# Patient Record
Sex: FEMALE | Race: WHITE | NOT HISPANIC OR LATINO | URBAN - METROPOLITAN AREA
[De-identification: names, ages, dates, MRNs, and addresses within clinical notes are randomized per-mention and may not be internally consistent; named-entity substitution may affect disease eponyms.]

---

## 2017-10-18 ENCOUNTER — EMERGENCY (EMERGENCY)
Facility: HOSPITAL | Age: 67
LOS: 1 days | Discharge: HOME | End: 2017-10-18

## 2017-10-18 DIAGNOSIS — Z79.899 OTHER LONG TERM (CURRENT) DRUG THERAPY: ICD-10-CM

## 2017-10-18 DIAGNOSIS — R04.0 EPISTAXIS: ICD-10-CM

## 2017-10-18 DIAGNOSIS — I10 ESSENTIAL (PRIMARY) HYPERTENSION: ICD-10-CM

## 2017-10-18 DIAGNOSIS — E78.00 PURE HYPERCHOLESTEROLEMIA, UNSPECIFIED: ICD-10-CM

## 2017-10-18 DIAGNOSIS — E03.9 HYPOTHYROIDISM, UNSPECIFIED: ICD-10-CM

## 2018-01-18 ENCOUNTER — OUTPATIENT (OUTPATIENT)
Dept: OUTPATIENT SERVICES | Facility: HOSPITAL | Age: 68
LOS: 1 days | Discharge: HOME | End: 2018-01-18

## 2018-01-18 DIAGNOSIS — R92.8 OTHER ABNORMAL AND INCONCLUSIVE FINDINGS ON DIAGNOSTIC IMAGING OF BREAST: ICD-10-CM

## 2018-01-25 ENCOUNTER — OUTPATIENT (OUTPATIENT)
Dept: OUTPATIENT SERVICES | Facility: HOSPITAL | Age: 68
LOS: 1 days | Discharge: HOME | End: 2018-01-25

## 2018-01-31 ENCOUNTER — TRANSCRIPTION ENCOUNTER (OUTPATIENT)
Age: 68
End: 2018-01-31

## 2018-02-08 ENCOUNTER — APPOINTMENT (OUTPATIENT)
Dept: BREAST CENTER | Facility: CLINIC | Age: 68
End: 2018-02-08
Payer: MEDICARE

## 2018-02-08 ENCOUNTER — LABORATORY RESULT (OUTPATIENT)
Age: 68
End: 2018-02-08

## 2018-02-08 VITALS
HEART RATE: 74 BPM | HEIGHT: 67 IN | OXYGEN SATURATION: 97 % | DIASTOLIC BLOOD PRESSURE: 90 MMHG | WEIGHT: 175 LBS | SYSTOLIC BLOOD PRESSURE: 154 MMHG | BODY MASS INDEX: 27.47 KG/M2

## 2018-02-08 DIAGNOSIS — K21.9 GASTRO-ESOPHAGEAL REFLUX DISEASE W/OUT ESOPHAGITIS: ICD-10-CM

## 2018-02-08 DIAGNOSIS — Z80.0 FAMILY HISTORY OF MALIGNANT NEOPLASM OF DIGESTIVE ORGANS: ICD-10-CM

## 2018-02-08 DIAGNOSIS — I10 ESSENTIAL (PRIMARY) HYPERTENSION: ICD-10-CM

## 2018-02-08 DIAGNOSIS — E03.9 HYPOTHYROIDISM, UNSPECIFIED: ICD-10-CM

## 2018-02-08 DIAGNOSIS — Z80.42 FAMILY HISTORY OF MALIGNANT NEOPLASM OF PROSTATE: ICD-10-CM

## 2018-02-08 DIAGNOSIS — I34.1 NONRHEUMATIC MITRAL (VALVE) PROLAPSE: ICD-10-CM

## 2018-02-08 DIAGNOSIS — D24.2 BENIGN NEOPLASM OF LEFT BREAST: ICD-10-CM

## 2018-02-08 DIAGNOSIS — Z80.3 FAMILY HISTORY OF MALIGNANT NEOPLASM OF BREAST: ICD-10-CM

## 2018-02-08 DIAGNOSIS — Z80.8 FAMILY HISTORY OF MALIGNANT NEOPLASM OF OTHER ORGANS OR SYSTEMS: ICD-10-CM

## 2018-02-08 DIAGNOSIS — Z87.891 PERSONAL HISTORY OF NICOTINE DEPENDENCE: ICD-10-CM

## 2018-02-08 DIAGNOSIS — E78.5 HYPERLIPIDEMIA, UNSPECIFIED: ICD-10-CM

## 2018-02-08 PROCEDURE — 99205 OFFICE O/P NEW HI 60 MIN: CPT

## 2018-02-08 RX ORDER — LEVOTHYROXINE SODIUM 0.07 MG/1
75 TABLET ORAL
Refills: 0 | Status: ACTIVE | COMMUNITY

## 2018-02-08 RX ORDER — METOPROLOL SUCCINATE 50 MG/1
50 TABLET, EXTENDED RELEASE ORAL
Refills: 0 | Status: ACTIVE | COMMUNITY

## 2018-02-08 RX ORDER — OMEPRAZOLE 40 MG/1
40 CAPSULE, DELAYED RELEASE ORAL
Refills: 0 | Status: ACTIVE | COMMUNITY

## 2018-02-08 RX ORDER — PRAVASTATIN SODIUM 10 MG/1
10 TABLET ORAL
Refills: 0 | Status: ACTIVE | COMMUNITY

## 2018-02-09 DIAGNOSIS — N60.22 FIBROADENOSIS OF LEFT BREAST: ICD-10-CM

## 2018-02-09 DIAGNOSIS — C50.912 MALIGNANT NEOPLASM OF UNSPECIFIED SITE OF LEFT FEMALE BREAST: ICD-10-CM

## 2018-02-09 DIAGNOSIS — R92.1 MAMMOGRAPHIC CALCIFICATION FOUND ON DIAGNOSTIC IMAGING OF BREAST: ICD-10-CM

## 2018-02-09 DIAGNOSIS — N63.20 UNSPECIFIED LUMP IN THE LEFT BREAST, UNSPECIFIED QUADRANT: ICD-10-CM

## 2018-02-09 DIAGNOSIS — D24.2 BENIGN NEOPLASM OF LEFT BREAST: ICD-10-CM

## 2018-02-13 ENCOUNTER — OUTPATIENT (OUTPATIENT)
Dept: OUTPATIENT SERVICES | Facility: HOSPITAL | Age: 68
LOS: 1 days | Discharge: HOME | End: 2018-02-13

## 2018-02-13 DIAGNOSIS — C50.412 MALIGNANT NEOPLASM OF UPPER-OUTER QUADRANT OF LEFT FEMALE BREAST: ICD-10-CM

## 2018-02-15 ENCOUNTER — OUTPATIENT (OUTPATIENT)
Dept: OUTPATIENT SERVICES | Facility: HOSPITAL | Age: 68
LOS: 1 days | Discharge: HOME | End: 2018-02-15

## 2018-02-15 DIAGNOSIS — C50.412 MALIGNANT NEOPLASM OF UPPER-OUTER QUADRANT OF LEFT FEMALE BREAST: ICD-10-CM

## 2018-02-15 DIAGNOSIS — R92.8 OTHER ABNORMAL AND INCONCLUSIVE FINDINGS ON DIAGNOSTIC IMAGING OF BREAST: ICD-10-CM

## 2018-02-16 ENCOUNTER — APPOINTMENT (OUTPATIENT)
Dept: HEMATOLOGY ONCOLOGY | Facility: CLINIC | Age: 68
End: 2018-02-16

## 2018-02-16 VITALS
RESPIRATION RATE: 14 BRPM | BODY MASS INDEX: 27.47 KG/M2 | DIASTOLIC BLOOD PRESSURE: 92 MMHG | SYSTOLIC BLOOD PRESSURE: 149 MMHG | HEIGHT: 67 IN | WEIGHT: 175 LBS | TEMPERATURE: 98 F | HEART RATE: 74 BPM

## 2018-02-16 DIAGNOSIS — Z86.79 PERSONAL HISTORY OF OTHER DISEASES OF THE CIRCULATORY SYSTEM: ICD-10-CM

## 2018-02-19 PROBLEM — Z86.79 HISTORY OF HYPERTENSION: Status: RESOLVED | Noted: 2018-02-19 | Resolved: 2018-02-19

## 2018-02-21 PROBLEM — R92.8 ABNORMAL MAGNETIC RESONANCE IMAGING OF RIGHT BREAST: Status: ACTIVE | Noted: 2018-02-21

## 2018-02-26 ENCOUNTER — OUTPATIENT (OUTPATIENT)
Dept: OUTPATIENT SERVICES | Facility: HOSPITAL | Age: 68
LOS: 1 days | Discharge: HOME | End: 2018-02-26

## 2018-02-26 ENCOUNTER — RESULT REVIEW (OUTPATIENT)
Age: 68
End: 2018-02-26

## 2018-02-26 DIAGNOSIS — N62 HYPERTROPHY OF BREAST: ICD-10-CM

## 2018-02-26 DIAGNOSIS — N64.89 OTHER SPECIFIED DISORDERS OF BREAST: ICD-10-CM

## 2018-02-26 DIAGNOSIS — N60.31 FIBROSCLEROSIS OF RIGHT BREAST: ICD-10-CM

## 2018-02-26 DIAGNOSIS — N60.21 FIBROADENOSIS OF RIGHT BREAST: ICD-10-CM

## 2018-02-26 DIAGNOSIS — N63.10 UNSPECIFIED LUMP IN THE RIGHT BREAST, UNSPECIFIED QUADRANT: ICD-10-CM

## 2018-02-27 ENCOUNTER — OUTPATIENT (OUTPATIENT)
Dept: OUTPATIENT SERVICES | Facility: HOSPITAL | Age: 68
LOS: 1 days | Discharge: HOME | End: 2018-02-27

## 2018-02-27 VITALS
DIASTOLIC BLOOD PRESSURE: 84 MMHG | TEMPERATURE: 98 F | RESPIRATION RATE: 16 BRPM | OXYGEN SATURATION: 98 % | HEIGHT: 67 IN | SYSTOLIC BLOOD PRESSURE: 147 MMHG | WEIGHT: 168.65 LBS | HEART RATE: 74 BPM

## 2018-02-27 DIAGNOSIS — Z01.818 ENCOUNTER FOR OTHER PREPROCEDURAL EXAMINATION: ICD-10-CM

## 2018-02-27 DIAGNOSIS — I50.1 LEFT VENTRICULAR FAILURE, UNSPECIFIED: ICD-10-CM

## 2018-02-27 DIAGNOSIS — R92.1 MAMMOGRAPHIC CALCIFICATION FOUND ON DIAGNOSTIC IMAGING OF BREAST: Chronic | ICD-10-CM

## 2018-02-27 DIAGNOSIS — C50.412 MALIGNANT NEOPLASM OF UPPER-OUTER QUADRANT OF LEFT FEMALE BREAST: ICD-10-CM

## 2018-02-27 DIAGNOSIS — Z90.89 ACQUIRED ABSENCE OF OTHER ORGANS: Chronic | ICD-10-CM

## 2018-02-27 DIAGNOSIS — Z87.2 PERSONAL HISTORY OF DISEASES OF THE SKIN AND SUBCUTANEOUS TISSUE: Chronic | ICD-10-CM

## 2018-02-27 LAB
ANION GAP SERPL CALC-SCNC: 9 MMOL/L — SIGNIFICANT CHANGE UP (ref 7–14)
APPEARANCE UR: (no result)
APTT BLD: 26.2 SEC — LOW (ref 27–39.2)
BASOPHILS # BLD AUTO: 0.03 K/UL — SIGNIFICANT CHANGE UP (ref 0–0.2)
BASOPHILS NFR BLD AUTO: 0.4 % — SIGNIFICANT CHANGE UP (ref 0–1)
BILIRUB UR-MCNC: (no result)
BUN SERPL-MCNC: 14 MG/DL — SIGNIFICANT CHANGE UP (ref 10–20)
CALCIUM SERPL-MCNC: 10 MG/DL — SIGNIFICANT CHANGE UP (ref 8.5–10.1)
CHLORIDE SERPL-SCNC: 105 MMOL/L — SIGNIFICANT CHANGE UP (ref 98–110)
CO2 SERPL-SCNC: 27 MMOL/L — SIGNIFICANT CHANGE UP (ref 17–32)
COD CRY URNS QL: (no result) /HPF
COLOR SPEC: SIGNIFICANT CHANGE UP
CREAT SERPL-MCNC: 0.6 MG/DL — LOW (ref 0.7–1.5)
DIFF PNL FLD: NEGATIVE — SIGNIFICANT CHANGE UP
EOSINOPHIL # BLD AUTO: 0.1 K/UL — SIGNIFICANT CHANGE UP (ref 0–0.7)
EOSINOPHIL NFR BLD AUTO: 1.3 % — SIGNIFICANT CHANGE UP (ref 0–8)
GLUCOSE SERPL-MCNC: 105 MG/DL — SIGNIFICANT CHANGE UP (ref 70–110)
GLUCOSE UR QL: NEGATIVE — SIGNIFICANT CHANGE UP
HCT VFR BLD CALC: 43.4 % — SIGNIFICANT CHANGE UP (ref 37–47)
HGB BLD-MCNC: 14.3 G/DL — SIGNIFICANT CHANGE UP (ref 14–18)
IMM GRANULOCYTES NFR BLD AUTO: 0.4 % — HIGH (ref 0.1–0.3)
INR BLD: 1.08 RATIO — SIGNIFICANT CHANGE UP (ref 0.65–1.3)
KETONES UR-MCNC: NEGATIVE — SIGNIFICANT CHANGE UP
LEUKOCYTE ESTERASE UR-ACNC: NEGATIVE — SIGNIFICANT CHANGE UP
LYMPHOCYTES # BLD AUTO: 1.52 K/UL — SIGNIFICANT CHANGE UP (ref 1.2–3.4)
LYMPHOCYTES # BLD AUTO: 20.1 % — LOW (ref 20.5–51.1)
MCHC RBC-ENTMCNC: 29.1 PG — SIGNIFICANT CHANGE UP (ref 27–31)
MCHC RBC-ENTMCNC: 32.9 G/DL — SIGNIFICANT CHANGE UP (ref 32–37)
MCV RBC AUTO: 88.4 FL — SIGNIFICANT CHANGE UP (ref 81–91)
MONOCYTES # BLD AUTO: 0.62 K/UL — HIGH (ref 0.1–0.6)
MONOCYTES NFR BLD AUTO: 8.2 % — SIGNIFICANT CHANGE UP (ref 1.7–9.3)
NEUTROPHILS # BLD AUTO: 5.28 K/UL — SIGNIFICANT CHANGE UP (ref 1.4–6.5)
NEUTROPHILS NFR BLD AUTO: 69.6 % — SIGNIFICANT CHANGE UP (ref 42.2–75.2)
NITRITE UR-MCNC: NEGATIVE — SIGNIFICANT CHANGE UP
NRBC # BLD: 0 /100 WBCS — SIGNIFICANT CHANGE UP (ref 0–0)
PH UR: 6 — SIGNIFICANT CHANGE UP (ref 5–8)
PLATELET # BLD AUTO: 241 K/UL — SIGNIFICANT CHANGE UP (ref 130–400)
POTASSIUM SERPL-MCNC: 3.8 MMOL/L — SIGNIFICANT CHANGE UP (ref 3.5–5)
POTASSIUM SERPL-SCNC: 3.8 MMOL/L — SIGNIFICANT CHANGE UP (ref 3.5–5)
PROT UR-MCNC: 100
PROTHROM AB SERPL-ACNC: 11.7 SEC — SIGNIFICANT CHANGE UP (ref 9.95–12.87)
RBC # BLD: 4.91 M/UL — SIGNIFICANT CHANGE UP (ref 4.2–5.4)
RBC # FLD: 13 % — SIGNIFICANT CHANGE UP (ref 11.5–14.5)
SODIUM SERPL-SCNC: 141 MMOL/L — SIGNIFICANT CHANGE UP (ref 135–146)
SP GR SPEC: >=1.03 — SIGNIFICANT CHANGE UP (ref 1.01–1.03)
SURGICAL PATHOLOGY STUDY: SIGNIFICANT CHANGE UP
URATE CRY FLD QL MICRO: (no result) /HPF
UROBILINOGEN FLD QL: 1 (ref 0.2–0.2)
WBC # BLD: 7.58 K/UL — SIGNIFICANT CHANGE UP (ref 4.8–10.8)
WBC # FLD AUTO: 7.58 K/UL — SIGNIFICANT CHANGE UP (ref 4.8–10.8)
WBC UR QL: SIGNIFICANT CHANGE UP /HPF

## 2018-02-27 NOTE — H&P PST ADULT - OTHER CARE PROVIDERS
cardio: colin bland 2 wks ago, (pt had echo this am-ordered by onc.& having strress echo this afternoon w/ dr shaw) endo: dann bland 1 week ago, onc: lashell bland 2 wks ago

## 2018-02-27 NOTE — H&P PST ADULT - REASON FOR ADMISSION
66 yo female presents with diagnosis of l breast ca via biopsy, now for port placement for upcoming chemo  denies cp,palp,sob,dyspnea,dysuria; ex andre: 2 fos/blocks w/o sob

## 2018-02-27 NOTE — H&P PST ADULT - PMH
Anxiety    Barretts esophagus    Breast cancer  presently left  GERD (gastroesophageal reflux disease)    Hiatal hernia    HTN (hypertension)    Hypothyroid  med dose stable ~ 1 yr  MVP (mitral valve prolapse)    Thyroid nodule

## 2018-02-28 ENCOUNTER — RX RENEWAL (OUTPATIENT)
Age: 68
End: 2018-02-28

## 2018-03-05 ENCOUNTER — APPOINTMENT (OUTPATIENT)
Dept: BREAST CENTER | Facility: AMBULATORY SURGERY CENTER | Age: 68
End: 2018-03-05
Payer: MEDICARE

## 2018-03-05 ENCOUNTER — OUTPATIENT (OUTPATIENT)
Dept: OUTPATIENT SERVICES | Facility: HOSPITAL | Age: 68
LOS: 1 days | Discharge: HOME | End: 2018-03-05

## 2018-03-05 VITALS
DIASTOLIC BLOOD PRESSURE: 93 MMHG | TEMPERATURE: 98 F | OXYGEN SATURATION: 100 % | WEIGHT: 168.65 LBS | SYSTOLIC BLOOD PRESSURE: 170 MMHG | HEIGHT: 67 IN

## 2018-03-05 VITALS
OXYGEN SATURATION: 96 % | SYSTOLIC BLOOD PRESSURE: 147 MMHG | HEART RATE: 74 BPM | RESPIRATION RATE: 21 BRPM | DIASTOLIC BLOOD PRESSURE: 72 MMHG

## 2018-03-05 DIAGNOSIS — Z90.89 ACQUIRED ABSENCE OF OTHER ORGANS: Chronic | ICD-10-CM

## 2018-03-05 DIAGNOSIS — Z87.2 PERSONAL HISTORY OF DISEASES OF THE SKIN AND SUBCUTANEOUS TISSUE: Chronic | ICD-10-CM

## 2018-03-05 DIAGNOSIS — C50.912 MALIGNANT NEOPLASM OF UNSPECIFIED SITE OF LEFT FEMALE BREAST: ICD-10-CM

## 2018-03-05 DIAGNOSIS — R92.1 MAMMOGRAPHIC CALCIFICATION FOUND ON DIAGNOSTIC IMAGING OF BREAST: Chronic | ICD-10-CM

## 2018-03-05 PROCEDURE — 36561 INSERT TUNNELED CV CATH: CPT

## 2018-03-05 RX ORDER — OXYCODONE AND ACETAMINOPHEN 5; 325 MG/1; MG/1
1 TABLET ORAL EVERY 4 HOURS
Qty: 0 | Refills: 0 | Status: DISCONTINUED | OUTPATIENT
Start: 2018-03-05 | End: 2018-03-05

## 2018-03-05 RX ORDER — ONDANSETRON 8 MG/1
4 TABLET, FILM COATED ORAL ONCE
Qty: 0 | Refills: 0 | Status: DISCONTINUED | OUTPATIENT
Start: 2018-03-05 | End: 2018-03-20

## 2018-03-05 RX ORDER — ONDANSETRON 8 MG/1
4 TABLET, FILM COATED ORAL ONCE
Qty: 0 | Refills: 0 | Status: ON HOLD | OUTPATIENT
Start: 2018-03-05

## 2018-03-05 RX ORDER — OXYCODONE AND ACETAMINOPHEN 5; 325 MG/1; MG/1
1 TABLET ORAL EVERY 4 HOURS
Qty: 0 | Refills: 0 | Status: ON HOLD | OUTPATIENT
Start: 2018-03-05

## 2018-03-05 RX ORDER — MORPHINE SULFATE 50 MG/1
2 CAPSULE, EXTENDED RELEASE ORAL
Qty: 0 | Refills: 0 | Status: ON HOLD | OUTPATIENT
Start: 2018-03-05

## 2018-03-05 RX ORDER — KETOCONAZOLE 20 MG/G
1 AEROSOL, FOAM TOPICAL
Qty: 60 | Refills: 0 | OUTPATIENT
Start: 2018-03-05

## 2018-03-05 RX ORDER — SODIUM CHLORIDE 9 MG/ML
1000 INJECTION, SOLUTION INTRAVENOUS
Qty: 0 | Refills: 0 | Status: DISCONTINUED | OUTPATIENT
Start: 2018-03-05 | End: 2018-03-20

## 2018-03-05 RX ORDER — HYDROMORPHONE HYDROCHLORIDE 2 MG/ML
0.5 INJECTION INTRAMUSCULAR; INTRAVENOUS; SUBCUTANEOUS
Qty: 0 | Refills: 0 | Status: DISCONTINUED | OUTPATIENT
Start: 2018-03-05 | End: 2018-03-05

## 2018-03-05 RX ORDER — ACETAMINOPHEN 500 MG
650 TABLET ORAL ONCE
Qty: 0 | Refills: 0 | Status: DISCONTINUED | OUTPATIENT
Start: 2018-03-05 | End: 2018-03-20

## 2018-03-05 RX ORDER — SODIUM CHLORIDE 9 MG/ML
1000 INJECTION, SOLUTION INTRAVENOUS
Qty: 0 | Refills: 0 | Status: ON HOLD | OUTPATIENT
Start: 2018-03-05

## 2018-03-05 RX ADMIN — SODIUM CHLORIDE 100 MILLILITER(S): 9 INJECTION, SOLUTION INTRAVENOUS at 10:57

## 2018-03-05 NOTE — PROGRESS NOTE ADULT - SUBJECTIVE AND OBJECTIVE BOX
Pt s/p left mediport placement. Post op CXR showed no evidence of pneumothorax which was confirmed by Dr. Ramirez. Pt may be d/c from PACU when ready.

## 2018-03-05 NOTE — CHART NOTE - NSCHARTNOTEFT_GEN_A_CORE
PACU ANESTHESIA ADMISSION NOTE      Procedure: Insertion of port with imaging guidance    Post op diagnosis:  Carcinoma of upper-outer quadrant of left female breast, unspecified estrogen receptor status      ____  Intubated  TV:______       Rate: ______      FiO2: ______    _x___  Patent Airway    _x___  Full return of protective reflexes    _x___  Full recovery from anesthesia / back to baseline status    Vitals:  T(C): 36.7   HR: 80  BP: 155/67  RR: --18  SpO2: 98    Mental Status:  _x___ Awake   _____ Alert   _____ Drowsy   _____ Sedated    Nausea/Vomiting:  _x___  NO       ______Yes,   See Post - Op Orders         Pain Scale (0-10):  __0___    Treatment: _x___ None    ____ See Post - Op/PCA Orders    Post - Operative Fluids:   __x__ Oral   ____ See Post - Op Orders    Plan: Discharge:   _x___Home       _____Floor     _____Critical Care    _____  Other:_________________    Comments:  No anesthesia issues or complications noted.  Discharge when criteria met.

## 2018-03-05 NOTE — BRIEF OPERATIVE NOTE - PROCEDURE
<<-----Click on this checkbox to enter Procedure Insertion of port with imaging guidance in patient 5 years of age or older  03/05/2018    Active  [id number]

## 2018-03-05 NOTE — ASU DISCHARGE PLAN (ADULT/PEDIATRIC). - SPECIAL INSTRUCTIONS
Regular Diet.    No heavy lifting more than 15 pounds for two weeks.    Please remove plastic dressings in three days.  Leave white tape in place.  May shower tomorrow.

## 2018-03-05 NOTE — BRIEF OPERATIVE NOTE - PRE-OP DX
Carcinoma of upper-outer quadrant of left female breast, unspecified estrogen receptor status  03/05/2018    Active  Radha Dong

## 2018-03-13 ENCOUNTER — APPOINTMENT (OUTPATIENT)
Dept: BREAST CENTER | Facility: CLINIC | Age: 68
End: 2018-03-13
Payer: MEDICARE

## 2018-03-13 VITALS
WEIGHT: 175 LBS | BODY MASS INDEX: 27.47 KG/M2 | HEIGHT: 67 IN | DIASTOLIC BLOOD PRESSURE: 84 MMHG | HEART RATE: 83 BPM | SYSTOLIC BLOOD PRESSURE: 140 MMHG | OXYGEN SATURATION: 96 %

## 2018-03-13 PROCEDURE — 99024 POSTOP FOLLOW-UP VISIT: CPT

## 2018-03-13 RX ORDER — AMLODIPINE BESYLATE 5 MG/1
5 TABLET ORAL
Qty: 90 | Refills: 0 | Status: ACTIVE | COMMUNITY
Start: 2018-02-09

## 2018-03-14 ENCOUNTER — LABORATORY RESULT (OUTPATIENT)
Age: 68
End: 2018-03-14

## 2018-03-14 ENCOUNTER — APPOINTMENT (OUTPATIENT)
Dept: INFUSION THERAPY | Facility: CLINIC | Age: 68
End: 2018-03-14

## 2018-03-14 LAB
HCT VFR BLD CALC: 42.5 %
HGB BLD-MCNC: 14.8 G/DL
MCHC RBC-ENTMCNC: 29.7 PG
MCHC RBC-ENTMCNC: 34.8 G/DL
MCV RBC AUTO: 85.3 FL
PLATELET # BLD AUTO: 239 K/UL
PMV BLD: 10.4 FL
RBC # BLD: 4.98 M/UL
RBC # FLD: 12.8 %
WBC # FLD AUTO: 8.9 K/UL

## 2018-03-14 RX ORDER — TRASTUZUMAB-DKST 420 MG/20ML
635 INJECTION, POWDER, LYOPHILIZED, FOR SOLUTION INTRAVENOUS ONCE
Qty: 0 | Refills: 0 | Status: COMPLETED | OUTPATIENT
Start: 2018-03-14 | End: 2018-03-14

## 2018-03-14 RX ORDER — ACETAMINOPHEN 500 MG
650 TABLET ORAL ONCE
Qty: 0 | Refills: 0 | Status: COMPLETED | OUTPATIENT
Start: 2018-03-14 | End: 2018-03-14

## 2018-03-14 RX ORDER — DOCETAXEL 20 MG/ML
140 INJECTION, SOLUTION, CONCENTRATE INTRAVENOUS ONCE
Qty: 0 | Refills: 0 | Status: COMPLETED | OUTPATIENT
Start: 2018-03-14 | End: 2018-03-14

## 2018-03-14 RX ORDER — FAMOTIDINE 10 MG/ML
20 INJECTION INTRAVENOUS ONCE
Qty: 0 | Refills: 0 | Status: COMPLETED | OUTPATIENT
Start: 2018-03-14 | End: 2018-03-14

## 2018-03-14 RX ORDER — CARBOPLATIN 50 MG
680 VIAL (EA) INTRAVENOUS ONCE
Qty: 0 | Refills: 0 | Status: COMPLETED | OUTPATIENT
Start: 2018-03-14 | End: 2018-03-14

## 2018-03-14 RX ORDER — DIPHENHYDRAMINE HCL 50 MG
25 CAPSULE ORAL ONCE
Qty: 0 | Refills: 0 | Status: COMPLETED | OUTPATIENT
Start: 2018-03-14 | End: 2018-03-14

## 2018-03-14 RX ORDER — ALTEPLASE 100 MG
2 KIT INTRAVENOUS ONCE
Qty: 0 | Refills: 0 | Status: COMPLETED | OUTPATIENT
Start: 2018-03-14 | End: 2018-03-14

## 2018-03-14 RX ORDER — PERTUZUMAB 30 MG/ML
840 INJECTION, SOLUTION, CONCENTRATE INTRAVENOUS ONCE
Qty: 0 | Refills: 0 | Status: COMPLETED | OUTPATIENT
Start: 2018-03-14 | End: 2018-03-14

## 2018-03-14 RX ORDER — DEXAMETHASONE 0.5 MG/5ML
20 ELIXIR ORAL ONCE
Qty: 0 | Refills: 0 | Status: COMPLETED | OUTPATIENT
Start: 2018-03-14 | End: 2018-03-14

## 2018-03-14 RX ADMIN — Medication 650 MILLIGRAM(S): at 11:16

## 2018-03-14 RX ADMIN — Medication 151.5 MILLIGRAM(S): at 11:17

## 2018-03-14 RX ADMIN — FAMOTIDINE 156 MILLIGRAM(S): 10 INJECTION INTRAVENOUS at 11:17

## 2018-03-14 RX ADMIN — TRASTUZUMAB-DKST 186.82 MILLIGRAM(S): 420 INJECTION, POWDER, LYOPHILIZED, FOR SOLUTION INTRAVENOUS at 11:26

## 2018-03-14 RX ADMIN — Medication 650 MILLIGRAM(S): at 16:07

## 2018-03-14 RX ADMIN — Medication 189 MILLIGRAM(S): at 11:16

## 2018-03-14 RX ADMIN — PERTUZUMAB 278 MILLIGRAM(S): 30 INJECTION, SOLUTION, CONCENTRATE INTRAVENOUS at 11:25

## 2018-03-14 RX ADMIN — Medication 318 MILLIGRAM(S): at 15:28

## 2018-03-14 RX ADMIN — Medication 650 MILLIGRAM(S): at 11:24

## 2018-03-14 RX ADMIN — ALTEPLASE 2 MILLIGRAM(S): KIT at 11:25

## 2018-03-14 RX ADMIN — DOCETAXEL 128.5 MILLIGRAM(S): 20 INJECTION, SOLUTION, CONCENTRATE INTRAVENOUS at 15:26

## 2018-03-14 RX ADMIN — Medication 650 MILLIGRAM(S): at 16:06

## 2018-03-15 ENCOUNTER — APPOINTMENT (OUTPATIENT)
Dept: INFUSION THERAPY | Facility: CLINIC | Age: 68
End: 2018-03-15

## 2018-03-15 RX ORDER — PEGFILGRASTIM-CBQV 6 MG/.6ML
6 INJECTION, SOLUTION SUBCUTANEOUS ONCE
Qty: 0 | Refills: 0 | Status: COMPLETED | OUTPATIENT
Start: 2018-03-15 | End: 2018-03-15

## 2018-03-15 RX ADMIN — PEGFILGRASTIM-CBQV 6 MILLIGRAM(S): 6 INJECTION, SOLUTION SUBCUTANEOUS at 16:44

## 2018-03-21 LAB
ALBUMIN SERPL ELPH-MCNC: 4 G/DL
ALP BLD-CCNC: 56 U/L
ALT SERPL-CCNC: 38 U/L
ANION GAP SERPL CALC-SCNC: 8 MMOL/L
AST SERPL-CCNC: 32 U/L
BILIRUB SERPL-MCNC: 0.7 MG/DL
BUN SERPL-MCNC: 13 MG/DL
CALCIUM SERPL-MCNC: 10.1 MG/DL
CANCER AG15-3 SERPL-ACNC: 19.4 U/ML
CEA SERPL-MCNC: 1.2 NG/ML
CHLORIDE SERPL-SCNC: 102 MMOL/L
CO2 SERPL-SCNC: 25 MMOL/L
CREAT SERPL-MCNC: 0.6 MG/DL
GLUCOSE SERPL-MCNC: 118 MG/DL
POTASSIUM SERPL-SCNC: 3.7 MMOL/L
PROT SERPL-MCNC: 6.8 G/DL
SODIUM SERPL-SCNC: 135 MMOL/L

## 2018-03-23 ENCOUNTER — OUTPATIENT (OUTPATIENT)
Dept: OUTPATIENT SERVICES | Facility: HOSPITAL | Age: 68
LOS: 1 days | Discharge: HOME | End: 2018-03-23
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
## 2018-03-23 DIAGNOSIS — T82.898A OTHER SPECIFIED COMPLICATION OF VASCULAR PROSTHETIC DEVICES, IMPLANTS AND GRAFTS, INITIAL ENCOUNTER: ICD-10-CM

## 2018-03-23 DIAGNOSIS — Z87.2 PERSONAL HISTORY OF DISEASES OF THE SKIN AND SUBCUTANEOUS TISSUE: Chronic | ICD-10-CM

## 2018-03-23 DIAGNOSIS — R92.1 MAMMOGRAPHIC CALCIFICATION FOUND ON DIAGNOSTIC IMAGING OF BREAST: Chronic | ICD-10-CM

## 2018-03-23 DIAGNOSIS — Z85.3 PERSONAL HISTORY OF MALIGNANT NEOPLASM OF BREAST: ICD-10-CM

## 2018-03-23 DIAGNOSIS — Z90.89 ACQUIRED ABSENCE OF OTHER ORGANS: Chronic | ICD-10-CM

## 2018-03-27 ENCOUNTER — LABORATORY RESULT (OUTPATIENT)
Age: 68
End: 2018-03-27

## 2018-03-27 ENCOUNTER — APPOINTMENT (OUTPATIENT)
Dept: HEMATOLOGY ONCOLOGY | Facility: CLINIC | Age: 68
End: 2018-03-27

## 2018-03-28 LAB
ALBUMIN SERPL ELPH-MCNC: 4.2 G/DL
ALP BLD-CCNC: 102 U/L
ALT SERPL-CCNC: 46 U/L
ANION GAP SERPL CALC-SCNC: 21 MMOL/L
AST SERPL-CCNC: 36 U/L
BILIRUB SERPL-MCNC: 0.3 MG/DL
BUN SERPL-MCNC: 18 MG/DL
CALCIUM SERPL-MCNC: 9.5 MG/DL
CHLORIDE SERPL-SCNC: 95 MMOL/L
CO2 SERPL-SCNC: 22 MMOL/L
CREAT SERPL-MCNC: 0.8 MG/DL
GLUCOSE SERPL-MCNC: 60 MG/DL
HCT VFR BLD CALC: 41.3 %
HGB BLD-MCNC: 13.8 G/DL
INR PPP: 1.22 RATIO
MCHC RBC-ENTMCNC: 29.5 PG
MCHC RBC-ENTMCNC: 33.4 G/DL
MCV RBC AUTO: 88.2 FL
PLATELET # BLD AUTO: 145 K/UL
PMV BLD: 9.9 FL
POTASSIUM SERPL-SCNC: 3.6 MMOL/L
PROT SERPL-MCNC: 6.9 G/DL
PT BLD: 13.1 SEC
RBC # BLD: 4.68 M/UL
RBC # FLD: 13.1 %
SODIUM SERPL-SCNC: 138 MMOL/L
WBC # FLD AUTO: 16.51 K/UL

## 2018-03-29 ENCOUNTER — OUTPATIENT (OUTPATIENT)
Dept: OUTPATIENT SERVICES | Facility: HOSPITAL | Age: 68
LOS: 1 days | Discharge: HOME | End: 2018-03-29

## 2018-03-29 DIAGNOSIS — E03.9 HYPOTHYROIDISM, UNSPECIFIED: ICD-10-CM

## 2018-03-29 DIAGNOSIS — C50.919 MALIGNANT NEOPLASM OF UNSPECIFIED SITE OF UNSPECIFIED FEMALE BREAST: ICD-10-CM

## 2018-03-29 DIAGNOSIS — R92.1 MAMMOGRAPHIC CALCIFICATION FOUND ON DIAGNOSTIC IMAGING OF BREAST: Chronic | ICD-10-CM

## 2018-03-29 DIAGNOSIS — E78.5 HYPERLIPIDEMIA, UNSPECIFIED: ICD-10-CM

## 2018-03-29 DIAGNOSIS — I10 ESSENTIAL (PRIMARY) HYPERTENSION: ICD-10-CM

## 2018-03-29 DIAGNOSIS — Z90.89 ACQUIRED ABSENCE OF OTHER ORGANS: Chronic | ICD-10-CM

## 2018-03-29 DIAGNOSIS — Z87.2 PERSONAL HISTORY OF DISEASES OF THE SKIN AND SUBCUTANEOUS TISSUE: Chronic | ICD-10-CM

## 2018-03-29 RX ORDER — CEPHALEXIN 500 MG
1 CAPSULE ORAL
Qty: 10 | Refills: 0 | OUTPATIENT
Start: 2018-03-29 | End: 2018-04-02

## 2018-03-29 NOTE — PROGRESS NOTE ADULT - SUBJECTIVE AND OBJECTIVE BOX
Interventional Radiology Outpatient Documentation    PREOPERATIVE DAY OF PROCEDURE EVALUATION:     I have personally seen and examined this patient. I agree with the history and physical which I have reviewed and noted any changes below:     Plan is for Port-a-Cath removal with replacement with new Port-A-Cath     Procedure/ risks/ benefits/ goals/ alternatives were explained. All questions answered. Informed content obtained from patient. Consent placed in chart.     POSTOPERATIVE PROCEDURAL EVALUATION:     Procedure:    Pre-Op Diagnosis:    Post-Op Diagnosis:    Attending:   Assistant:     Anesthesia (type):  [ ] General Anesthesia  [ ] Sedation  [ ] Spinal Anesthesia  [ ] Local/Regional    Contrast:     Estimated Blood Loss:    Condition:   [ ] Critical  [ ] Serious  [ ] Fair   [ ] Good    Findings/Follow up Plan of Care:    Specimens Removed:    Implants:    Complications:    Disposition: Interventional Radiology Outpatient Documentation    PREOPERATIVE DAY OF PROCEDURE EVALUATION:     I have personally seen and examined this patient. I agree with the history and physical which I have reviewed and noted any changes below:     Plan is for Port-a-Cath removal with replacement with new Port-A-Cath     Procedure/ risks/ benefits/ goals/ alternatives were explained. All questions answered. Informed content obtained from patient. Consent placed in chart.     POSTOPERATIVE PROCEDURAL EVALUATION:     Procedure: Port removal and replacement with new port via right IJ    Pre-Op Diagnosis: malfunctioning port    Post-Op Diagnosis: as above    Attending: andi  Assistant: damion    Anesthesia (type):  [ ] General Anesthesia  [ x] Sedation  [ ] Spinal Anesthesia  [ ] Local/Regional    Contrast: none    Estimated Blood Loss: < 5 cc    Condition:   [ ] Critical  [ ] Serious  [ ] Fair   [x ] Good    Findings/Follow up Plan of Care:  1) port ok to use  2) will f/u in 5 days  3) home abx for 5 days- keflex     Specimens Removed: none    Implants: none    Complications: none    Disposition: home

## 2018-04-02 ENCOUNTER — APPOINTMENT (OUTPATIENT)
Dept: HEMATOLOGY ONCOLOGY | Facility: CLINIC | Age: 68
End: 2018-04-02

## 2018-04-02 ENCOUNTER — LABORATORY RESULT (OUTPATIENT)
Age: 68
End: 2018-04-02

## 2018-04-02 VITALS
RESPIRATION RATE: 16 BRPM | BODY MASS INDEX: 26.06 KG/M2 | TEMPERATURE: 97.5 F | WEIGHT: 166 LBS | SYSTOLIC BLOOD PRESSURE: 151 MMHG | DIASTOLIC BLOOD PRESSURE: 85 MMHG | HEART RATE: 72 BPM | HEIGHT: 67 IN

## 2018-04-02 LAB
HCT VFR BLD CALC: 40.6 %
HGB BLD-MCNC: 13.7 G/DL
MCHC RBC-ENTMCNC: 29.4 PG
MCHC RBC-ENTMCNC: 33.7 G/DL
MCV RBC AUTO: 87.1 FL
PLATELET # BLD AUTO: 183 K/UL
PMV BLD: 10.3 FL
RBC # BLD: 4.66 M/UL
RBC # FLD: 13.5 %
WBC # FLD AUTO: 10.92 K/UL

## 2018-04-04 ENCOUNTER — APPOINTMENT (OUTPATIENT)
Dept: INFUSION THERAPY | Facility: CLINIC | Age: 68
End: 2018-04-04

## 2018-04-04 RX ORDER — DEXAMETHASONE 0.5 MG/5ML
20 ELIXIR ORAL ONCE
Qty: 0 | Refills: 0 | Status: COMPLETED | OUTPATIENT
Start: 2018-04-04 | End: 2018-04-04

## 2018-04-04 RX ORDER — TRASTUZUMAB-DKST 420 MG/20ML
450 INJECTION, POWDER, LYOPHILIZED, FOR SOLUTION INTRAVENOUS ONCE
Qty: 0 | Refills: 0 | Status: COMPLETED | OUTPATIENT
Start: 2018-04-04 | End: 2018-04-04

## 2018-04-04 RX ORDER — ACETAMINOPHEN 500 MG
650 TABLET ORAL ONCE
Qty: 0 | Refills: 0 | Status: COMPLETED | OUTPATIENT
Start: 2018-04-04 | End: 2018-04-04

## 2018-04-04 RX ORDER — PEGFILGRASTIM-CBQV 6 MG/.6ML
6 INJECTION, SOLUTION SUBCUTANEOUS ONCE
Qty: 0 | Refills: 0 | Status: COMPLETED | OUTPATIENT
Start: 2018-04-04 | End: 2018-04-04

## 2018-04-04 RX ORDER — FAMOTIDINE 10 MG/ML
20 INJECTION INTRAVENOUS ONCE
Qty: 0 | Refills: 0 | Status: COMPLETED | OUTPATIENT
Start: 2018-04-04 | End: 2018-04-04

## 2018-04-04 RX ORDER — DIPHENHYDRAMINE HCL 50 MG
25 CAPSULE ORAL ONCE
Qty: 0 | Refills: 0 | Status: COMPLETED | OUTPATIENT
Start: 2018-04-04 | End: 2018-04-04

## 2018-04-04 RX ORDER — DOCETAXEL 20 MG/ML
140 INJECTION, SOLUTION, CONCENTRATE INTRAVENOUS ONCE
Qty: 0 | Refills: 0 | Status: COMPLETED | OUTPATIENT
Start: 2018-04-04 | End: 2018-04-04

## 2018-04-04 RX ORDER — CARBOPLATIN 50 MG
525 VIAL (EA) INTRAVENOUS ONCE
Qty: 0 | Refills: 0 | Status: COMPLETED | OUTPATIENT
Start: 2018-04-04 | End: 2018-04-04

## 2018-04-04 RX ORDER — PERTUZUMAB 30 MG/ML
420 INJECTION, SOLUTION, CONCENTRATE INTRAVENOUS ONCE
Qty: 0 | Refills: 0 | Status: COMPLETED | OUTPATIENT
Start: 2018-04-04 | End: 2018-04-04

## 2018-04-04 RX ORDER — MAGNESIUM SULFATE 500 MG/ML
2 VIAL (ML) INJECTION ONCE
Qty: 0 | Refills: 0 | Status: COMPLETED | OUTPATIENT
Start: 2018-04-04 | End: 2018-04-04

## 2018-04-04 RX ADMIN — Medication 650 MILLIGRAM(S): at 09:52

## 2018-04-04 RX ADMIN — Medication 151.5 MILLIGRAM(S): at 09:51

## 2018-04-04 RX ADMIN — Medication 50 GRAM(S): at 13:34

## 2018-04-04 RX ADMIN — Medication 650 MILLIGRAM(S): at 10:37

## 2018-04-04 RX ADMIN — PEGFILGRASTIM-CBQV 6 MILLIGRAM(S): 6 INJECTION, SOLUTION SUBCUTANEOUS at 15:36

## 2018-04-04 RX ADMIN — DOCETAXEL 257 MILLIGRAM(S): 20 INJECTION, SOLUTION, CONCENTRATE INTRAVENOUS at 10:35

## 2018-04-04 RX ADMIN — Medication 189 MILLIGRAM(S): at 10:36

## 2018-04-04 RX ADMIN — PERTUZUMAB 528 MILLIGRAM(S): 30 INJECTION, SOLUTION, CONCENTRATE INTRAVENOUS at 10:36

## 2018-04-04 RX ADMIN — Medication 302.5 MILLIGRAM(S): at 10:35

## 2018-04-04 RX ADMIN — TRASTUZUMAB-DKST 542.84 MILLIGRAM(S): 420 INJECTION, POWDER, LYOPHILIZED, FOR SOLUTION INTRAVENOUS at 10:36

## 2018-04-04 RX ADMIN — FAMOTIDINE 156 MILLIGRAM(S): 10 INJECTION INTRAVENOUS at 10:37

## 2018-04-05 ENCOUNTER — APPOINTMENT (OUTPATIENT)
Dept: INFUSION THERAPY | Facility: CLINIC | Age: 68
End: 2018-04-05

## 2018-04-09 LAB
ALBUMIN SERPL ELPH-MCNC: 3.9 G/DL
ALP BLD-CCNC: 64 U/L
ALT SERPL-CCNC: 42 U/L
ANION GAP SERPL CALC-SCNC: 13 MMOL/L
AST SERPL-CCNC: 30 U/L
BILIRUB DIRECT SERPL-MCNC: <0.2 MG/DL
BILIRUB INDIRECT SERPL-MCNC: >0.1 MG/DL
BILIRUB SERPL-MCNC: 0.3 MG/DL
BUN SERPL-MCNC: 13 MG/DL
CALCIUM SERPL-MCNC: 9.2 MG/DL
CHLORIDE SERPL-SCNC: 99 MMOL/L
CO2 SERPL-SCNC: 23 MMOL/L
CREAT SERPL-MCNC: 0.7 MG/DL
GLUCOSE SERPL-MCNC: 112 MG/DL
MAGNESIUM SERPL-MCNC: 1.4 MG/DL
POTASSIUM SERPL-SCNC: 3.6 MMOL/L
PROT SERPL-MCNC: 6.1 G/DL
SODIUM SERPL-SCNC: 135 MMOL/L

## 2018-04-18 NOTE — ASU PATIENT PROFILE, ADULT - AS SC BRADEN MOISTURE
Follow up with eye doctor for evaluation. - shingles is Varicella Zoster and that the first time a person is exposed to the virus they get chicken pox.   The virus then stays dormant in your body until a time when your immune system is lowered either by
(4) rarely moist

## 2018-04-23 ENCOUNTER — APPOINTMENT (OUTPATIENT)
Dept: HEMATOLOGY ONCOLOGY | Facility: CLINIC | Age: 68
End: 2018-04-23

## 2018-04-23 ENCOUNTER — LABORATORY RESULT (OUTPATIENT)
Age: 68
End: 2018-04-23

## 2018-04-23 VITALS
RESPIRATION RATE: 16 BRPM | HEART RATE: 87 BPM | HEIGHT: 67 IN | DIASTOLIC BLOOD PRESSURE: 70 MMHG | SYSTOLIC BLOOD PRESSURE: 138 MMHG | TEMPERATURE: 96.8 F | BODY MASS INDEX: 25.43 KG/M2 | WEIGHT: 162 LBS

## 2018-04-23 LAB
HCT VFR BLD CALC: 39.4 %
HGB BLD-MCNC: 13 G/DL
MCHC RBC-ENTMCNC: 29.7 PG
MCHC RBC-ENTMCNC: 33 G/DL
MCV RBC AUTO: 90 FL
PLATELET # BLD AUTO: 191 K/UL
PMV BLD: 9.9 FL
RBC # BLD: 4.38 M/UL
RBC # FLD: 14.9 %
WBC # FLD AUTO: 8.44 K/UL

## 2018-04-25 ENCOUNTER — APPOINTMENT (OUTPATIENT)
Dept: INFUSION THERAPY | Facility: CLINIC | Age: 68
End: 2018-04-25

## 2018-04-25 RX ORDER — PEGFILGRASTIM-CBQV 6 MG/.6ML
6 INJECTION, SOLUTION SUBCUTANEOUS ONCE
Qty: 0 | Refills: 0 | Status: COMPLETED | OUTPATIENT
Start: 2018-04-25 | End: 2018-04-25

## 2018-04-25 RX ORDER — TRASTUZUMAB-DKST 420 MG/20ML
440 INJECTION, POWDER, LYOPHILIZED, FOR SOLUTION INTRAVENOUS ONCE
Qty: 0 | Refills: 0 | Status: COMPLETED | OUTPATIENT
Start: 2018-04-25 | End: 2018-04-25

## 2018-04-25 RX ORDER — MAGNESIUM SULFATE 500 MG/ML
2 VIAL (ML) INJECTION ONCE
Qty: 0 | Refills: 0 | Status: COMPLETED | OUTPATIENT
Start: 2018-04-25 | End: 2018-04-25

## 2018-04-25 RX ORDER — ACETAMINOPHEN 500 MG
650 TABLET ORAL ONCE
Qty: 0 | Refills: 0 | Status: COMPLETED | OUTPATIENT
Start: 2018-04-25 | End: 2018-04-25

## 2018-04-25 RX ORDER — DIPHENHYDRAMINE HCL 50 MG
25 CAPSULE ORAL ONCE
Qty: 0 | Refills: 0 | Status: COMPLETED | OUTPATIENT
Start: 2018-04-25 | End: 2018-04-25

## 2018-04-25 RX ORDER — CARBOPLATIN 50 MG
470 VIAL (EA) INTRAVENOUS ONCE
Qty: 0 | Refills: 0 | Status: COMPLETED | OUTPATIENT
Start: 2018-04-25 | End: 2018-04-25

## 2018-04-25 RX ORDER — DOCETAXEL 20 MG/ML
135 INJECTION, SOLUTION, CONCENTRATE INTRAVENOUS ONCE
Qty: 0 | Refills: 0 | Status: COMPLETED | OUTPATIENT
Start: 2018-04-25 | End: 2018-04-25

## 2018-04-25 RX ORDER — DEXAMETHASONE 0.5 MG/5ML
20 ELIXIR ORAL ONCE
Qty: 0 | Refills: 0 | Status: COMPLETED | OUTPATIENT
Start: 2018-04-25 | End: 2018-04-25

## 2018-04-25 RX ORDER — PERTUZUMAB 30 MG/ML
420 INJECTION, SOLUTION, CONCENTRATE INTRAVENOUS ONCE
Qty: 0 | Refills: 0 | Status: COMPLETED | OUTPATIENT
Start: 2018-04-25 | End: 2018-04-25

## 2018-04-25 RX ORDER — FAMOTIDINE 10 MG/ML
20 INJECTION INTRAVENOUS ONCE
Qty: 0 | Refills: 0 | Status: COMPLETED | OUTPATIENT
Start: 2018-04-25 | End: 2018-04-25

## 2018-04-25 RX ADMIN — TRASTUZUMAB-DKST 180.63 MILLIGRAM(S): 420 INJECTION, POWDER, LYOPHILIZED, FOR SOLUTION INTRAVENOUS at 12:06

## 2018-04-25 RX ADMIN — Medication 189 MILLIGRAM(S): at 10:56

## 2018-04-25 RX ADMIN — Medication 650 MILLIGRAM(S): at 10:56

## 2018-04-25 RX ADMIN — PEGFILGRASTIM-CBQV 6 MILLIGRAM(S): 6 INJECTION, SOLUTION SUBCUTANEOUS at 17:11

## 2018-04-25 RX ADMIN — Medication 297 MILLIGRAM(S): at 12:07

## 2018-04-25 RX ADMIN — Medication 650 MILLIGRAM(S): at 10:59

## 2018-04-25 RX ADMIN — FAMOTIDINE 156 MILLIGRAM(S): 10 INJECTION INTRAVENOUS at 10:58

## 2018-04-25 RX ADMIN — DOCETAXEL 256.75 MILLIGRAM(S): 20 INJECTION, SOLUTION, CONCENTRATE INTRAVENOUS at 12:07

## 2018-04-25 RX ADMIN — PERTUZUMAB 264 MILLIGRAM(S): 30 INJECTION, SOLUTION, CONCENTRATE INTRAVENOUS at 12:09

## 2018-04-25 RX ADMIN — Medication 50 GRAM(S): at 14:23

## 2018-04-25 RX ADMIN — Medication 151.5 MILLIGRAM(S): at 10:58

## 2018-05-05 LAB
ALBUMIN SERPL ELPH-MCNC: 4.4 G/DL
ALP BLD-CCNC: 80 U/L
ALT SERPL-CCNC: 36 U/L
ANION GAP SERPL CALC-SCNC: 14 MMOL/L
AST SERPL-CCNC: 28 U/L
BILIRUB DIRECT SERPL-MCNC: <0.2 MG/DL
BILIRUB INDIRECT SERPL-MCNC: >0.2 MG/DL
BILIRUB SERPL-MCNC: 0.4 MG/DL
BUN SERPL-MCNC: 15 MG/DL
CALCIUM SERPL-MCNC: 10 MG/DL
CHLORIDE SERPL-SCNC: 102 MMOL/L
CO2 SERPL-SCNC: 22 MMOL/L
CREAT SERPL-MCNC: 0.9 MG/DL
GLUCOSE SERPL-MCNC: 75 MG/DL
MAGNESIUM SERPL-MCNC: 1.6 MG/DL
POTASSIUM SERPL-SCNC: 4.3 MMOL/L
PROT SERPL-MCNC: 7 G/DL
SODIUM SERPL-SCNC: 138 MMOL/L

## 2018-05-14 ENCOUNTER — LABORATORY RESULT (OUTPATIENT)
Age: 68
End: 2018-05-14

## 2018-05-14 ENCOUNTER — APPOINTMENT (OUTPATIENT)
Dept: HEMATOLOGY ONCOLOGY | Facility: CLINIC | Age: 68
End: 2018-05-14

## 2018-05-14 VITALS
WEIGHT: 162 LBS | BODY MASS INDEX: 25.43 KG/M2 | HEIGHT: 67 IN | HEART RATE: 80 BPM | SYSTOLIC BLOOD PRESSURE: 115 MMHG | TEMPERATURE: 96.6 F | DIASTOLIC BLOOD PRESSURE: 74 MMHG | RESPIRATION RATE: 16 BRPM

## 2018-05-14 DIAGNOSIS — F41.9 ANXIETY DISORDER, UNSPECIFIED: ICD-10-CM

## 2018-05-14 DIAGNOSIS — G47.00 INSOMNIA, UNSPECIFIED: ICD-10-CM

## 2018-05-14 LAB
HCT VFR BLD CALC: 33.9 %
HGB BLD-MCNC: 11.7 G/DL
MCHC RBC-ENTMCNC: 31.3 PG
MCHC RBC-ENTMCNC: 34.5 G/DL
MCV RBC AUTO: 90.6 FL
PLATELET # BLD AUTO: 170 K/UL
PMV BLD: 10.1 FL
RBC # BLD: 3.74 M/UL
RBC # FLD: 15.7 %
WBC # FLD AUTO: 9.99 K/UL

## 2018-05-15 LAB
ALBUMIN SERPL ELPH-MCNC: 4.1 G/DL
ALP BLD-CCNC: 64 U/L
ALT SERPL-CCNC: 27 U/L
ANION GAP SERPL CALC-SCNC: 13 MMOL/L
AST SERPL-CCNC: 23 U/L
BILIRUB DIRECT SERPL-MCNC: <0.2 MG/DL
BILIRUB INDIRECT SERPL-MCNC: >0.1 MG/DL
BILIRUB SERPL-MCNC: 0.3 MG/DL
BUN SERPL-MCNC: 19 MG/DL
CALCIUM SERPL-MCNC: 9.8 MG/DL
CHLORIDE SERPL-SCNC: 99 MMOL/L
CO2 SERPL-SCNC: 23 MMOL/L
CREAT SERPL-MCNC: 0.9 MG/DL
GLUCOSE SERPL-MCNC: 61 MG/DL
MAGNESIUM SERPL-MCNC: 1.6 MG/DL
POTASSIUM SERPL-SCNC: 4.3 MMOL/L
PROT SERPL-MCNC: 6.5 G/DL
SODIUM SERPL-SCNC: 135 MMOL/L

## 2018-05-16 ENCOUNTER — APPOINTMENT (OUTPATIENT)
Dept: INFUSION THERAPY | Facility: CLINIC | Age: 68
End: 2018-05-16

## 2018-05-16 ENCOUNTER — OUTPATIENT (OUTPATIENT)
Dept: OUTPATIENT SERVICES | Facility: HOSPITAL | Age: 68
LOS: 1 days | Discharge: HOME | End: 2018-05-16

## 2018-05-16 DIAGNOSIS — Z90.89 ACQUIRED ABSENCE OF OTHER ORGANS: Chronic | ICD-10-CM

## 2018-05-16 DIAGNOSIS — Z87.2 PERSONAL HISTORY OF DISEASES OF THE SKIN AND SUBCUTANEOUS TISSUE: Chronic | ICD-10-CM

## 2018-05-16 DIAGNOSIS — Z51.11 ENCOUNTER FOR ANTINEOPLASTIC CHEMOTHERAPY: ICD-10-CM

## 2018-05-16 DIAGNOSIS — C50.812 MALIGNANT NEOPLASM OF OVERLAPPING SITES OF LEFT FEMALE BREAST: ICD-10-CM

## 2018-05-16 DIAGNOSIS — R92.1 MAMMOGRAPHIC CALCIFICATION FOUND ON DIAGNOSTIC IMAGING OF BREAST: Chronic | ICD-10-CM

## 2018-05-16 RX ORDER — FAMOTIDINE 10 MG/ML
20 INJECTION INTRAVENOUS ONCE
Qty: 0 | Refills: 0 | Status: COMPLETED | OUTPATIENT
Start: 2018-05-16 | End: 2018-05-16

## 2018-05-16 RX ORDER — ACETAMINOPHEN 500 MG
650 TABLET ORAL ONCE
Qty: 0 | Refills: 0 | Status: COMPLETED | OUTPATIENT
Start: 2018-05-16 | End: 2018-05-16

## 2018-05-16 RX ORDER — MAGNESIUM SULFATE 500 MG/ML
2 VIAL (ML) INJECTION ONCE
Qty: 0 | Refills: 0 | Status: COMPLETED | OUTPATIENT
Start: 2018-05-16 | End: 2018-05-16

## 2018-05-16 RX ORDER — PERTUZUMAB 30 MG/ML
420 INJECTION, SOLUTION, CONCENTRATE INTRAVENOUS ONCE
Qty: 0 | Refills: 0 | Status: COMPLETED | OUTPATIENT
Start: 2018-05-16 | End: 2018-05-16

## 2018-05-16 RX ORDER — CARBOPLATIN 50 MG
470 VIAL (EA) INTRAVENOUS ONCE
Qty: 0 | Refills: 0 | Status: COMPLETED | OUTPATIENT
Start: 2018-05-16 | End: 2018-05-16

## 2018-05-16 RX ORDER — DEXAMETHASONE 0.5 MG/5ML
20 ELIXIR ORAL ONCE
Qty: 0 | Refills: 0 | Status: COMPLETED | OUTPATIENT
Start: 2018-05-16 | End: 2018-05-16

## 2018-05-16 RX ORDER — TRASTUZUMAB-DKST 420 MG/20ML
440 INJECTION, POWDER, LYOPHILIZED, FOR SOLUTION INTRAVENOUS ONCE
Qty: 0 | Refills: 0 | Status: COMPLETED | OUTPATIENT
Start: 2018-05-16 | End: 2018-05-16

## 2018-05-16 RX ORDER — PEGFILGRASTIM-CBQV 6 MG/.6ML
6 INJECTION, SOLUTION SUBCUTANEOUS ONCE
Qty: 0 | Refills: 0 | Status: DISCONTINUED | OUTPATIENT
Start: 2018-05-16 | End: 2018-08-19

## 2018-05-16 RX ORDER — DOCETAXEL 20 MG/ML
135 INJECTION, SOLUTION, CONCENTRATE INTRAVENOUS ONCE
Qty: 0 | Refills: 0 | Status: COMPLETED | OUTPATIENT
Start: 2018-05-16 | End: 2018-05-16

## 2018-05-16 RX ORDER — DIPHENHYDRAMINE HCL 50 MG
25 CAPSULE ORAL ONCE
Qty: 0 | Refills: 0 | Status: COMPLETED | OUTPATIENT
Start: 2018-05-16 | End: 2018-05-16

## 2018-05-16 RX ADMIN — Medication 189 MILLIGRAM(S): at 09:45

## 2018-05-16 RX ADMIN — DOCETAXEL 256.75 MILLIGRAM(S): 20 INJECTION, SOLUTION, CONCENTRATE INTRAVENOUS at 11:00

## 2018-05-16 RX ADMIN — Medication 650 MILLIGRAM(S): at 09:45

## 2018-05-16 RX ADMIN — Medication 151.5 MILLIGRAM(S): at 09:46

## 2018-05-16 RX ADMIN — Medication 650 MILLIGRAM(S): at 09:47

## 2018-05-16 RX ADMIN — Medication 50 GRAM(S): at 09:47

## 2018-05-16 RX ADMIN — TRASTUZUMAB-DKST 541.88 MILLIGRAM(S): 420 INJECTION, POWDER, LYOPHILIZED, FOR SOLUTION INTRAVENOUS at 11:00

## 2018-05-16 RX ADMIN — FAMOTIDINE 156 MILLIGRAM(S): 10 INJECTION INTRAVENOUS at 09:46

## 2018-05-16 RX ADMIN — PERTUZUMAB 264 MILLIGRAM(S): 30 INJECTION, SOLUTION, CONCENTRATE INTRAVENOUS at 10:59

## 2018-05-16 RX ADMIN — Medication 297 MILLIGRAM(S): at 11:00

## 2018-06-04 ENCOUNTER — APPOINTMENT (OUTPATIENT)
Dept: HEMATOLOGY ONCOLOGY | Facility: CLINIC | Age: 68
End: 2018-06-04

## 2018-06-04 ENCOUNTER — LABORATORY RESULT (OUTPATIENT)
Age: 68
End: 2018-06-04

## 2018-06-04 VITALS
HEART RATE: 66 BPM | BODY MASS INDEX: 25.27 KG/M2 | WEIGHT: 161 LBS | TEMPERATURE: 97.6 F | DIASTOLIC BLOOD PRESSURE: 76 MMHG | HEIGHT: 67 IN | SYSTOLIC BLOOD PRESSURE: 120 MMHG | RESPIRATION RATE: 16 BRPM

## 2018-06-06 ENCOUNTER — OUTPATIENT (OUTPATIENT)
Dept: OUTPATIENT SERVICES | Facility: HOSPITAL | Age: 68
LOS: 1 days | Discharge: HOME | End: 2018-06-06

## 2018-06-06 ENCOUNTER — RX RENEWAL (OUTPATIENT)
Age: 68
End: 2018-06-06

## 2018-06-06 ENCOUNTER — APPOINTMENT (OUTPATIENT)
Dept: INFUSION THERAPY | Facility: CLINIC | Age: 68
End: 2018-06-06

## 2018-06-06 DIAGNOSIS — R92.1 MAMMOGRAPHIC CALCIFICATION FOUND ON DIAGNOSTIC IMAGING OF BREAST: Chronic | ICD-10-CM

## 2018-06-06 DIAGNOSIS — Z90.89 ACQUIRED ABSENCE OF OTHER ORGANS: Chronic | ICD-10-CM

## 2018-06-06 DIAGNOSIS — Z87.2 PERSONAL HISTORY OF DISEASES OF THE SKIN AND SUBCUTANEOUS TISSUE: Chronic | ICD-10-CM

## 2018-06-06 RX ORDER — DIPHENHYDRAMINE HCL 50 MG
25 CAPSULE ORAL ONCE
Qty: 0 | Refills: 0 | Status: COMPLETED | OUTPATIENT
Start: 2018-06-06 | End: 2018-06-06

## 2018-06-06 RX ORDER — TRASTUZUMAB-DKST 420 MG/20ML
440 INJECTION, POWDER, LYOPHILIZED, FOR SOLUTION INTRAVENOUS ONCE
Qty: 0 | Refills: 0 | Status: COMPLETED | OUTPATIENT
Start: 2018-06-06 | End: 2018-06-06

## 2018-06-06 RX ORDER — DOCETAXEL 20 MG/ML
135 INJECTION, SOLUTION, CONCENTRATE INTRAVENOUS ONCE
Qty: 0 | Refills: 0 | Status: COMPLETED | OUTPATIENT
Start: 2018-06-06 | End: 2018-06-06

## 2018-06-06 RX ORDER — ACETAMINOPHEN 500 MG
650 TABLET ORAL ONCE
Qty: 0 | Refills: 0 | Status: COMPLETED | OUTPATIENT
Start: 2018-06-06 | End: 2018-06-06

## 2018-06-06 RX ORDER — CARBOPLATIN 50 MG
470 VIAL (EA) INTRAVENOUS ONCE
Qty: 0 | Refills: 0 | Status: COMPLETED | OUTPATIENT
Start: 2018-06-06 | End: 2018-06-06

## 2018-06-06 RX ORDER — FAMOTIDINE 10 MG/ML
20 INJECTION INTRAVENOUS ONCE
Qty: 0 | Refills: 0 | Status: COMPLETED | OUTPATIENT
Start: 2018-06-06 | End: 2018-06-06

## 2018-06-06 RX ORDER — PERTUZUMAB 30 MG/ML
420 INJECTION, SOLUTION, CONCENTRATE INTRAVENOUS ONCE
Qty: 0 | Refills: 0 | Status: COMPLETED | OUTPATIENT
Start: 2018-06-06 | End: 2018-06-06

## 2018-06-06 RX ORDER — PEGFILGRASTIM-CBQV 6 MG/.6ML
6 INJECTION, SOLUTION SUBCUTANEOUS ONCE
Qty: 0 | Refills: 0 | Status: COMPLETED | OUTPATIENT
Start: 2018-06-06 | End: 2018-06-06

## 2018-06-06 RX ORDER — DEXAMETHASONE 0.5 MG/5ML
20 ELIXIR ORAL ONCE
Qty: 0 | Refills: 0 | Status: COMPLETED | OUTPATIENT
Start: 2018-06-06 | End: 2018-06-06

## 2018-06-06 RX ADMIN — FAMOTIDINE 156 MILLIGRAM(S): 10 INJECTION INTRAVENOUS at 09:34

## 2018-06-06 RX ADMIN — PEGFILGRASTIM-CBQV 6 MILLIGRAM(S): 6 INJECTION, SOLUTION SUBCUTANEOUS at 15:19

## 2018-06-06 RX ADMIN — Medication 151.5 MILLIGRAM(S): at 09:34

## 2018-06-06 RX ADMIN — PERTUZUMAB 264 MILLIGRAM(S): 30 INJECTION, SOLUTION, CONCENTRATE INTRAVENOUS at 10:34

## 2018-06-06 RX ADMIN — Medication 189 MILLIGRAM(S): at 09:34

## 2018-06-06 RX ADMIN — DOCETAXEL 256.75 MILLIGRAM(S): 20 INJECTION, SOLUTION, CONCENTRATE INTRAVENOUS at 10:33

## 2018-06-06 RX ADMIN — TRASTUZUMAB-DKST 541.88 MILLIGRAM(S): 420 INJECTION, POWDER, LYOPHILIZED, FOR SOLUTION INTRAVENOUS at 10:35

## 2018-06-06 RX ADMIN — Medication 650 MILLIGRAM(S): at 10:35

## 2018-06-06 RX ADMIN — Medication 650 MILLIGRAM(S): at 09:34

## 2018-06-06 RX ADMIN — Medication 297 MILLIGRAM(S): at 10:34

## 2018-06-08 DIAGNOSIS — C50.812 MALIGNANT NEOPLASM OF OVERLAPPING SITES OF LEFT FEMALE BREAST: ICD-10-CM

## 2018-06-08 DIAGNOSIS — Z51.11 ENCOUNTER FOR ANTINEOPLASTIC CHEMOTHERAPY: ICD-10-CM

## 2018-06-25 ENCOUNTER — APPOINTMENT (OUTPATIENT)
Dept: HEMATOLOGY ONCOLOGY | Facility: CLINIC | Age: 68
End: 2018-06-25

## 2018-06-25 ENCOUNTER — LABORATORY RESULT (OUTPATIENT)
Age: 68
End: 2018-06-25

## 2018-06-25 VITALS
BODY MASS INDEX: 24.43 KG/M2 | DIASTOLIC BLOOD PRESSURE: 59 MMHG | WEIGHT: 156 LBS | RESPIRATION RATE: 16 BRPM | SYSTOLIC BLOOD PRESSURE: 99 MMHG | HEART RATE: 66 BPM | TEMPERATURE: 97 F

## 2018-06-25 LAB
ALBUMIN SERPL ELPH-MCNC: 4 G/DL
ALP BLD-CCNC: 63 U/L
ALT SERPL-CCNC: 25 U/L
ANION GAP SERPL CALC-SCNC: 14 MMOL/L
AST SERPL-CCNC: 22 U/L
BILIRUB DIRECT SERPL-MCNC: <0.2 MG/DL
BILIRUB INDIRECT SERPL-MCNC: >0.1 MG/DL
BILIRUB SERPL-MCNC: 0.3 MG/DL
BUN SERPL-MCNC: 19 MG/DL
CALCIUM SERPL-MCNC: 10.2 MG/DL
CHLORIDE SERPL-SCNC: 102 MMOL/L
CO2 SERPL-SCNC: 24 MMOL/L
CREAT SERPL-MCNC: 0.9 MG/DL
GLUCOSE SERPL-MCNC: 77 MG/DL
HCT VFR BLD CALC: 32.7 %
HCT VFR BLD CALC: 33.9 %
HGB BLD-MCNC: 11 G/DL
HGB BLD-MCNC: 11.4 G/DL
MAGNESIUM SERPL-MCNC: 1.7 MG/DL
MCHC RBC-ENTMCNC: 31.8 PG
MCHC RBC-ENTMCNC: 32.4 PG
MCHC RBC-ENTMCNC: 33.6 G/DL
MCHC RBC-ENTMCNC: 33.6 G/DL
MCV RBC AUTO: 94.4 FL
MCV RBC AUTO: 96.5 FL
PLATELET # BLD AUTO: 153 K/UL
PLATELET # BLD AUTO: 159 K/UL
PMV BLD: 9.6 FL
PMV BLD: 9.6 FL
POTASSIUM SERPL-SCNC: 4.6 MMOL/L
PROT SERPL-MCNC: 6.6 G/DL
RBC # BLD: 3.39 M/UL
RBC # BLD: 3.59 M/UL
RBC # FLD: 14.5 %
RBC # FLD: 16 %
SODIUM SERPL-SCNC: 140 MMOL/L
WBC # FLD AUTO: 6.34 K/UL
WBC # FLD AUTO: 7.21 K/UL

## 2018-06-26 LAB
ALBUMIN SERPL ELPH-MCNC: 3.9 G/DL
ALP BLD-CCNC: 62 U/L
ALT SERPL-CCNC: 23 U/L
ANION GAP SERPL CALC-SCNC: 14 MMOL/L
AST SERPL-CCNC: 21 U/L
BILIRUB DIRECT SERPL-MCNC: <0.2 MG/DL
BILIRUB INDIRECT SERPL-MCNC: >0.1 MG/DL
BILIRUB SERPL-MCNC: 0.3 MG/DL
BUN SERPL-MCNC: 28 MG/DL
CALCIUM SERPL-MCNC: 9.4 MG/DL
CHLORIDE SERPL-SCNC: 101 MMOL/L
CO2 SERPL-SCNC: 23 MMOL/L
CREAT SERPL-MCNC: 1 MG/DL
GLUCOSE SERPL-MCNC: 98 MG/DL
MAGNESIUM SERPL-MCNC: 1.6 MG/DL
POTASSIUM SERPL-SCNC: 4.5 MMOL/L
PROT SERPL-MCNC: 6.4 G/DL
SODIUM SERPL-SCNC: 138 MMOL/L

## 2018-06-27 ENCOUNTER — APPOINTMENT (OUTPATIENT)
Dept: INFUSION THERAPY | Facility: CLINIC | Age: 68
End: 2018-06-27

## 2018-06-27 RX ORDER — DEXAMETHASONE 0.5 MG/5ML
20 ELIXIR ORAL ONCE
Qty: 0 | Refills: 0 | Status: COMPLETED | OUTPATIENT
Start: 2018-06-27 | End: 2018-06-27

## 2018-06-27 RX ORDER — TRASTUZUMAB-DKST 420 MG/20ML
425 INJECTION, POWDER, LYOPHILIZED, FOR SOLUTION INTRAVENOUS ONCE
Qty: 0 | Refills: 0 | Status: COMPLETED | OUTPATIENT
Start: 2018-06-27 | End: 2018-06-27

## 2018-06-27 RX ORDER — PERTUZUMAB 30 MG/ML
420 INJECTION, SOLUTION, CONCENTRATE INTRAVENOUS ONCE
Qty: 0 | Refills: 0 | Status: COMPLETED | OUTPATIENT
Start: 2018-06-27 | End: 2018-06-27

## 2018-06-27 RX ORDER — MAGNESIUM SULFATE 500 MG/ML
2 VIAL (ML) INJECTION ONCE
Qty: 0 | Refills: 0 | Status: DISCONTINUED | OUTPATIENT
Start: 2018-06-27 | End: 2019-03-06

## 2018-06-27 RX ORDER — FAMOTIDINE 10 MG/ML
20 INJECTION INTRAVENOUS ONCE
Qty: 0 | Refills: 0 | Status: COMPLETED | OUTPATIENT
Start: 2018-06-27 | End: 2018-06-27

## 2018-06-27 RX ORDER — PEGFILGRASTIM-CBQV 6 MG/.6ML
6 INJECTION, SOLUTION SUBCUTANEOUS ONCE
Qty: 0 | Refills: 0 | Status: DISCONTINUED | OUTPATIENT
Start: 2018-06-27 | End: 2019-03-06

## 2018-06-27 RX ORDER — CARBOPLATIN 50 MG
425 VIAL (EA) INTRAVENOUS ONCE
Qty: 0 | Refills: 0 | Status: COMPLETED | OUTPATIENT
Start: 2018-06-27 | End: 2018-06-27

## 2018-06-27 RX ORDER — DOCETAXEL 20 MG/ML
135 INJECTION, SOLUTION, CONCENTRATE INTRAVENOUS ONCE
Qty: 0 | Refills: 0 | Status: COMPLETED | OUTPATIENT
Start: 2018-06-27 | End: 2018-06-27

## 2018-06-27 RX ORDER — ACETAMINOPHEN 500 MG
650 TABLET ORAL ONCE
Qty: 0 | Refills: 0 | Status: COMPLETED | OUTPATIENT
Start: 2018-06-27 | End: 2018-06-27

## 2018-06-27 RX ORDER — DIPHENHYDRAMINE HCL 50 MG
25 CAPSULE ORAL ONCE
Qty: 0 | Refills: 0 | Status: COMPLETED | OUTPATIENT
Start: 2018-06-27 | End: 2018-06-27

## 2018-06-27 RX ADMIN — Medication 189 MILLIGRAM(S): at 11:11

## 2018-06-27 RX ADMIN — TRASTUZUMAB-DKST 540.46 MILLIGRAM(S): 420 INJECTION, POWDER, LYOPHILIZED, FOR SOLUTION INTRAVENOUS at 12:23

## 2018-06-27 RX ADMIN — Medication 292.5 MILLIGRAM(S): at 12:22

## 2018-06-27 RX ADMIN — DOCETAXEL 256.75 MILLIGRAM(S): 20 INJECTION, SOLUTION, CONCENTRATE INTRAVENOUS at 12:24

## 2018-06-27 RX ADMIN — PERTUZUMAB 264 MILLIGRAM(S): 30 INJECTION, SOLUTION, CONCENTRATE INTRAVENOUS at 12:23

## 2018-06-27 RX ADMIN — Medication 151.5 MILLIGRAM(S): at 11:12

## 2018-06-27 RX ADMIN — Medication 650 MILLIGRAM(S): at 11:11

## 2018-06-27 RX ADMIN — FAMOTIDINE 156 MILLIGRAM(S): 10 INJECTION INTRAVENOUS at 11:11

## 2018-06-27 RX ADMIN — Medication 650 MILLIGRAM(S): at 11:12

## 2018-07-11 ENCOUNTER — RX RENEWAL (OUTPATIENT)
Age: 68
End: 2018-07-11

## 2018-07-16 ENCOUNTER — LABORATORY RESULT (OUTPATIENT)
Age: 68
End: 2018-07-16

## 2018-07-16 ENCOUNTER — APPOINTMENT (OUTPATIENT)
Dept: HEMATOLOGY ONCOLOGY | Facility: CLINIC | Age: 68
End: 2018-07-16

## 2018-07-16 VITALS
BODY MASS INDEX: 24.64 KG/M2 | WEIGHT: 157 LBS | SYSTOLIC BLOOD PRESSURE: 108 MMHG | RESPIRATION RATE: 14 BRPM | HEIGHT: 67 IN | TEMPERATURE: 99.3 F | HEART RATE: 69 BPM | DIASTOLIC BLOOD PRESSURE: 64 MMHG

## 2018-07-16 DIAGNOSIS — Z51.89 ENCOUNTER FOR OTHER SPECIFIED AFTERCARE: ICD-10-CM

## 2018-07-16 LAB
HCT VFR BLD CALC: 32 %
HGB BLD-MCNC: 10.6 G/DL
MCHC RBC-ENTMCNC: 33.1 G/DL
MCHC RBC-ENTMCNC: 33.3 PG
MCV RBC AUTO: 100.6 FL
PLATELET # BLD AUTO: 199 K/UL
PMV BLD: 9.7 FL
RBC # BLD: 3.18 M/UL
RBC # FLD: 13.8 %
WBC # FLD AUTO: 6.28 K/UL

## 2018-07-17 PROBLEM — K44.9 DIAPHRAGMATIC HERNIA WITHOUT OBSTRUCTION OR GANGRENE: Chronic | Status: ACTIVE | Noted: 2018-02-27

## 2018-07-17 PROBLEM — F41.9 ANXIETY DISORDER, UNSPECIFIED: Chronic | Status: ACTIVE | Noted: 2018-02-27

## 2018-07-17 PROBLEM — K22.70 BARRETT'S ESOPHAGUS WITHOUT DYSPLASIA: Chronic | Status: ACTIVE | Noted: 2018-02-27

## 2018-07-17 PROBLEM — I10 ESSENTIAL (PRIMARY) HYPERTENSION: Chronic | Status: ACTIVE | Noted: 2018-02-27

## 2018-07-17 PROBLEM — E04.1 NONTOXIC SINGLE THYROID NODULE: Chronic | Status: ACTIVE | Noted: 2018-02-27

## 2018-07-17 PROBLEM — I34.1 NONRHEUMATIC MITRAL (VALVE) PROLAPSE: Chronic | Status: ACTIVE | Noted: 2018-02-27

## 2018-07-17 PROBLEM — E03.9 HYPOTHYROIDISM, UNSPECIFIED: Chronic | Status: ACTIVE | Noted: 2018-02-27

## 2018-07-17 PROBLEM — K21.9 GASTRO-ESOPHAGEAL REFLUX DISEASE WITHOUT ESOPHAGITIS: Chronic | Status: ACTIVE | Noted: 2018-02-27

## 2018-07-17 LAB
ANION GAP SERPL CALC-SCNC: 14 MMOL/L
BUN SERPL-MCNC: 25 MG/DL
CALCIUM SERPL-MCNC: 9.7 MG/DL
CHLORIDE SERPL-SCNC: 103 MMOL/L
CO2 SERPL-SCNC: 22 MMOL/L
CREAT SERPL-MCNC: 1 MG/DL
GLUCOSE SERPL-MCNC: 100 MG/DL
MAGNESIUM SERPL-MCNC: 1.9 MG/DL
POTASSIUM SERPL-SCNC: 5.2 MMOL/L
SODIUM SERPL-SCNC: 139 MMOL/L
T4 FREE SERPL-MCNC: 1.3 NG/DL
TSH SERPL-ACNC: 1.84 UIU/ML

## 2018-07-18 ENCOUNTER — APPOINTMENT (OUTPATIENT)
Dept: INFUSION THERAPY | Facility: CLINIC | Age: 68
End: 2018-07-18

## 2018-07-18 RX ORDER — PERTUZUMAB 30 MG/ML
420 INJECTION, SOLUTION, CONCENTRATE INTRAVENOUS ONCE
Qty: 0 | Refills: 0 | Status: COMPLETED | OUTPATIENT
Start: 2018-07-18 | End: 2018-07-18

## 2018-07-18 RX ORDER — DIPHENHYDRAMINE HCL 50 MG
25 CAPSULE ORAL ONCE
Qty: 0 | Refills: 0 | Status: COMPLETED | OUTPATIENT
Start: 2018-07-18 | End: 2018-07-18

## 2018-07-18 RX ORDER — ACETAMINOPHEN 500 MG
650 TABLET ORAL ONCE
Qty: 0 | Refills: 0 | Status: COMPLETED | OUTPATIENT
Start: 2018-07-18 | End: 2018-07-18

## 2018-07-18 RX ORDER — TRASTUZUMAB-DKST 420 MG/20ML
425 INJECTION, POWDER, LYOPHILIZED, FOR SOLUTION INTRAVENOUS ONCE
Qty: 0 | Refills: 0 | Status: COMPLETED | OUTPATIENT
Start: 2018-07-18 | End: 2018-07-18

## 2018-07-18 RX ADMIN — PERTUZUMAB 264 MILLIGRAM(S): 30 INJECTION, SOLUTION, CONCENTRATE INTRAVENOUS at 12:36

## 2018-07-18 RX ADMIN — Medication 151.5 MILLIGRAM(S): at 11:29

## 2018-07-18 RX ADMIN — TRASTUZUMAB-DKST 540.46 MILLIGRAM(S): 420 INJECTION, POWDER, LYOPHILIZED, FOR SOLUTION INTRAVENOUS at 12:36

## 2018-07-18 RX ADMIN — Medication 650 MILLIGRAM(S): at 11:28

## 2018-07-22 ENCOUNTER — FORM ENCOUNTER (OUTPATIENT)
Age: 68
End: 2018-07-22

## 2018-07-23 ENCOUNTER — OUTPATIENT (OUTPATIENT)
Dept: OUTPATIENT SERVICES | Facility: HOSPITAL | Age: 68
LOS: 1 days | Discharge: HOME | End: 2018-07-23

## 2018-07-23 DIAGNOSIS — Z90.89 ACQUIRED ABSENCE OF OTHER ORGANS: Chronic | ICD-10-CM

## 2018-07-23 DIAGNOSIS — C50.412 MALIGNANT NEOPLASM OF UPPER-OUTER QUADRANT OF LEFT FEMALE BREAST: ICD-10-CM

## 2018-07-23 DIAGNOSIS — R92.1 MAMMOGRAPHIC CALCIFICATION FOUND ON DIAGNOSTIC IMAGING OF BREAST: Chronic | ICD-10-CM

## 2018-07-23 DIAGNOSIS — Z87.2 PERSONAL HISTORY OF DISEASES OF THE SKIN AND SUBCUTANEOUS TISSUE: Chronic | ICD-10-CM

## 2018-07-23 DIAGNOSIS — N64.89 OTHER SPECIFIED DISORDERS OF BREAST: ICD-10-CM

## 2018-07-27 ENCOUNTER — OUTPATIENT (OUTPATIENT)
Dept: OUTPATIENT SERVICES | Facility: HOSPITAL | Age: 68
LOS: 1 days | Discharge: HOME | End: 2018-07-27

## 2018-07-27 DIAGNOSIS — C50.412 MALIGNANT NEOPLASM OF UPPER-OUTER QUADRANT OF LEFT FEMALE BREAST: ICD-10-CM

## 2018-07-27 DIAGNOSIS — Z87.2 PERSONAL HISTORY OF DISEASES OF THE SKIN AND SUBCUTANEOUS TISSUE: Chronic | ICD-10-CM

## 2018-07-27 DIAGNOSIS — Z90.89 ACQUIRED ABSENCE OF OTHER ORGANS: Chronic | ICD-10-CM

## 2018-07-27 DIAGNOSIS — R92.1 MAMMOGRAPHIC CALCIFICATION FOUND ON DIAGNOSTIC IMAGING OF BREAST: Chronic | ICD-10-CM

## 2018-07-30 ENCOUNTER — APPOINTMENT (OUTPATIENT)
Dept: HEMATOLOGY ONCOLOGY | Facility: CLINIC | Age: 68
End: 2018-07-30

## 2018-07-30 VITALS — DIASTOLIC BLOOD PRESSURE: 69 MMHG | TEMPERATURE: 97.7 F | HEART RATE: 64 BPM | SYSTOLIC BLOOD PRESSURE: 123 MMHG

## 2018-07-31 ENCOUNTER — OUTPATIENT (OUTPATIENT)
Dept: OUTPATIENT SERVICES | Facility: HOSPITAL | Age: 68
LOS: 1 days | Discharge: HOME | End: 2018-07-31

## 2018-07-31 DIAGNOSIS — Z87.2 PERSONAL HISTORY OF DISEASES OF THE SKIN AND SUBCUTANEOUS TISSUE: Chronic | ICD-10-CM

## 2018-07-31 DIAGNOSIS — R92.1 MAMMOGRAPHIC CALCIFICATION FOUND ON DIAGNOSTIC IMAGING OF BREAST: Chronic | ICD-10-CM

## 2018-07-31 DIAGNOSIS — Z51.11 ENCOUNTER FOR ANTINEOPLASTIC CHEMOTHERAPY: ICD-10-CM

## 2018-07-31 DIAGNOSIS — Z90.89 ACQUIRED ABSENCE OF OTHER ORGANS: Chronic | ICD-10-CM

## 2018-08-06 ENCOUNTER — APPOINTMENT (OUTPATIENT)
Dept: HEMATOLOGY ONCOLOGY | Facility: CLINIC | Age: 68
End: 2018-08-06

## 2018-08-06 ENCOUNTER — LABORATORY RESULT (OUTPATIENT)
Age: 68
End: 2018-08-06

## 2018-08-06 ENCOUNTER — APPOINTMENT (OUTPATIENT)
Dept: INFUSION THERAPY | Facility: CLINIC | Age: 68
End: 2018-08-06

## 2018-08-06 RX ORDER — TRASTUZUMAB-DKST 420 MG/20ML
425 INJECTION, POWDER, LYOPHILIZED, FOR SOLUTION INTRAVENOUS ONCE
Qty: 0 | Refills: 0 | Status: COMPLETED | OUTPATIENT
Start: 2018-08-06 | End: 2018-08-06

## 2018-08-06 RX ORDER — PERTUZUMAB 30 MG/ML
420 INJECTION, SOLUTION, CONCENTRATE INTRAVENOUS ONCE
Qty: 0 | Refills: 0 | Status: COMPLETED | OUTPATIENT
Start: 2018-08-06 | End: 2018-08-06

## 2018-08-06 RX ORDER — DIPHENHYDRAMINE HCL 50 MG
25 CAPSULE ORAL ONCE
Qty: 0 | Refills: 0 | Status: COMPLETED | OUTPATIENT
Start: 2018-08-06 | End: 2018-08-06

## 2018-08-06 RX ORDER — ACETAMINOPHEN 500 MG
650 TABLET ORAL ONCE
Qty: 0 | Refills: 0 | Status: COMPLETED | OUTPATIENT
Start: 2018-08-06 | End: 2018-08-06

## 2018-08-06 RX ADMIN — Medication 650 MILLIGRAM(S): at 13:53

## 2018-08-06 RX ADMIN — PERTUZUMAB 264 MILLIGRAM(S): 30 INJECTION, SOLUTION, CONCENTRATE INTRAVENOUS at 14:09

## 2018-08-06 RX ADMIN — Medication 151.5 MILLIGRAM(S): at 13:53

## 2018-08-06 RX ADMIN — TRASTUZUMAB-DKST 540.46 MILLIGRAM(S): 420 INJECTION, POWDER, LYOPHILIZED, FOR SOLUTION INTRAVENOUS at 14:09

## 2018-08-07 ENCOUNTER — APPOINTMENT (OUTPATIENT)
Dept: BREAST CENTER | Facility: CLINIC | Age: 68
End: 2018-08-07
Payer: MEDICARE

## 2018-08-07 VITALS
OXYGEN SATURATION: 97 % | WEIGHT: 157 LBS | BODY MASS INDEX: 24.64 KG/M2 | DIASTOLIC BLOOD PRESSURE: 70 MMHG | HEART RATE: 71 BPM | HEIGHT: 67 IN | SYSTOLIC BLOOD PRESSURE: 118 MMHG

## 2018-08-07 PROCEDURE — 99214 OFFICE O/P EST MOD 30 MIN: CPT

## 2018-08-07 RX ORDER — VALSARTAN 160 MG/1
160 TABLET, COATED ORAL
Qty: 90 | Refills: 0 | Status: COMPLETED | COMMUNITY
Start: 2018-01-08 | End: 2018-08-07

## 2018-08-07 RX ORDER — LOSARTAN POTASSIUM 100 MG/1
TABLET, FILM COATED ORAL
Refills: 0 | Status: ACTIVE | COMMUNITY

## 2018-08-10 ENCOUNTER — OUTPATIENT (OUTPATIENT)
Dept: OUTPATIENT SERVICES | Facility: HOSPITAL | Age: 68
LOS: 1 days | Discharge: HOME | End: 2018-08-10

## 2018-08-10 VITALS
WEIGHT: 158.73 LBS | RESPIRATION RATE: 58 BRPM | DIASTOLIC BLOOD PRESSURE: 70 MMHG | SYSTOLIC BLOOD PRESSURE: 120 MMHG | HEIGHT: 67 IN | OXYGEN SATURATION: 98 % | TEMPERATURE: 98 F | HEART RATE: 58 BPM

## 2018-08-10 DIAGNOSIS — Z01.818 ENCOUNTER FOR OTHER PREPROCEDURAL EXAMINATION: ICD-10-CM

## 2018-08-10 DIAGNOSIS — Z90.89 ACQUIRED ABSENCE OF OTHER ORGANS: Chronic | ICD-10-CM

## 2018-08-10 DIAGNOSIS — R92.1 MAMMOGRAPHIC CALCIFICATION FOUND ON DIAGNOSTIC IMAGING OF BREAST: Chronic | ICD-10-CM

## 2018-08-10 DIAGNOSIS — C50.412 MALIGNANT NEOPLASM OF UPPER-OUTER QUADRANT OF LEFT FEMALE BREAST: ICD-10-CM

## 2018-08-10 DIAGNOSIS — Z87.2 PERSONAL HISTORY OF DISEASES OF THE SKIN AND SUBCUTANEOUS TISSUE: Chronic | ICD-10-CM

## 2018-08-10 LAB
ALBUMIN SERPL ELPH-MCNC: 4.2 G/DL — SIGNIFICANT CHANGE UP (ref 3.5–5.2)
ALP SERPL-CCNC: 64 U/L — SIGNIFICANT CHANGE UP (ref 30–115)
ALT FLD-CCNC: 21 U/L — SIGNIFICANT CHANGE UP (ref 0–41)
ANION GAP SERPL CALC-SCNC: 16 MMOL/L — HIGH (ref 7–14)
APTT BLD: 30.4 SEC — SIGNIFICANT CHANGE UP (ref 27–39.2)
AST SERPL-CCNC: 19 U/L — SIGNIFICANT CHANGE UP (ref 0–41)
BASOPHILS # BLD AUTO: 0.03 K/UL — SIGNIFICANT CHANGE UP (ref 0–0.2)
BASOPHILS NFR BLD AUTO: 0.5 % — SIGNIFICANT CHANGE UP (ref 0–1)
BILIRUB SERPL-MCNC: 0.2 MG/DL — SIGNIFICANT CHANGE UP (ref 0.2–1.2)
BUN SERPL-MCNC: 26 MG/DL — HIGH (ref 10–20)
CALCIUM SERPL-MCNC: 9.7 MG/DL — SIGNIFICANT CHANGE UP (ref 8.5–10.1)
CHLORIDE SERPL-SCNC: 102 MMOL/L — SIGNIFICANT CHANGE UP (ref 98–110)
CO2 SERPL-SCNC: 23 MMOL/L — SIGNIFICANT CHANGE UP (ref 17–32)
CREAT SERPL-MCNC: 0.8 MG/DL — SIGNIFICANT CHANGE UP (ref 0.7–1.5)
EOSINOPHIL # BLD AUTO: 0.22 K/UL — SIGNIFICANT CHANGE UP (ref 0–0.7)
EOSINOPHIL NFR BLD AUTO: 3.4 % — SIGNIFICANT CHANGE UP (ref 0–8)
GLUCOSE SERPL-MCNC: 92 MG/DL — SIGNIFICANT CHANGE UP (ref 70–99)
HCT VFR BLD CALC: 33.8 % — LOW (ref 37–47)
HGB BLD-MCNC: 11.6 G/DL — LOW (ref 12–16)
IMM GRANULOCYTES NFR BLD AUTO: 0.5 % — HIGH (ref 0.1–0.3)
INR BLD: 1.13 RATIO — SIGNIFICANT CHANGE UP (ref 0.65–1.3)
LYMPHOCYTES # BLD AUTO: 1.73 K/UL — SIGNIFICANT CHANGE UP (ref 1.2–3.4)
LYMPHOCYTES # BLD AUTO: 26.9 % — SIGNIFICANT CHANGE UP (ref 20.5–51.1)
MCHC RBC-ENTMCNC: 33.1 PG — HIGH (ref 27–31)
MCHC RBC-ENTMCNC: 34.3 G/DL — SIGNIFICANT CHANGE UP (ref 32–37)
MCV RBC AUTO: 96.6 FL — SIGNIFICANT CHANGE UP (ref 81–99)
MONOCYTES # BLD AUTO: 0.55 K/UL — SIGNIFICANT CHANGE UP (ref 0.1–0.6)
MONOCYTES NFR BLD AUTO: 8.5 % — SIGNIFICANT CHANGE UP (ref 1.7–9.3)
NEUTROPHILS # BLD AUTO: 3.88 K/UL — SIGNIFICANT CHANGE UP (ref 1.4–6.5)
NEUTROPHILS NFR BLD AUTO: 60.2 % — SIGNIFICANT CHANGE UP (ref 42.2–75.2)
NRBC # BLD: 0 /100 WBCS — SIGNIFICANT CHANGE UP (ref 0–0)
PLATELET # BLD AUTO: 155 K/UL — SIGNIFICANT CHANGE UP (ref 130–400)
POTASSIUM SERPL-MCNC: 4.3 MMOL/L — SIGNIFICANT CHANGE UP (ref 3.5–5)
POTASSIUM SERPL-SCNC: 4.3 MMOL/L — SIGNIFICANT CHANGE UP (ref 3.5–5)
PROT SERPL-MCNC: 6.9 G/DL — SIGNIFICANT CHANGE UP (ref 6–8)
PROTHROM AB SERPL-ACNC: 12.2 SEC — SIGNIFICANT CHANGE UP (ref 9.95–12.87)
RBC # BLD: 3.5 M/UL — LOW (ref 4.2–5.4)
RBC # FLD: 12.4 % — SIGNIFICANT CHANGE UP (ref 11.5–14.5)
SODIUM SERPL-SCNC: 141 MMOL/L — SIGNIFICANT CHANGE UP (ref 135–146)
WBC # BLD: 6.44 K/UL — SIGNIFICANT CHANGE UP (ref 4.8–10.8)
WBC # FLD AUTO: 6.44 K/UL — SIGNIFICANT CHANGE UP (ref 4.8–10.8)

## 2018-08-10 RX ORDER — GLUCOSAMINE/MSM/CHONDROITIN A 750-375MG
1 TABLET ORAL
Qty: 0 | Refills: 0 | COMMUNITY

## 2018-08-10 RX ORDER — VALSARTAN 80 MG/1
1 TABLET ORAL
Qty: 0 | Refills: 0 | COMMUNITY

## 2018-08-10 RX ORDER — OMEGA-3 ACID ETHYL ESTERS 1 G
1 CAPSULE ORAL
Qty: 0 | Refills: 0 | COMMUNITY

## 2018-08-10 NOTE — H&P PST ADULT - FAMILY HISTORY
Father  Still living? Unknown  CAD (coronary artery disease), Age at diagnosis: Age Unknown  HTN (hypertension), Age at diagnosis: Age Unknown     Grandparent  Still living? Unknown  CAD (coronary artery disease), Age at diagnosis: Age Unknown     Mother  Still living? No  Family history of pancreatic cancer, Age at diagnosis: Age Unknown     Aunt  Still living? Unknown  Family history of breast cancer, Age at diagnosis: Age Unknown

## 2018-08-10 NOTE — H&P PST ADULT - HISTORY OF PRESENT ILLNESS
68 Y/O FEMALE PRESENTS  TO PAST WITH HX  LEFT BREAST CA  PT NOW FOR SCHEDULED PROCEDURE. PT DENIES ANY CP SOB PALP COUGH DYSURIA FEVER URI. PT ABLE TO ELLIOTT 1-2 FOS W/O SOB

## 2018-08-20 LAB
ANION GAP SERPL CALC-SCNC: 20 MMOL/L
BUN SERPL-MCNC: 24 MG/DL
CALCIUM SERPL-MCNC: 9.3 MG/DL
CHLORIDE SERPL-SCNC: 99 MMOL/L
CO2 SERPL-SCNC: 21 MMOL/L
CREAT SERPL-MCNC: 0.9 MG/DL
GLUCOSE SERPL-MCNC: 147 MG/DL
HCT VFR BLD CALC: 37.7 %
HGB BLD-MCNC: 12.9 G/DL
MAGNESIUM SERPL-MCNC: 1.8 MG/DL
MCHC RBC-ENTMCNC: 33.2 PG
MCHC RBC-ENTMCNC: 34.2 G/DL
MCV RBC AUTO: 96.9 FL
PLATELET # BLD AUTO: 182 K/UL
PMV BLD: 10.3 FL
POTASSIUM SERPL-SCNC: 3.3 MMOL/L
RBC # BLD: 3.89 M/UL
RBC # FLD: 12.7 %
SODIUM SERPL-SCNC: 140 MMOL/L
WBC # FLD AUTO: 8.83 K/UL

## 2018-08-22 ENCOUNTER — FORM ENCOUNTER (OUTPATIENT)
Age: 68
End: 2018-08-22

## 2018-08-23 ENCOUNTER — FORM ENCOUNTER (OUTPATIENT)
Age: 68
End: 2018-08-23

## 2018-08-23 ENCOUNTER — OUTPATIENT (OUTPATIENT)
Dept: OUTPATIENT SERVICES | Facility: HOSPITAL | Age: 68
LOS: 1 days | Discharge: HOME | End: 2018-08-23

## 2018-08-23 DIAGNOSIS — C50.919 MALIGNANT NEOPLASM OF UNSPECIFIED SITE OF UNSPECIFIED FEMALE BREAST: ICD-10-CM

## 2018-08-23 DIAGNOSIS — Z90.89 ACQUIRED ABSENCE OF OTHER ORGANS: Chronic | ICD-10-CM

## 2018-08-23 DIAGNOSIS — Z87.2 PERSONAL HISTORY OF DISEASES OF THE SKIN AND SUBCUTANEOUS TISSUE: Chronic | ICD-10-CM

## 2018-08-23 DIAGNOSIS — R92.1 MAMMOGRAPHIC CALCIFICATION FOUND ON DIAGNOSTIC IMAGING OF BREAST: Chronic | ICD-10-CM

## 2018-08-24 ENCOUNTER — OUTPATIENT (OUTPATIENT)
Dept: OUTPATIENT SERVICES | Facility: HOSPITAL | Age: 68
LOS: 1 days | Discharge: HOME | End: 2018-08-24

## 2018-08-24 ENCOUNTER — APPOINTMENT (OUTPATIENT)
Dept: BREAST CENTER | Facility: AMBULATORY SURGERY CENTER | Age: 68
End: 2018-08-24
Payer: MEDICARE

## 2018-08-24 ENCOUNTER — RESULT REVIEW (OUTPATIENT)
Age: 68
End: 2018-08-24

## 2018-08-24 VITALS
HEART RATE: 65 BPM | DIASTOLIC BLOOD PRESSURE: 63 MMHG | SYSTOLIC BLOOD PRESSURE: 135 MMHG | OXYGEN SATURATION: 98 % | RESPIRATION RATE: 15 BRPM

## 2018-08-24 VITALS
HEART RATE: 59 BPM | SYSTOLIC BLOOD PRESSURE: 127 MMHG | RESPIRATION RATE: 16 BRPM | TEMPERATURE: 98 F | OXYGEN SATURATION: 98 % | WEIGHT: 158.73 LBS | DIASTOLIC BLOOD PRESSURE: 63 MMHG | HEIGHT: 67 IN

## 2018-08-24 DIAGNOSIS — Z90.89 ACQUIRED ABSENCE OF OTHER ORGANS: Chronic | ICD-10-CM

## 2018-08-24 DIAGNOSIS — Z87.2 PERSONAL HISTORY OF DISEASES OF THE SKIN AND SUBCUTANEOUS TISSUE: Chronic | ICD-10-CM

## 2018-08-24 DIAGNOSIS — R92.1 MAMMOGRAPHIC CALCIFICATION FOUND ON DIAGNOSTIC IMAGING OF BREAST: Chronic | ICD-10-CM

## 2018-08-24 PROCEDURE — 38525 BIOPSY/REMOVAL LYMPH NODES: CPT

## 2018-08-24 PROCEDURE — 38900 IO MAP OF SENT LYMPH NODE: CPT

## 2018-08-24 PROCEDURE — 19301 PARTIAL MASTECTOMY: CPT

## 2018-08-24 PROCEDURE — 19340 INSJ BREAST IMPLT SM D MAST: CPT

## 2018-08-24 PROCEDURE — 14001 TIS TRNFR TRUNK 10.1-30SQCM: CPT

## 2018-08-24 RX ORDER — OXYCODONE AND ACETAMINOPHEN 5; 325 MG/1; MG/1
1 TABLET ORAL EVERY 4 HOURS
Qty: 0 | Refills: 0 | Status: DISCONTINUED | OUTPATIENT
Start: 2018-08-24 | End: 2018-08-24

## 2018-08-24 RX ORDER — ONDANSETRON 8 MG/1
4 TABLET, FILM COATED ORAL ONCE
Qty: 0 | Refills: 0 | Status: DISCONTINUED | OUTPATIENT
Start: 2018-08-24 | End: 2018-09-08

## 2018-08-24 RX ORDER — SODIUM CHLORIDE 9 MG/ML
1000 INJECTION, SOLUTION INTRAVENOUS
Qty: 0 | Refills: 0 | Status: DISCONTINUED | OUTPATIENT
Start: 2018-08-24 | End: 2018-09-08

## 2018-08-24 RX ORDER — MORPHINE SULFATE 50 MG/1
2 CAPSULE, EXTENDED RELEASE ORAL
Qty: 0 | Refills: 0 | Status: DISCONTINUED | OUTPATIENT
Start: 2018-08-24 | End: 2018-08-24

## 2018-08-24 RX ADMIN — SODIUM CHLORIDE 100 MILLILITER(S): 9 INJECTION, SOLUTION INTRAVENOUS at 12:32

## 2018-08-24 NOTE — BRIEF OPERATIVE NOTE - PROCEDURE
<<-----Click on this checkbox to enter Procedure Lumpectomy of breast after needle localization of lesion, with biopsy of sentinel lymph node  08/24/2018    Active  [id number]

## 2018-08-24 NOTE — CHART NOTE - NSCHARTNOTEFT_GEN_A_CORE
PACU ANESTHESIA ADMISSION NOTE      Procedure: Lumpectomy of breast after needle localization of lesion, with biopsy of sentinel lymph node    Post op diagnosis:  Invasive carcinoma of breast      ____  Intubated  TV:______       Rate: ______      FiO2: ______    _x___  Patent Airway    _x___  Full return of protective reflexes    _x___  Full recovery from anesthesia / back to baseline status    itals:            T:   97.5             BP :     143/64           R: 17             Sat:  96             P:72      Mental Status:  _x___ Awake   _____ Alert   _____ Drowsy   _____ Sedated    Nausea/Vomiting:  _x___  NO       ______Yes,   See Post - Op Orders         Pain Scale (0-10):  __0___    Treatment: _x___ None    ____ See Post - Op/PCA Orders    Post - Operative Fluids:   __x__ Oral   ____ See Post - Op Orders    Plan: Discharge:   _x___Home       _____Floor     _____Critical Care    _____  Other:_________________    Comments:  No anesthesia issues or complications noted.  Discharge when criteria met.

## 2018-08-24 NOTE — ASU DISCHARGE PLAN (ADULT/PEDIATRIC). - SPECIAL INSTRUCTIONS
Regular diet.    No heavy lifting more than 15 lbs for two weeks.    Please remove plastic dressings in three days.  Leave white tape in place.  May shower tomorrow.  Wear a supportive bra.

## 2018-08-30 LAB — SURGICAL PATHOLOGY STUDY: SIGNIFICANT CHANGE UP

## 2018-09-04 ENCOUNTER — APPOINTMENT (OUTPATIENT)
Dept: BREAST CENTER | Facility: CLINIC | Age: 68
End: 2018-09-04
Payer: MEDICARE

## 2018-09-04 VITALS
HEIGHT: 67 IN | BODY MASS INDEX: 24.64 KG/M2 | WEIGHT: 157 LBS | HEART RATE: 79 BPM | RESPIRATION RATE: 98 BRPM | DIASTOLIC BLOOD PRESSURE: 85 MMHG | SYSTOLIC BLOOD PRESSURE: 122 MMHG

## 2018-09-04 PROCEDURE — 99024 POSTOP FOLLOW-UP VISIT: CPT

## 2018-09-05 DIAGNOSIS — N60.32 FIBROSCLEROSIS OF LEFT BREAST: ICD-10-CM

## 2018-09-05 DIAGNOSIS — R92.0 MAMMOGRAPHIC MICROCALCIFICATION FOUND ON DIAGNOSTIC IMAGING OF BREAST: ICD-10-CM

## 2018-09-05 DIAGNOSIS — F41.9 ANXIETY DISORDER, UNSPECIFIED: ICD-10-CM

## 2018-09-05 DIAGNOSIS — N63.20 UNSPECIFIED LUMP IN THE LEFT BREAST, UNSPECIFIED QUADRANT: ICD-10-CM

## 2018-09-05 DIAGNOSIS — D24.2 BENIGN NEOPLASM OF LEFT BREAST: ICD-10-CM

## 2018-09-05 DIAGNOSIS — I10 ESSENTIAL (PRIMARY) HYPERTENSION: ICD-10-CM

## 2018-09-05 DIAGNOSIS — E03.9 HYPOTHYROIDISM, UNSPECIFIED: ICD-10-CM

## 2018-09-05 DIAGNOSIS — N60.22 FIBROADENOSIS OF LEFT BREAST: ICD-10-CM

## 2018-09-07 ENCOUNTER — APPOINTMENT (OUTPATIENT)
Dept: HEMATOLOGY ONCOLOGY | Facility: CLINIC | Age: 68
End: 2018-09-07

## 2018-09-07 ENCOUNTER — LABORATORY RESULT (OUTPATIENT)
Age: 68
End: 2018-09-07

## 2018-09-07 ENCOUNTER — APPOINTMENT (OUTPATIENT)
Dept: INFUSION THERAPY | Facility: CLINIC | Age: 68
End: 2018-09-07

## 2018-09-07 VITALS
DIASTOLIC BLOOD PRESSURE: 73 MMHG | RESPIRATION RATE: 14 BRPM | HEIGHT: 67 IN | SYSTOLIC BLOOD PRESSURE: 120 MMHG | WEIGHT: 161 LBS | HEART RATE: 59 BPM | BODY MASS INDEX: 25.27 KG/M2 | TEMPERATURE: 96.7 F

## 2018-09-07 LAB
HCT VFR BLD CALC: 38.1 %
HGB BLD-MCNC: 12.8 G/DL
MCHC RBC-ENTMCNC: 32.3 PG
MCHC RBC-ENTMCNC: 33.6 G/DL
MCV RBC AUTO: 96.2 FL
PLATELET # BLD AUTO: 163 K/UL
PMV BLD: 9.7 FL
RBC # BLD: 3.96 M/UL
RBC # FLD: 11.8 %
WBC # FLD AUTO: 5.66 K/UL

## 2018-09-07 RX ORDER — ACETAMINOPHEN 500 MG
650 TABLET ORAL ONCE
Qty: 0 | Refills: 0 | Status: COMPLETED | OUTPATIENT
Start: 2018-09-07 | End: 2018-09-07

## 2018-09-07 RX ORDER — DIPHENHYDRAMINE HCL 50 MG
25 CAPSULE ORAL ONCE
Qty: 0 | Refills: 0 | Status: COMPLETED | OUTPATIENT
Start: 2018-09-07 | End: 2018-09-07

## 2018-09-07 RX ORDER — TRASTUZUMAB-DKST 420 MG/20ML
425 INJECTION, POWDER, LYOPHILIZED, FOR SOLUTION INTRAVENOUS ONCE
Qty: 0 | Refills: 0 | Status: COMPLETED | OUTPATIENT
Start: 2018-09-07 | End: 2018-09-07

## 2018-09-07 RX ORDER — PERTUZUMAB 30 MG/ML
420 INJECTION, SOLUTION, CONCENTRATE INTRAVENOUS ONCE
Qty: 0 | Refills: 0 | Status: COMPLETED | OUTPATIENT
Start: 2018-09-07 | End: 2018-09-07

## 2018-09-07 RX ADMIN — Medication 151.5 MILLIGRAM(S): at 12:09

## 2018-09-07 RX ADMIN — Medication 650 MILLIGRAM(S): at 12:09

## 2018-09-07 RX ADMIN — PERTUZUMAB 264 MILLIGRAM(S): 30 INJECTION, SOLUTION, CONCENTRATE INTRAVENOUS at 12:49

## 2018-09-07 RX ADMIN — Medication 650 MILLIGRAM(S): at 12:10

## 2018-09-07 RX ADMIN — TRASTUZUMAB-DKST 540.46 MILLIGRAM(S): 420 INJECTION, POWDER, LYOPHILIZED, FOR SOLUTION INTRAVENOUS at 12:49

## 2018-09-10 LAB
ALBUMIN SERPL ELPH-MCNC: 3.8 G/DL
ALP BLD-CCNC: 49 U/L
ALT SERPL-CCNC: 16 U/L
ANION GAP SERPL CALC-SCNC: 13 MMOL/L
AST SERPL-CCNC: 15 U/L
BILIRUB SERPL-MCNC: 0.2 MG/DL
BUN SERPL-MCNC: 23 MG/DL
CALCIUM SERPL-MCNC: 8.9 MG/DL
CHLORIDE SERPL-SCNC: 105 MMOL/L
CO2 SERPL-SCNC: 23 MMOL/L
CREAT SERPL-MCNC: 0.8 MG/DL
GLUCOSE SERPL-MCNC: 81 MG/DL
MAGNESIUM SERPL-MCNC: 1.8 MG/DL
POTASSIUM SERPL-SCNC: 4.2 MMOL/L
PROT SERPL-MCNC: 6.1 G/DL
SODIUM SERPL-SCNC: 141 MMOL/L

## 2018-09-28 ENCOUNTER — APPOINTMENT (OUTPATIENT)
Dept: HEMATOLOGY ONCOLOGY | Facility: CLINIC | Age: 68
End: 2018-09-28

## 2018-09-28 ENCOUNTER — APPOINTMENT (OUTPATIENT)
Dept: INFUSION THERAPY | Facility: CLINIC | Age: 68
End: 2018-09-28

## 2018-09-28 ENCOUNTER — LABORATORY RESULT (OUTPATIENT)
Age: 68
End: 2018-09-28

## 2018-09-28 RX ORDER — PERTUZUMAB 30 MG/ML
420 INJECTION, SOLUTION, CONCENTRATE INTRAVENOUS ONCE
Qty: 0 | Refills: 0 | Status: COMPLETED | OUTPATIENT
Start: 2018-09-28 | End: 2018-09-28

## 2018-09-28 RX ORDER — TRASTUZUMAB-DKST 420 MG/20ML
425 INJECTION, POWDER, LYOPHILIZED, FOR SOLUTION INTRAVENOUS ONCE
Qty: 0 | Refills: 0 | Status: COMPLETED | OUTPATIENT
Start: 2018-09-28 | End: 2018-09-28

## 2018-09-28 RX ORDER — ACETAMINOPHEN 500 MG
650 TABLET ORAL ONCE
Qty: 0 | Refills: 0 | Status: COMPLETED | OUTPATIENT
Start: 2018-09-28 | End: 2018-09-28

## 2018-09-28 RX ORDER — INFLUENZA VIRUS VACCINE 15; 15; 15; 15 UG/.5ML; UG/.5ML; UG/.5ML; UG/.5ML
0.5 SUSPENSION INTRAMUSCULAR ONCE
Qty: 0 | Refills: 0 | Status: COMPLETED | OUTPATIENT
Start: 2018-09-28 | End: 2018-09-28

## 2018-09-28 RX ORDER — DIPHENHYDRAMINE HCL 50 MG
25 CAPSULE ORAL ONCE
Qty: 0 | Refills: 0 | Status: COMPLETED | OUTPATIENT
Start: 2018-09-28 | End: 2018-09-28

## 2018-09-28 RX ADMIN — PERTUZUMAB 264 MILLIGRAM(S): 30 INJECTION, SOLUTION, CONCENTRATE INTRAVENOUS at 11:31

## 2018-09-28 RX ADMIN — TRASTUZUMAB-DKST 540.46 MILLIGRAM(S): 420 INJECTION, POWDER, LYOPHILIZED, FOR SOLUTION INTRAVENOUS at 11:31

## 2018-09-28 RX ADMIN — Medication 650 MILLIGRAM(S): at 10:41

## 2018-09-28 RX ADMIN — Medication 151.5 MILLIGRAM(S): at 10:41

## 2018-09-28 RX ADMIN — INFLUENZA VIRUS VACCINE 0.5 MILLILITER(S): 15; 15; 15; 15 SUSPENSION INTRAMUSCULAR at 10:41

## 2018-09-28 RX ADMIN — Medication 650 MILLIGRAM(S): at 10:45

## 2018-10-02 ENCOUNTER — APPOINTMENT (OUTPATIENT)
Dept: BREAST CENTER | Facility: CLINIC | Age: 68
End: 2018-10-02
Payer: MEDICARE

## 2018-10-02 VITALS
WEIGHT: 161 LBS | DIASTOLIC BLOOD PRESSURE: 82 MMHG | BODY MASS INDEX: 25.27 KG/M2 | HEART RATE: 69 BPM | SYSTOLIC BLOOD PRESSURE: 132 MMHG | OXYGEN SATURATION: 97 % | HEIGHT: 67 IN

## 2018-10-02 PROCEDURE — 99024 POSTOP FOLLOW-UP VISIT: CPT

## 2018-10-08 ENCOUNTER — FORM ENCOUNTER (OUTPATIENT)
Age: 68
End: 2018-10-08

## 2018-10-09 ENCOUNTER — OUTPATIENT (OUTPATIENT)
Dept: OUTPATIENT SERVICES | Facility: HOSPITAL | Age: 68
LOS: 1 days | Discharge: HOME | End: 2018-10-09

## 2018-10-09 DIAGNOSIS — R92.8 OTHER ABNORMAL AND INCONCLUSIVE FINDINGS ON DIAGNOSTIC IMAGING OF BREAST: ICD-10-CM

## 2018-10-09 DIAGNOSIS — Z90.89 ACQUIRED ABSENCE OF OTHER ORGANS: Chronic | ICD-10-CM

## 2018-10-09 DIAGNOSIS — Z87.2 PERSONAL HISTORY OF DISEASES OF THE SKIN AND SUBCUTANEOUS TISSUE: Chronic | ICD-10-CM

## 2018-10-09 DIAGNOSIS — R92.1 MAMMOGRAPHIC CALCIFICATION FOUND ON DIAGNOSTIC IMAGING OF BREAST: Chronic | ICD-10-CM

## 2018-10-19 ENCOUNTER — LABORATORY RESULT (OUTPATIENT)
Age: 68
End: 2018-10-19

## 2018-10-19 ENCOUNTER — APPOINTMENT (OUTPATIENT)
Dept: INFUSION THERAPY | Facility: CLINIC | Age: 68
End: 2018-10-19

## 2018-10-19 ENCOUNTER — APPOINTMENT (OUTPATIENT)
Dept: HEMATOLOGY ONCOLOGY | Facility: CLINIC | Age: 68
End: 2018-10-19

## 2018-10-19 VITALS
HEIGHT: 67 IN | BODY MASS INDEX: 26.21 KG/M2 | SYSTOLIC BLOOD PRESSURE: 145 MMHG | TEMPERATURE: 97.7 F | DIASTOLIC BLOOD PRESSURE: 82 MMHG | HEART RATE: 66 BPM | RESPIRATION RATE: 16 BRPM | WEIGHT: 167 LBS

## 2018-10-19 RX ORDER — DIPHENHYDRAMINE HCL 50 MG
25 CAPSULE ORAL ONCE
Qty: 0 | Refills: 0 | Status: COMPLETED | OUTPATIENT
Start: 2018-10-19 | End: 2018-10-19

## 2018-10-19 RX ORDER — ACETAMINOPHEN 500 MG
650 TABLET ORAL ONCE
Qty: 0 | Refills: 0 | Status: COMPLETED | OUTPATIENT
Start: 2018-10-19 | End: 2018-10-19

## 2018-10-19 RX ORDER — PERTUZUMAB 30 MG/ML
420 INJECTION, SOLUTION, CONCENTRATE INTRAVENOUS ONCE
Qty: 0 | Refills: 0 | Status: COMPLETED | OUTPATIENT
Start: 2018-10-19 | End: 2018-10-19

## 2018-10-19 RX ORDER — TRASTUZUMAB-DKST 420 MG/20ML
425 INJECTION, POWDER, LYOPHILIZED, FOR SOLUTION INTRAVENOUS ONCE
Qty: 0 | Refills: 0 | Status: COMPLETED | OUTPATIENT
Start: 2018-10-19 | End: 2018-10-19

## 2018-10-19 RX ADMIN — PERTUZUMAB 264 MILLIGRAM(S): 30 INJECTION, SOLUTION, CONCENTRATE INTRAVENOUS at 12:03

## 2018-10-19 RX ADMIN — TRASTUZUMAB-DKST 540.46 MILLIGRAM(S): 420 INJECTION, POWDER, LYOPHILIZED, FOR SOLUTION INTRAVENOUS at 12:02

## 2018-10-19 RX ADMIN — Medication 25 MILLIGRAM(S): at 12:04

## 2018-10-19 RX ADMIN — Medication 650 MILLIGRAM(S): at 12:02

## 2018-11-09 ENCOUNTER — APPOINTMENT (OUTPATIENT)
Dept: INFUSION THERAPY | Facility: CLINIC | Age: 68
End: 2018-11-09

## 2018-11-09 ENCOUNTER — APPOINTMENT (OUTPATIENT)
Dept: HEMATOLOGY ONCOLOGY | Facility: CLINIC | Age: 68
End: 2018-11-09

## 2018-11-09 ENCOUNTER — LABORATORY RESULT (OUTPATIENT)
Age: 68
End: 2018-11-09

## 2018-11-09 RX ORDER — ACETAMINOPHEN 500 MG
650 TABLET ORAL ONCE
Qty: 0 | Refills: 0 | Status: COMPLETED | OUTPATIENT
Start: 2018-11-09 | End: 2018-11-09

## 2018-11-09 RX ORDER — DIPHENHYDRAMINE HCL 50 MG
25 CAPSULE ORAL ONCE
Qty: 0 | Refills: 0 | Status: COMPLETED | OUTPATIENT
Start: 2018-11-09 | End: 2018-11-09

## 2018-11-09 RX ORDER — PERTUZUMAB 30 MG/ML
420 INJECTION, SOLUTION, CONCENTRATE INTRAVENOUS ONCE
Qty: 0 | Refills: 0 | Status: COMPLETED | OUTPATIENT
Start: 2018-11-09 | End: 2018-11-09

## 2018-11-09 RX ORDER — TRASTUZUMAB-DKST 420 MG/20ML
425 INJECTION, POWDER, LYOPHILIZED, FOR SOLUTION INTRAVENOUS ONCE
Qty: 0 | Refills: 0 | Status: COMPLETED | OUTPATIENT
Start: 2018-11-09 | End: 2018-11-09

## 2018-11-09 RX ADMIN — PERTUZUMAB 264 MILLIGRAM(S): 30 INJECTION, SOLUTION, CONCENTRATE INTRAVENOUS at 11:00

## 2018-11-09 RX ADMIN — Medication 25 MILLIGRAM(S): at 10:37

## 2018-11-09 RX ADMIN — TRASTUZUMAB-DKST 540.46 MILLIGRAM(S): 420 INJECTION, POWDER, LYOPHILIZED, FOR SOLUTION INTRAVENOUS at 10:59

## 2018-11-09 RX ADMIN — Medication 650 MILLIGRAM(S): at 10:37

## 2018-11-14 ENCOUNTER — OUTPATIENT (OUTPATIENT)
Dept: OUTPATIENT SERVICES | Facility: HOSPITAL | Age: 68
LOS: 1 days | Discharge: HOME | End: 2018-11-14

## 2018-11-14 DIAGNOSIS — C50.919 MALIGNANT NEOPLASM OF UNSPECIFIED SITE OF UNSPECIFIED FEMALE BREAST: ICD-10-CM

## 2018-11-14 DIAGNOSIS — Z87.2 PERSONAL HISTORY OF DISEASES OF THE SKIN AND SUBCUTANEOUS TISSUE: Chronic | ICD-10-CM

## 2018-11-14 DIAGNOSIS — R92.1 MAMMOGRAPHIC CALCIFICATION FOUND ON DIAGNOSTIC IMAGING OF BREAST: Chronic | ICD-10-CM

## 2018-11-14 DIAGNOSIS — Z90.89 ACQUIRED ABSENCE OF OTHER ORGANS: Chronic | ICD-10-CM

## 2018-11-29 ENCOUNTER — OUTPATIENT (OUTPATIENT)
Dept: OUTPATIENT SERVICES | Facility: HOSPITAL | Age: 68
LOS: 1 days | Discharge: HOME | End: 2018-11-29

## 2018-11-29 DIAGNOSIS — Z90.89 ACQUIRED ABSENCE OF OTHER ORGANS: Chronic | ICD-10-CM

## 2018-11-29 DIAGNOSIS — R92.1 MAMMOGRAPHIC CALCIFICATION FOUND ON DIAGNOSTIC IMAGING OF BREAST: Chronic | ICD-10-CM

## 2018-11-29 DIAGNOSIS — C50.412 MALIGNANT NEOPLASM OF UPPER-OUTER QUADRANT OF LEFT FEMALE BREAST: ICD-10-CM

## 2018-11-29 DIAGNOSIS — Z87.2 PERSONAL HISTORY OF DISEASES OF THE SKIN AND SUBCUTANEOUS TISSUE: Chronic | ICD-10-CM

## 2018-11-30 ENCOUNTER — LABORATORY RESULT (OUTPATIENT)
Age: 68
End: 2018-11-30

## 2018-11-30 ENCOUNTER — OUTPATIENT (OUTPATIENT)
Dept: OUTPATIENT SERVICES | Facility: HOSPITAL | Age: 68
LOS: 1 days | Discharge: HOME | End: 2018-11-30

## 2018-11-30 ENCOUNTER — APPOINTMENT (OUTPATIENT)
Dept: HEMATOLOGY ONCOLOGY | Facility: CLINIC | Age: 68
End: 2018-11-30

## 2018-11-30 ENCOUNTER — APPOINTMENT (OUTPATIENT)
Dept: INFUSION THERAPY | Facility: CLINIC | Age: 68
End: 2018-11-30

## 2018-11-30 DIAGNOSIS — C50.812 MALIGNANT NEOPLASM OF OVERLAPPING SITES OF LEFT FEMALE BREAST: ICD-10-CM

## 2018-11-30 DIAGNOSIS — R92.1 MAMMOGRAPHIC CALCIFICATION FOUND ON DIAGNOSTIC IMAGING OF BREAST: Chronic | ICD-10-CM

## 2018-11-30 DIAGNOSIS — C50.412 MALIGNANT NEOPLASM OF UPPER-OUTER QUADRANT OF LEFT FEMALE BREAST: ICD-10-CM

## 2018-11-30 DIAGNOSIS — Z87.2 PERSONAL HISTORY OF DISEASES OF THE SKIN AND SUBCUTANEOUS TISSUE: Chronic | ICD-10-CM

## 2018-11-30 DIAGNOSIS — Z90.89 ACQUIRED ABSENCE OF OTHER ORGANS: Chronic | ICD-10-CM

## 2018-11-30 DIAGNOSIS — Z23 ENCOUNTER FOR IMMUNIZATION: ICD-10-CM

## 2018-11-30 DIAGNOSIS — Z51.11 ENCOUNTER FOR ANTINEOPLASTIC CHEMOTHERAPY: ICD-10-CM

## 2018-11-30 RX ORDER — PERTUZUMAB 30 MG/ML
420 INJECTION, SOLUTION, CONCENTRATE INTRAVENOUS ONCE
Qty: 0 | Refills: 0 | Status: COMPLETED | OUTPATIENT
Start: 2018-11-30 | End: 2018-11-30

## 2018-11-30 RX ORDER — ACETAMINOPHEN 500 MG
650 TABLET ORAL ONCE
Qty: 0 | Refills: 0 | Status: COMPLETED | OUTPATIENT
Start: 2018-11-30 | End: 2018-11-30

## 2018-11-30 RX ORDER — TRASTUZUMAB-DKST 420 MG/20ML
425 INJECTION, POWDER, LYOPHILIZED, FOR SOLUTION INTRAVENOUS ONCE
Qty: 0 | Refills: 0 | Status: COMPLETED | OUTPATIENT
Start: 2018-11-30 | End: 2018-11-30

## 2018-11-30 RX ORDER — DIPHENHYDRAMINE HCL 50 MG
25 CAPSULE ORAL ONCE
Qty: 0 | Refills: 0 | Status: COMPLETED | OUTPATIENT
Start: 2018-11-30 | End: 2018-11-30

## 2018-11-30 RX ADMIN — Medication 650 MILLIGRAM(S): at 13:19

## 2018-11-30 RX ADMIN — TRASTUZUMAB-DKST 540.46 MILLIGRAM(S): 420 INJECTION, POWDER, LYOPHILIZED, FOR SOLUTION INTRAVENOUS at 14:28

## 2018-11-30 RX ADMIN — PERTUZUMAB 264 MILLIGRAM(S): 30 INJECTION, SOLUTION, CONCENTRATE INTRAVENOUS at 14:29

## 2018-11-30 RX ADMIN — Medication 650 MILLIGRAM(S): at 13:18

## 2018-11-30 RX ADMIN — Medication 25 MILLIGRAM(S): at 13:18

## 2018-12-28 ENCOUNTER — APPOINTMENT (OUTPATIENT)
Dept: HEMATOLOGY ONCOLOGY | Facility: CLINIC | Age: 68
End: 2018-12-28

## 2018-12-28 ENCOUNTER — APPOINTMENT (OUTPATIENT)
Dept: INFUSION THERAPY | Facility: CLINIC | Age: 68
End: 2018-12-28

## 2018-12-28 ENCOUNTER — LABORATORY RESULT (OUTPATIENT)
Age: 68
End: 2018-12-28

## 2018-12-28 VITALS — BODY MASS INDEX: 26 KG/M2 | WEIGHT: 166 LBS

## 2018-12-28 VITALS
HEIGHT: 67 IN | SYSTOLIC BLOOD PRESSURE: 151 MMHG | TEMPERATURE: 97 F | HEART RATE: 62 BPM | DIASTOLIC BLOOD PRESSURE: 78 MMHG | RESPIRATION RATE: 14 BRPM

## 2018-12-28 LAB
HCT VFR BLD CALC: 37.3 %
HGB BLD-MCNC: 12.5 G/DL
MCHC RBC-ENTMCNC: 30.9 PG
MCHC RBC-ENTMCNC: 33.5 G/DL
MCV RBC AUTO: 92.1 FL
PLATELET # BLD AUTO: 179 K/UL
PMV BLD: 10.4 FL
RBC # BLD: 4.05 M/UL
RBC # FLD: 12.7 %
T4 FREE SERPL-MCNC: 1.3 NG/DL
TSH SERPL-ACNC: 1.11 UIU/ML
WBC # FLD AUTO: 5.68 K/UL

## 2018-12-28 RX ORDER — TRASTUZUMAB-DKST 420 MG/20ML
450 INJECTION, POWDER, LYOPHILIZED, FOR SOLUTION INTRAVENOUS ONCE
Qty: 0 | Refills: 0 | Status: COMPLETED | OUTPATIENT
Start: 2018-12-28 | End: 2018-12-28

## 2018-12-28 RX ORDER — PERTUZUMAB 30 MG/ML
420 INJECTION, SOLUTION, CONCENTRATE INTRAVENOUS ONCE
Qty: 0 | Refills: 0 | Status: COMPLETED | OUTPATIENT
Start: 2018-12-28 | End: 2018-12-28

## 2018-12-28 RX ORDER — ACETAMINOPHEN 500 MG
650 TABLET ORAL ONCE
Qty: 0 | Refills: 0 | Status: COMPLETED | OUTPATIENT
Start: 2018-12-28 | End: 2018-12-28

## 2018-12-28 RX ORDER — DIPHENHYDRAMINE HCL 50 MG
25 CAPSULE ORAL ONCE
Qty: 0 | Refills: 0 | Status: COMPLETED | OUTPATIENT
Start: 2018-12-28 | End: 2018-12-28

## 2018-12-28 RX ADMIN — PERTUZUMAB 264 MILLIGRAM(S): 30 INJECTION, SOLUTION, CONCENTRATE INTRAVENOUS at 11:58

## 2018-12-28 RX ADMIN — Medication 650 MILLIGRAM(S): at 12:00

## 2018-12-28 RX ADMIN — TRASTUZUMAB-DKST 542.84 MILLIGRAM(S): 420 INJECTION, POWDER, LYOPHILIZED, FOR SOLUTION INTRAVENOUS at 11:59

## 2018-12-28 RX ADMIN — Medication 650 MILLIGRAM(S): at 11:58

## 2018-12-28 RX ADMIN — Medication 25 MILLIGRAM(S): at 11:58

## 2019-01-01 ENCOUNTER — FORM ENCOUNTER (OUTPATIENT)
Age: 69
End: 2019-01-01

## 2019-01-02 ENCOUNTER — OUTPATIENT (OUTPATIENT)
Dept: OUTPATIENT SERVICES | Facility: HOSPITAL | Age: 69
LOS: 1 days | Discharge: HOME | End: 2019-01-02

## 2019-01-02 DIAGNOSIS — R92.8 OTHER ABNORMAL AND INCONCLUSIVE FINDINGS ON DIAGNOSTIC IMAGING OF BREAST: ICD-10-CM

## 2019-01-02 DIAGNOSIS — Z90.89 ACQUIRED ABSENCE OF OTHER ORGANS: Chronic | ICD-10-CM

## 2019-01-02 DIAGNOSIS — Z87.2 PERSONAL HISTORY OF DISEASES OF THE SKIN AND SUBCUTANEOUS TISSUE: Chronic | ICD-10-CM

## 2019-01-02 DIAGNOSIS — R92.1 MAMMOGRAPHIC CALCIFICATION FOUND ON DIAGNOSTIC IMAGING OF BREAST: Chronic | ICD-10-CM

## 2019-01-08 ENCOUNTER — APPOINTMENT (OUTPATIENT)
Dept: BREAST CENTER | Facility: CLINIC | Age: 69
End: 2019-01-08
Payer: MEDICARE

## 2019-01-08 VITALS
DIASTOLIC BLOOD PRESSURE: 82 MMHG | BODY MASS INDEX: 25.75 KG/M2 | HEART RATE: 67 BPM | WEIGHT: 166 LBS | HEIGHT: 67.5 IN | SYSTOLIC BLOOD PRESSURE: 126 MMHG | OXYGEN SATURATION: 96 %

## 2019-01-08 PROCEDURE — 99212 OFFICE O/P EST SF 10 MIN: CPT

## 2019-01-11 ENCOUNTER — APPOINTMENT (OUTPATIENT)
Dept: INFUSION THERAPY | Facility: CLINIC | Age: 69
End: 2019-01-11

## 2019-01-11 LAB
ANION GAP SERPL CALC-SCNC: 15 MMOL/L
BUN SERPL-MCNC: 21 MG/DL
CALCIUM SERPL-MCNC: 9.4 MG/DL
CANCER AG15-3 SERPL-ACNC: 22.7 U/ML
CEA SERPL-MCNC: 2 NG/ML
CHLORIDE SERPL-SCNC: 106 MMOL/L
CO2 SERPL-SCNC: 24 MMOL/L
CREAT SERPL-MCNC: 0.7 MG/DL
GLUCOSE SERPL-MCNC: 86 MG/DL
HCT VFR BLD CALC: 36.3 %
HCT VFR BLD CALC: 36.5 %
HCT VFR BLD CALC: 37 %
HCT VFR BLD CALC: 37.1 %
HGB BLD-MCNC: 12.4 G/DL
HGB BLD-MCNC: 12.4 G/DL
HGB BLD-MCNC: 12.5 G/DL
HGB BLD-MCNC: 12.6 G/DL
MAGNESIUM SERPL-MCNC: 2 MG/DL
MCHC RBC-ENTMCNC: 31.2 PG
MCHC RBC-ENTMCNC: 31.3 PG
MCHC RBC-ENTMCNC: 31.8 PG
MCHC RBC-ENTMCNC: 31.8 PG
MCHC RBC-ENTMCNC: 33.8 G/DL
MCHC RBC-ENTMCNC: 34 G/DL
MCHC RBC-ENTMCNC: 34 G/DL
MCHC RBC-ENTMCNC: 34.2 G/DL
MCV RBC AUTO: 91.7 FL
MCV RBC AUTO: 92.1 FL
MCV RBC AUTO: 93.1 FL
MCV RBC AUTO: 94.1 FL
PLATELET # BLD AUTO: 145 K/UL
PLATELET # BLD AUTO: 147 K/UL
PLATELET # BLD AUTO: 150 K/UL
PLATELET # BLD AUTO: 165 K/UL
PMV BLD: 10.3 FL
PMV BLD: 10.3 FL
PMV BLD: 10.4 FL
PMV BLD: 9.7 FL
POTASSIUM SERPL-SCNC: 4 MMOL/L
RBC # BLD: 3.9 M/UL
RBC # BLD: 3.93 M/UL
RBC # BLD: 3.98 M/UL
RBC # BLD: 4.03 M/UL
RBC # FLD: 12.1 %
RBC # FLD: 12.2 %
RBC # FLD: 12.7 %
RBC # FLD: 12.9 %
SODIUM SERPL-SCNC: 145 MMOL/L
WBC # FLD AUTO: 4.78 K/UL
WBC # FLD AUTO: 4.82 K/UL
WBC # FLD AUTO: 5.56 K/UL
WBC # FLD AUTO: 5.74 K/UL

## 2019-01-18 ENCOUNTER — APPOINTMENT (OUTPATIENT)
Dept: INFUSION THERAPY | Facility: CLINIC | Age: 69
End: 2019-01-18

## 2019-01-18 RX ORDER — ACETAMINOPHEN 500 MG
650 TABLET ORAL ONCE
Qty: 0 | Refills: 0 | Status: COMPLETED | OUTPATIENT
Start: 2019-01-18 | End: 2019-01-18

## 2019-01-18 RX ORDER — TRASTUZUMAB-DKST 420 MG/20ML
450 INJECTION, POWDER, LYOPHILIZED, FOR SOLUTION INTRAVENOUS ONCE
Qty: 0 | Refills: 0 | Status: COMPLETED | OUTPATIENT
Start: 2019-01-18 | End: 2019-01-18

## 2019-01-18 RX ORDER — PERTUZUMAB 30 MG/ML
420 INJECTION, SOLUTION, CONCENTRATE INTRAVENOUS ONCE
Qty: 0 | Refills: 0 | Status: COMPLETED | OUTPATIENT
Start: 2019-01-18 | End: 2019-01-18

## 2019-01-18 RX ORDER — DIPHENHYDRAMINE HCL 50 MG
25 CAPSULE ORAL ONCE
Qty: 0 | Refills: 0 | Status: COMPLETED | OUTPATIENT
Start: 2019-01-18 | End: 2019-01-18

## 2019-01-18 RX ADMIN — Medication 650 MILLIGRAM(S): at 10:13

## 2019-01-18 RX ADMIN — Medication 25 MILLIGRAM(S): at 10:12

## 2019-01-18 RX ADMIN — PERTUZUMAB 264 MILLIGRAM(S): 30 INJECTION, SOLUTION, CONCENTRATE INTRAVENOUS at 10:41

## 2019-01-18 RX ADMIN — TRASTUZUMAB-DKST 542.84 MILLIGRAM(S): 420 INJECTION, POWDER, LYOPHILIZED, FOR SOLUTION INTRAVENOUS at 10:41

## 2019-02-08 ENCOUNTER — APPOINTMENT (OUTPATIENT)
Dept: HEMATOLOGY ONCOLOGY | Facility: CLINIC | Age: 69
End: 2019-02-08

## 2019-02-08 ENCOUNTER — APPOINTMENT (OUTPATIENT)
Dept: INFUSION THERAPY | Facility: CLINIC | Age: 69
End: 2019-02-08

## 2019-02-08 ENCOUNTER — LABORATORY RESULT (OUTPATIENT)
Age: 69
End: 2019-02-08

## 2019-02-08 VITALS
RESPIRATION RATE: 14 BRPM | SYSTOLIC BLOOD PRESSURE: 137 MMHG | WEIGHT: 166 LBS | HEIGHT: 67.5 IN | DIASTOLIC BLOOD PRESSURE: 80 MMHG | TEMPERATURE: 97.8 F | HEART RATE: 74 BPM | BODY MASS INDEX: 25.75 KG/M2

## 2019-02-08 DIAGNOSIS — Z01.818 ENCOUNTER FOR OTHER PREPROCEDURAL EXAMINATION: ICD-10-CM

## 2019-02-08 LAB
HCT VFR BLD CALC: 39.3 %
HGB BLD-MCNC: 13.3 G/DL
MCHC RBC-ENTMCNC: 30.9 PG
MCHC RBC-ENTMCNC: 33.8 G/DL
MCV RBC AUTO: 91.4 FL
PLATELET # BLD AUTO: 203 K/UL
PMV BLD: 10.4 FL
RBC # BLD: 4.3 M/UL
RBC # FLD: 12.7 %
WBC # FLD AUTO: 6.13 K/UL

## 2019-02-08 RX ORDER — ACETAMINOPHEN 500 MG
650 TABLET ORAL ONCE
Qty: 0 | Refills: 0 | Status: COMPLETED | OUTPATIENT
Start: 2019-02-08 | End: 2019-02-08

## 2019-02-08 RX ORDER — PERTUZUMAB 30 MG/ML
420 INJECTION, SOLUTION, CONCENTRATE INTRAVENOUS ONCE
Qty: 0 | Refills: 0 | Status: COMPLETED | OUTPATIENT
Start: 2019-02-08 | End: 2019-02-08

## 2019-02-08 RX ORDER — DIPHENHYDRAMINE HCL 50 MG
25 CAPSULE ORAL ONCE
Qty: 0 | Refills: 0 | Status: COMPLETED | OUTPATIENT
Start: 2019-02-08 | End: 2019-02-08

## 2019-02-08 RX ORDER — TRASTUZUMAB-DKST 420 MG/20ML
450 INJECTION, POWDER, LYOPHILIZED, FOR SOLUTION INTRAVENOUS ONCE
Qty: 0 | Refills: 0 | Status: COMPLETED | OUTPATIENT
Start: 2019-02-08 | End: 2019-02-08

## 2019-02-08 RX ADMIN — TRASTUZUMAB-DKST 542.84 MILLIGRAM(S): 420 INJECTION, POWDER, LYOPHILIZED, FOR SOLUTION INTRAVENOUS at 13:07

## 2019-02-08 RX ADMIN — PERTUZUMAB 264 MILLIGRAM(S): 30 INJECTION, SOLUTION, CONCENTRATE INTRAVENOUS at 13:07

## 2019-02-08 RX ADMIN — Medication 650 MILLIGRAM(S): at 12:52

## 2019-02-08 RX ADMIN — Medication 25 MILLIGRAM(S): at 12:52

## 2019-02-08 NOTE — HISTORY OF PRESENT ILLNESS
[de-identified] : 66 yo female is here for f/u visit and chemotherapy. She was recently diagnosed left breast cancer. She had screening mammogram in 06/2015 and it was normal. In Jan 2018 she noticed a lump in left breast. A diagnostic mammo on 1/18/2018 showed a  2.1  cm  mass In  the  region  of  patient's  palpable  abnormality.  No  other  suspicious  masses,  areas of  architectural  distortion  or  cluster  of  microcalcifications  in  either  breast.  Evaluation  of  the  left  axilla  demonstrates  normal-appearing  lymph  nodes.\par   \par Targeted  Left  Breast  Sonogram showed  \par In  the  region  of  the  patient's  palpable  abnormality  at  the  12:00  location  3  cm  from  the  nipple,  there  is  an  irregular  hypoechoic  mass  measuring  5.2  cm  x  3.2  cm  x  1.2  cm  which  corresponds  to  mammographic  findings. \par Other  masses  also  identified  within  the  left  breast  and  are  as  follows: 6:00  location  2  cm  from  the  nipple  measuring  4.1  cm  x  2.4  cm  x  1.4  cm,  9-10  o'clock  location  4  cm  from  the  nipple  measuring  2.1  cm  1.4  cm  x  0.4  cm  and  11:00  location  5  to  6  cm  from  the  nipple  measuring  1.3  cm  x  2.1  cm  x  0.4  cm.   An  ultrasound-guided  core  biopsy  of  the  each  mass  is  recommended\par \par She then had USG guided biopsies of those lesions. Pathology showed \par Site  A:  Left  breast,  11:00  N5-6. \par Intraductal  papilloma  containing  florid  duct  hyperplasia. \par   \par Site  B:  Left  breast  9-10  o'clock  N4. \par -Complex  sclerosing  lesion  (radial  scar)  containing  focal  florid  duct  hyperplasia.  \par -Atrophic  breast  tissue  associated  with  coarse  stromal  calcifications. \par  Site  C:  Left  Breast  6:00  N2 \par -Benign  breast  tissue  with  proliferative  type  fibrocystic  changes  including  dominant  dense  stromal  fibrosis,  focal  florid  duct  hyperplasia,  sclerosing  adenosis,  macrocyst  wall  fragments,  columnar  cell  change/hyperplasia  without  atypia,  and  rare  microcalcifications  present  in  benign  ducts. \par   \par Site  D:  Left  breast  12:00  N3 \par -Invasive  poorly  differentiated  duct  carcinoma  with  focal  necrosis, ER negative, TN <1%, Her-2 positive 3+ by IHC and Ki 67 50%.\par   \par Recommendation:  Surgical  excision.  Surgical  and  oncologic  follow-up  for  the  biopsy-proven  12:00  carcinoma  is  recommended.    Surgical  planning  for  excision  of  the  11:00  and  9-10  o'clock  high  risk  lesions  also  recommended\par \par On 2/13/2018, PET/CT  showed abnormal increased uptake is seen in multiple areas in the left breast. Max SUV 13.92 in the superior breast mass near surgical clip findings are \par consistent with biopsy proven malignancy in the superior mass. Remaining of the abnormal areas of increased uptake could be malignancy and/or postinflammatory nature. Correlate the findings with the biopsy reports.  There are PET positive left axillary lymph nodes with a max SUV 2.56. Findings could be postinflammatory and/or malignancy. \par No other areas of abnormal increased uptake is seen to suggest biologic tumor activity. \par \par On 2/15/18,  MRI breast was done. the report is still pending.\par \par The patient saw Dr. Hampton for surgical consultation. In light of large Her-2 positive tumor, she was given an option to have neoadjuvant chemotherapy followed by surgery. Monica is here with her sister for consultation of neoadjuvant systemic therapy. [de-identified] : Monica has completed neoadjuvant chemotherapy with TCHP x 6 times.  She responded well and her left breast mass has reduced significantly. She complains fatigue, \par She experienced side effects from the chemotherapy including diarrhea, poor appetite and insomnia. These symptoms have got better since chemo completed.\par \par On 7/27/18, she had PET/CT which showed No pathologic FDG uptake to suggest definite biologic tumor activity. All previously seen pathologic FDG uptake in the left breast has resolved. Previously seen mild FDG uptake in left axillary nodes has resolved. Findings are consistent with good treatment response. Mildly FDG avid portacaval lymph node, SUV max 5.7, is nonspecific but less likely to represent biologic tumor activity. This can be reassessed on follow-up. \par \par On 7/23/18, she had b/l breast MRI which showed Interval decrease in size of left breast 12:00 axis biopsy-proven invasive ductal carcinoma; currently it measures up to 1.0 cm, previously measuring up to 3.2 cm. \par \par 9/7/18:\par The patient is here today for followup and treatment. She underwent NLOC left lumpectomy and SLNB on 8/24/18. The pathology showed no evidence of residual disease and 2 SLN negative. She recovered well from surgery.\par She still has loose bowel movement sometimes. her appetite is improving.\par \par 10/19/2018\par Patient is here for a follow up prior to getting treatment with Herceptin and Perjeta. Her lumpectomy scars have healed up well and she has some mild tenderness. It assured that it will get better. She was concerned about irregularity at incision site and again we discussed the mammography findings which showed post lumpectomy changes ( 10/2018) and that it is scar tissue. \par She had mapping done for radiation, yet to start though. \par Her loose bowel movements have improved as well. \par She offers no other complaints.\par \par 12/28/18:\par The patient is here for followup visit. She completed radiation in 11/2018. She feels well and does not have new complains. In 10/2018, she had b/l dx mammo. There was no suspicious finding. In 11/2018, she had 2D echo.

## 2019-02-08 NOTE — ASSESSMENT
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 \par -- Left breast dx mammo in 3/2019.\par -- Followup with PCP for health maintenance.\par -- RTO for followup visit in 6 weeks.\par \par Patient seen and examined with  who agreed with the above.

## 2019-02-08 NOTE — PHYSICAL EXAM
[Restricted in physically strenuous activity but ambulatory and able to carry out work of a light or sedentary nature] : Status 1- Restricted in physically strenuous activity but ambulatory and able to carry out work of a light or sedentary nature, e.g., light house work, office work [Normal] : affect appropriate [de-identified] : Left breast mastectomy surgical incision healed up well. Mild tenderness. left SLNB scar healed well.

## 2019-02-09 PROBLEM — Z01.818 EXAMINATION PRIOR TO CHEMOTHERAPY: Status: ACTIVE | Noted: 2018-02-16

## 2019-03-01 ENCOUNTER — LABORATORY RESULT (OUTPATIENT)
Age: 69
End: 2019-03-01

## 2019-03-01 ENCOUNTER — APPOINTMENT (OUTPATIENT)
Dept: INFUSION THERAPY | Facility: CLINIC | Age: 69
End: 2019-03-01

## 2019-03-01 DIAGNOSIS — Z00.00 ENCOUNTER FOR GENERAL ADULT MEDICAL EXAMINATION W/OUT ABNORMAL FINDINGS: ICD-10-CM

## 2019-03-01 RX ORDER — ACETAMINOPHEN 500 MG
650 TABLET ORAL ONCE
Qty: 0 | Refills: 0 | Status: COMPLETED | OUTPATIENT
Start: 2019-03-01 | End: 2019-03-01

## 2019-03-01 RX ORDER — TRASTUZUMAB-DKST 420 MG/20ML
450 INJECTION, POWDER, LYOPHILIZED, FOR SOLUTION INTRAVENOUS ONCE
Qty: 0 | Refills: 0 | Status: COMPLETED | OUTPATIENT
Start: 2019-03-01 | End: 2019-03-01

## 2019-03-01 RX ORDER — PERTUZUMAB 30 MG/ML
420 INJECTION, SOLUTION, CONCENTRATE INTRAVENOUS ONCE
Qty: 0 | Refills: 0 | Status: COMPLETED | OUTPATIENT
Start: 2019-03-01 | End: 2019-03-01

## 2019-03-01 RX ORDER — DIPHENHYDRAMINE HCL 50 MG
25 CAPSULE ORAL ONCE
Qty: 0 | Refills: 0 | Status: COMPLETED | OUTPATIENT
Start: 2019-03-01 | End: 2019-03-01

## 2019-03-01 RX ADMIN — TRASTUZUMAB-DKST 450 MILLIGRAM(S): 420 INJECTION, POWDER, LYOPHILIZED, FOR SOLUTION INTRAVENOUS at 13:50

## 2019-03-01 RX ADMIN — PERTUZUMAB 264 MILLIGRAM(S): 30 INJECTION, SOLUTION, CONCENTRATE INTRAVENOUS at 12:20

## 2019-03-01 RX ADMIN — Medication 650 MILLIGRAM(S): at 11:50

## 2019-03-01 RX ADMIN — Medication 25 MILLIGRAM(S): at 11:50

## 2019-03-01 RX ADMIN — TRASTUZUMAB-DKST 542.84 MILLIGRAM(S): 420 INJECTION, POWDER, LYOPHILIZED, FOR SOLUTION INTRAVENOUS at 13:20

## 2019-03-01 RX ADMIN — PERTUZUMAB 420 MILLIGRAM(S): 30 INJECTION, SOLUTION, CONCENTRATE INTRAVENOUS at 12:50

## 2019-03-07 ENCOUNTER — OUTPATIENT (OUTPATIENT)
Dept: OUTPATIENT SERVICES | Facility: HOSPITAL | Age: 69
LOS: 1 days | Discharge: HOME | End: 2019-03-07

## 2019-03-07 DIAGNOSIS — Z87.2 PERSONAL HISTORY OF DISEASES OF THE SKIN AND SUBCUTANEOUS TISSUE: Chronic | ICD-10-CM

## 2019-03-07 DIAGNOSIS — Z90.89 ACQUIRED ABSENCE OF OTHER ORGANS: Chronic | ICD-10-CM

## 2019-03-07 DIAGNOSIS — M79.643 PAIN IN UNSPECIFIED HAND: ICD-10-CM

## 2019-03-07 DIAGNOSIS — R92.1 MAMMOGRAPHIC CALCIFICATION FOUND ON DIAGNOSTIC IMAGING OF BREAST: Chronic | ICD-10-CM

## 2019-04-02 ENCOUNTER — FORM ENCOUNTER (OUTPATIENT)
Age: 69
End: 2019-04-02

## 2019-04-03 ENCOUNTER — OUTPATIENT (OUTPATIENT)
Dept: OUTPATIENT SERVICES | Facility: HOSPITAL | Age: 69
LOS: 1 days | Discharge: HOME | End: 2019-04-03
Payer: MEDICARE

## 2019-04-03 DIAGNOSIS — Z87.2 PERSONAL HISTORY OF DISEASES OF THE SKIN AND SUBCUTANEOUS TISSUE: Chronic | ICD-10-CM

## 2019-04-03 DIAGNOSIS — R92.8 OTHER ABNORMAL AND INCONCLUSIVE FINDINGS ON DIAGNOSTIC IMAGING OF BREAST: ICD-10-CM

## 2019-04-03 DIAGNOSIS — Z90.89 ACQUIRED ABSENCE OF OTHER ORGANS: Chronic | ICD-10-CM

## 2019-04-03 DIAGNOSIS — R92.1 MAMMOGRAPHIC CALCIFICATION FOUND ON DIAGNOSTIC IMAGING OF BREAST: Chronic | ICD-10-CM

## 2019-04-03 DIAGNOSIS — Z85.3 PERSONAL HISTORY OF MALIGNANT NEOPLASM OF BREAST: ICD-10-CM

## 2019-04-03 PROCEDURE — 76642 ULTRASOUND BREAST LIMITED: CPT | Mod: 26,50

## 2019-04-03 PROCEDURE — 77065 DX MAMMO INCL CAD UNI: CPT | Mod: 26,LT

## 2019-04-03 PROCEDURE — G0279: CPT | Mod: 26,LT

## 2019-04-10 ENCOUNTER — OUTPATIENT (OUTPATIENT)
Dept: OUTPATIENT SERVICES | Facility: HOSPITAL | Age: 69
LOS: 1 days | Discharge: HOME | End: 2019-04-10
Payer: MEDICARE

## 2019-04-10 DIAGNOSIS — R92.1 MAMMOGRAPHIC CALCIFICATION FOUND ON DIAGNOSTIC IMAGING OF BREAST: Chronic | ICD-10-CM

## 2019-04-10 DIAGNOSIS — R10.819 ABDOMINAL TENDERNESS, UNSPECIFIED SITE: ICD-10-CM

## 2019-04-10 DIAGNOSIS — Z90.89 ACQUIRED ABSENCE OF OTHER ORGANS: Chronic | ICD-10-CM

## 2019-04-10 DIAGNOSIS — Z87.2 PERSONAL HISTORY OF DISEASES OF THE SKIN AND SUBCUTANEOUS TISSUE: Chronic | ICD-10-CM

## 2019-04-10 PROCEDURE — 76700 US EXAM ABDOM COMPLETE: CPT | Mod: 26

## 2019-04-11 ENCOUNTER — APPOINTMENT (OUTPATIENT)
Dept: BREAST CENTER | Facility: CLINIC | Age: 69
End: 2019-04-11
Payer: MEDICARE

## 2019-04-11 VITALS — BODY MASS INDEX: 25.43 KG/M2 | TEMPERATURE: 98.8 F | WEIGHT: 162 LBS | HEIGHT: 67 IN

## 2019-04-11 PROCEDURE — 99213 OFFICE O/P EST LOW 20 MIN: CPT

## 2019-04-11 NOTE — PHYSICAL EXAM
[Normocephalic] : normocephalic [Atraumatic] : atraumatic [No dominant masses] : no dominant masses in right breast  [No dominant masses] : no dominant masses left breast [No Nipple Discharge] : no left nipple discharge [Breast Nipple Inversion] : nipples not inverted [Breast Nipple Retraction] : nipples not retracted [No Rashes] : no rashes [No Ulceration] : no ulceration [de-identified] : well healed surgical scars.\par palpable dense scar tissue. [de-identified] : No axillary lymphadenopathy appreciated. [de-identified] : No axillary lymphadenopathy appreciated.

## 2019-04-11 NOTE — ASSESSMENT
[FreeTextEntry1] : ALEK is a akbar 68 year old patient who presented today in follow up for a history of left breast cancer.  She has been doing well with no new breast related complaints, just some mild discomfort in the left breast. \par Imaging was done recently which revealed probably benign calcifications in the left breast and probably benign bilateral breast masses, as detailed above. \par Physical exam was unrevealing today.\par \par Imaging with a bilateral diagnostic mammogram and sonogram will be due in September 2019, and that will be scheduled today. \par All questions answered. She knows to call with any concerns. \par She will return for follow-up and clinical breast exam in six months with Dr. Hampton.\par She will continue follow-up with GI regarding new onset abdominal pain - they have ordered sonographic imaging as well as blood work.\par She will also follow-up with medical oncology (Dr. Lynne) as well.  She actually has an appointment scheduled for tomorrow.

## 2019-04-11 NOTE — REASON FOR VISIT
[FreeTextEntry1] : h/o left breast cancer; imaging review.  [Follow-Up: _____] : a [unfilled] follow-up visit

## 2019-04-11 NOTE — HISTORY OF PRESENT ILLNESS
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  Mother had pancreatic cancer.  Brother has prostate cancer.  No BRCA testing in family.  \par \par \par Status post insertion of a mediport on 3/5/18.   Status post re-placement of mediport 3/29/18 for neoadjuvant chemotherapy.  Completed Herceptin and Perjeta.  \par \par Status post Left NLOC of three areas and SLNB 8/24/18 demonstrating 0/2 (-); 3. Breast, left 12 o'clock mass, needle localized lumpectomy: Sclerosing large duct papilloma associated with florid duct hyperplasia and coarse stromal calcifications, radial sclerosing lesion (radial scar).  Diffuse histologic changes consistent with prior systemic therapy effect.  Focal healing prior biopsy site change with no residual carcinoma identified.  AJCC 8th Edition Pathologic Stage: ypT0, p(sn)N0, pMx, pCR (pathologic complete response to neoadjuvant therapy).\par Breast, left 11 o'clock mass, needle localized lumpectomy: Radial sclerosing lesion (radial scar) located adjacent to healing prior biopsy site changes.\par Breast, left 9 o'clock mass, needle localized lumpectomy: Sclerosing small duct papilloma containing florid duct hyperplasia with adjacent healing prior biopsy site changes.\par \par ALEK ALMONTE is a 68 year old female patient who presents today in follow up for left breast cancer.\par Since her last visit, she has no new breast related complaints. \par She has some abdominal complaints for which she is being evaluated by GI.\par Imaging was done on 04/03/19, which revealed probably benign calcifications in the left breast and probably benign bilateral breast masses.\par \par She presents today for evaluation and imaging review.

## 2019-04-11 NOTE — PAST MEDICAL HISTORY
[Postmenopausal] : The patient is postmenopausal [Menarche Age ____] : age at menarche was [unfilled] [Menopause Age____] : age at menopause was [unfilled] [Total Preg ___] : G[unfilled] [History of Hormone Replacement Treatment] : has no history of hormone replacement treatment [FreeTextEntry5] : none [FreeTextEntry6] : none [FreeTextEntry7] : none [FreeTextEntry8] : none

## 2019-04-11 NOTE — DATA REVIEWED
[FreeTextEntry1] : Left Uni Dx Mammo & B/L Sono - 04/03/19:\par BREAST COMPOSITION: The breasts are heterogeneously dense, which may obscure \par small masses. \par \par FINDINGS: \par \par MAMMOGRAM: \par Architectural distortion with postoperative seroma in the left superior \par breast is consistent with post-lumpectomy change. Calcifications posterior \par and inferior to the lumpectomy site are not significantly changed from \par 8/24/2018 and were likely present on 1/18/2018. These are likely benign and \par continued follow-up is recommended. \par \par ULTRASOUND: \par Targeted bilateral breast ultrasound was performed. \par \par Oval, hypoechoic mass in the right breast, 4:00 axis, 2 to 3 cm from the \par nipple is stable, measuring 0.9 x 1.2 x 0.3 cm. Oval, hypoechoic mass in the \par right breast, 2:00 axis, 7 cm from the nipple is stable, measuring 1.7 x 1.1 \par x 0.5 cm. This is likely benign and continued follow-up is recommended. \par \par Oval, hypoechoic mass in the left breast, 12:00 axis, 4 to 5 cm from the \par nipple adjacent to the lumpectomy site is likely due to developing fat \par necrosis. This measures 1.1 x 0.8 x 0.5 cm and is likely benign. \par \par \par IMPRESSION: \par \par 1. Probably benign calcifications in the left breast as above. \par \par 2. Probably benign bilateral breast masses. \par \par \par Recommendation: Follow-up bilateral diagnostic mammogram and ultrasound in 6 \par months. \par \par BI-RADS category 3: Probably Benign

## 2019-04-11 NOTE — REVIEW OF SYSTEMS
[Breast Pain] : no breast pain [Breast Lump] : no breast lump [Abdominal Pain] : abdominal pain [Nipple Inverted] : no inversion of the nipple [Nipple Discharge] : no nipple discharge [Negative] : Constitutional

## 2019-04-12 ENCOUNTER — APPOINTMENT (OUTPATIENT)
Dept: HEMATOLOGY ONCOLOGY | Facility: CLINIC | Age: 69
End: 2019-04-12

## 2019-04-12 ENCOUNTER — LABORATORY RESULT (OUTPATIENT)
Age: 69
End: 2019-04-12

## 2019-04-12 ENCOUNTER — APPOINTMENT (OUTPATIENT)
Dept: INFUSION THERAPY | Facility: CLINIC | Age: 69
End: 2019-04-12

## 2019-04-12 ENCOUNTER — OUTPATIENT (OUTPATIENT)
Dept: OUTPATIENT SERVICES | Facility: HOSPITAL | Age: 69
LOS: 1 days | Discharge: HOME | End: 2019-04-12

## 2019-04-12 VITALS
HEIGHT: 67 IN | SYSTOLIC BLOOD PRESSURE: 143 MMHG | TEMPERATURE: 96.7 F | DIASTOLIC BLOOD PRESSURE: 89 MMHG | RESPIRATION RATE: 14 BRPM | HEART RATE: 84 BPM | BODY MASS INDEX: 24.48 KG/M2 | WEIGHT: 156 LBS

## 2019-04-12 DIAGNOSIS — Z87.2 PERSONAL HISTORY OF DISEASES OF THE SKIN AND SUBCUTANEOUS TISSUE: Chronic | ICD-10-CM

## 2019-04-12 DIAGNOSIS — Z90.89 ACQUIRED ABSENCE OF OTHER ORGANS: Chronic | ICD-10-CM

## 2019-04-12 DIAGNOSIS — R92.1 MAMMOGRAPHIC CALCIFICATION FOUND ON DIAGNOSTIC IMAGING OF BREAST: Chronic | ICD-10-CM

## 2019-04-12 DIAGNOSIS — Z51.11 ENCOUNTER FOR ANTINEOPLASTIC CHEMOTHERAPY: ICD-10-CM

## 2019-04-12 DIAGNOSIS — C50.812 MALIGNANT NEOPLASM OF OVERLAPPING SITES OF LEFT FEMALE BREAST: ICD-10-CM

## 2019-04-12 LAB
ALBUMIN SERPL ELPH-MCNC: 4 G/DL
ALP BLD-CCNC: 114 U/L
ALT SERPL-CCNC: 32 U/L
ANION GAP SERPL CALC-SCNC: 17 MMOL/L
AST SERPL-CCNC: 22 U/L
BILIRUB DIRECT SERPL-MCNC: <0.2 MG/DL
BILIRUB INDIRECT SERPL-MCNC: >0.1 MG/DL
BILIRUB SERPL-MCNC: 0.3 MG/DL
BUN SERPL-MCNC: 24 MG/DL
CALCIUM SERPL-MCNC: 9.3 MG/DL
CANCER AG15-3 SERPL-ACNC: 21.3 U/ML
CEA SERPL-MCNC: 1.7 NG/ML
CHLORIDE SERPL-SCNC: 103 MMOL/L
CO2 SERPL-SCNC: 22 MMOL/L
CREAT SERPL-MCNC: 0.8 MG/DL
GLUCOSE SERPL-MCNC: 151 MG/DL
HCT VFR BLD CALC: 36.9 %
HGB BLD-MCNC: 12.5 G/DL
MAGNESIUM SERPL-MCNC: 1.8 MG/DL
MCHC RBC-ENTMCNC: 30.9 PG
MCHC RBC-ENTMCNC: 33.9 G/DL
MCV RBC AUTO: 91.3 FL
PLATELET # BLD AUTO: 168 K/UL
PMV BLD: 10.4 FL
POTASSIUM SERPL-SCNC: 3.5 MMOL/L
PROT SERPL-MCNC: 6.6 G/DL
RBC # BLD: 4.04 M/UL
RBC # FLD: 13.1 %
SODIUM SERPL-SCNC: 142 MMOL/L
WBC # FLD AUTO: 5.93 K/UL

## 2019-04-13 LAB
ALBUMIN SERPL ELPH-MCNC: 3.7 G/DL
ALP BLD-CCNC: 717 U/L
ALT SERPL-CCNC: 172 U/L
ANION GAP SERPL CALC-SCNC: 15 MMOL/L
AST SERPL-CCNC: 63 U/L
BILIRUB SERPL-MCNC: 1.4 MG/DL
BUN SERPL-MCNC: 14 MG/DL
CALCIUM SERPL-MCNC: 9.6 MG/DL
CANCER AG15-3 SERPL-ACNC: 23 U/ML
CEA SERPL-MCNC: 1.4 NG/ML
CHLORIDE SERPL-SCNC: 100 MMOL/L
CO2 SERPL-SCNC: 22 MMOL/L
CREAT SERPL-MCNC: 0.7 MG/DL
GLUCOSE SERPL-MCNC: 89 MG/DL
HCT VFR BLD CALC: 39.7 %
HGB BLD-MCNC: 12.8 G/DL
MCHC RBC-ENTMCNC: 30.5 PG
MCHC RBC-ENTMCNC: 32.2 G/DL
MCV RBC AUTO: 94.5 FL
PLATELET # BLD AUTO: 247 K/UL
PMV BLD: 10.8 FL
POTASSIUM SERPL-SCNC: 4 MMOL/L
PROT SERPL-MCNC: 6.6 G/DL
RBC # BLD: 4.2 M/UL
RBC # FLD: 13.4 %
SODIUM SERPL-SCNC: 137 MMOL/L
WBC # FLD AUTO: 5.93 K/UL

## 2019-04-13 NOTE — ASSESSMENT
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 Repeat echo\par -- Blood work today for CBC, CMP and Magnesium, and tumor markers \par -- Left breast dx mammo and right breast US (for benign lesions) in 3/2019.\par -- Followup with PCP for health maintenance.\par -- RTO for followup visit in 6 weeks.\par \par Patient seen and examined with  who agreed with the above.

## 2019-04-13 NOTE — PHYSICAL EXAM
[Restricted in physically strenuous activity but ambulatory and able to carry out work of a light or sedentary nature] : Status 1- Restricted in physically strenuous activity but ambulatory and able to carry out work of a light or sedentary nature, e.g., light house work, office work [Normal] : affect appropriate [de-identified] : Left breast lumpectomy surgical incision healed up well. Mild tenderness. left SLNB scar healed well.

## 2019-04-13 NOTE — RESULTS/DATA
[FreeTextEntry1] : Breast US (1/2/19):\par Oval, hypoechoic, circumscribed, parallel mass in the right breast, 4:00  axis, 2 to 3 cm from the nipple measures 0.9 x 0.4 x 1.1 cm. This is not  significantly changed in size from 2/26/2018 where it measured 1.0 x 0.4  x 1.1 cm, and was previously biopsy proven to be benign.  Additional oval, circumscribed, hypoechoic mass in the right breast, 2:00  axis, 7 cm from the nipple measures 1.7 x 1.2 x 0.5 cm. This was likely  present on the prior MRI of 2/15/2018 (series 8, image 49) and is also  likely benign. Continued sonographic follow-up is recommended.  Benign post lumpectomy changes are seen in the left breast, 12:00 axis.  No suspicious solid or cystic masses are seen in the left breast.

## 2019-04-13 NOTE — HISTORY OF PRESENT ILLNESS
[de-identified] : 66 yo female is here for f/u visit and chemotherapy. She was recently diagnosed left breast cancer. She had screening mammogram in 06/2015 and it was normal. In Jan 2018 she noticed a lump in left breast. A diagnostic mammo on 1/18/2018 showed a  2.1  cm  mass In  the  region  of  patient's  palpable  abnormality.  No  other  suspicious  masses,  areas of  architectural  distortion  or  cluster  of  microcalcifications  in  either  breast.  Evaluation  of  the  left  axilla  demonstrates  normal-appearing  lymph  nodes.\par   \par Targeted  Left  Breast  Sonogram showed  \par In  the  region  of  the  patient's  palpable  abnormality  at  the  12:00  location  3  cm  from  the  nipple,  there  is  an  irregular  hypoechoic  mass  measuring  5.2  cm  x  3.2  cm  x  1.2  cm  which  corresponds  to  mammographic  findings. \par Other  masses  also  identified  within  the  left  breast  and  are  as  follows: 6:00  location  2  cm  from  the  nipple  measuring  4.1  cm  x  2.4  cm  x  1.4  cm,  9-10  o'clock  location  4  cm  from  the  nipple  measuring  2.1  cm  1.4  cm  x  0.4  cm  and  11:00  location  5  to  6  cm  from  the  nipple  measuring  1.3  cm  x  2.1  cm  x  0.4  cm.   An  ultrasound-guided  core  biopsy  of  the  each  mass  is  recommended\par \par She then had USG guided biopsies of those lesions. Pathology showed \par Site  A:  Left  breast,  11:00  N5-6. \par Intraductal  papilloma  containing  florid  duct  hyperplasia. \par   \par Site  B:  Left  breast  9-10  o'clock  N4. \par -Complex  sclerosing  lesion  (radial  scar)  containing  focal  florid  duct  hyperplasia.  \par -Atrophic  breast  tissue  associated  with  coarse  stromal  calcifications. \par  Site  C:  Left  Breast  6:00  N2 \par -Benign  breast  tissue  with  proliferative  type  fibrocystic  changes  including  dominant  dense  stromal  fibrosis,  focal  florid  duct  hyperplasia,  sclerosing  adenosis,  macrocyst  wall  fragments,  columnar  cell  change/hyperplasia  without  atypia,  and  rare  microcalcifications  present  in  benign  ducts. \par   \par Site  D:  Left  breast  12:00  N3 \par -Invasive  poorly  differentiated  duct  carcinoma  with  focal  necrosis, ER negative, OR <1%, Her-2 positive 3+ by IHC and Ki 67 50%.\par   \par Recommendation:  Surgical  excision.  Surgical  and  oncologic  follow-up  for  the  biopsy-proven  12:00  carcinoma  is  recommended.    Surgical  planning  for  excision  of  the  11:00  and  9-10  o'clock  high  risk  lesions  also  recommended\par \par On 2/13/2018, PET/CT  showed abnormal increased uptake is seen in multiple areas in the left breast. Max SUV 13.92 in the superior breast mass near surgical clip findings are \par consistent with biopsy proven malignancy in the superior mass. Remaining of the abnormal areas of increased uptake could be malignancy and/or postinflammatory nature. Correlate the findings with the biopsy reports.  There are PET positive left axillary lymph nodes with a max SUV 2.56. Findings could be postinflammatory and/or malignancy. \par No other areas of abnormal increased uptake is seen to suggest biologic tumor activity. \par \par On 2/15/18,  MRI breast was done. the report is still pending.\par \par The patient saw Dr. Hampton for surgical consultation. In light of large Her-2 positive tumor, she was given an option to have neoadjuvant chemotherapy followed by surgery. Monica is here with her sister for consultation of neoadjuvant systemic therapy. [de-identified] : Monica has completed neoadjuvant chemotherapy with TCHP x 6 times.  She responded well and her left breast mass has reduced significantly. She complains fatigue, \par She experienced side effects from the chemotherapy including diarrhea, poor appetite and insomnia. These symptoms have got better since chemo completed.\par \par On 7/27/18, she had PET/CT which showed No pathologic FDG uptake to suggest definite biologic tumor activity. All previously seen pathologic FDG uptake in the left breast has resolved. Previously seen mild FDG uptake in left axillary nodes has resolved. Findings are consistent with good treatment response. Mildly FDG avid portacaval lymph node, SUV max 5.7, is nonspecific but less likely to represent biologic tumor activity. This can be reassessed on follow-up. \par \par On 7/23/18, she had b/l breast MRI which showed Interval decrease in size of left breast 12:00 axis biopsy-proven invasive ductal carcinoma; currently it measures up to 1.0 cm, previously measuring up to 3.2 cm. \par \par 9/7/18:\par The patient is here today for followup and treatment. She underwent NLOC left lumpectomy and SLNB on 8/24/18. The pathology showed no evidence of residual disease and 2 SLN negative. She recovered well from surgery.\par She still has loose bowel movement sometimes. her appetite is improving.\par \par 10/19/2018\par Patient is here for a follow up prior to getting treatment with Herceptin and Perjeta. Her lumpectomy scars have healed up well and she has some mild tenderness. It assured that it will get better. She was concerned about irregularity at incision site and again we discussed the mammography findings which showed post lumpectomy changes ( 10/2018) and that it is scar tissue. \par She had mapping done for radiation, yet to start though. \par Her loose bowel movements have improved as well. \par She offers no other complaints.\par \par 12/28/18:\par The patient is here for followup visit. She completed radiation in 11/2018. She feels well and does not have new complains. In 10/2018, she had b/l dx mammo. There was no suspicious finding. In 11/2018, she had 2D echo. Her LVEF was 50-55%.\par \par 2/8/19: Pt is here for a follow-up visit. She is doing well on herceptin and perjeta. Last echo from Nov 2018 showed EF of 55-65%. No fresh complaints. Last US from Jan 2019 showed:\par Oval, hypoechoic, circumscribed, parallel mass in the right breast, 4:00  axis, 2 to 3 cm from the nipple measures 0.9 x 0.4 x 1.1 cm. This is not  significantly changed in size from 2/26/2018 where it measured 1.0 x 0.4  x 1.1 cm, and was previously biopsy proven to be benign.\par   Additional oval, circumscribed, hypoechoic mass in the right breast, 2:00  axis, 7 cm from the nipple measures 1.7 x 1.2 x 0.5 cm. This was likely  present on the prior MRI of 2/15/2018 (series 8, image 49) and is also  likely benign. Continued sonographic follow-up is recommended.\par   Benign post lumpectomy changes are seen in the left breast, 12:00 axis.  No suspicious solid or cystic masses are seen in the left breast.\par \par 4/12/19:\par The patient is here for f/u visit. She complains RUQ pain for a few weeks. She saw Dr. Manzo and had abdominal sonogram which showed Cholelithiasis without sonographic evidence of acute cholecystitis. CBD dilatation (11 mm), new since March 2016. Recommend correlation with laboratory tests. If indicated, MRCP can be obtained to exclude CBD \par obstruction or stone. She has been taking Cipro and Flagyl. She did not have any fever. She is given a referral for MRCP but she has not made an appointment yet.\par She has b/l dx mammo on 4/219. There was no suspicious finding.

## 2019-04-13 NOTE — PHYSICAL EXAM
[Restricted in physically strenuous activity but ambulatory and able to carry out work of a light or sedentary nature] : Status 1- Restricted in physically strenuous activity but ambulatory and able to carry out work of a light or sedentary nature, e.g., light house work, office work [Normal] : affect appropriate [de-identified] : Left breast lumpectomy surgical incision healed up well. Mild tenderness. left SLNB scar healed well.

## 2019-04-13 NOTE — ASSESSMENT
[FreeTextEntry1] : 68 yo female has poorly differentiated IDC of the left breast, ER/MD negative and Her-2 positive, with large tumor mass and FDG avid left axillary adenopathy, clinical stage T3N1, s/p neoadjuvant chemotherapy followed by left lumpectomy and SLNB, achieved pCR.\par Now on adjuvant Anti- her 2 treatments\par \par Recommendation:\par -- Continue surveillance mammogram. \par -- Flush port and blood work for CBC, CMP and tumor markers.\par -- Schedule MRCP and f/u with Dr. Manzo to evaluate dilated CBD.\par -- Followup with PCP for health maintenance.\par -- RTO for followup visit in 3 months.\par \par Addendum: \par Blood work reviewed. Liver enzymes and T bili are all elevated. Will advise patient to get MRCP ASAP and she may need ERCP.

## 2019-04-13 NOTE — HISTORY OF PRESENT ILLNESS
[de-identified] : 68 yo female is here for f/u visit and chemotherapy. She was recently diagnosed left breast cancer. She had screening mammogram in 06/2015 and it was normal. In Jan 2018 she noticed a lump in left breast. A diagnostic mammo on 1/18/2018 showed a  2.1  cm  mass In  the  region  of  patient's  palpable  abnormality.  No  other  suspicious  masses,  areas of  architectural  distortion  or  cluster  of  microcalcifications  in  either  breast.  Evaluation  of  the  left  axilla  demonstrates  normal-appearing  lymph  nodes.\par   \par Targeted  Left  Breast  Sonogram showed  \par In  the  region  of  the  patient's  palpable  abnormality  at  the  12:00  location  3  cm  from  the  nipple,  there  is  an  irregular  hypoechoic  mass  measuring  5.2  cm  x  3.2  cm  x  1.2  cm  which  corresponds  to  mammographic  findings. \par Other  masses  also  identified  within  the  left  breast  and  are  as  follows: 6:00  location  2  cm  from  the  nipple  measuring  4.1  cm  x  2.4  cm  x  1.4  cm,  9-10  o'clock  location  4  cm  from  the  nipple  measuring  2.1  cm  1.4  cm  x  0.4  cm  and  11:00  location  5  to  6  cm  from  the  nipple  measuring  1.3  cm  x  2.1  cm  x  0.4  cm.   An  ultrasound-guided  core  biopsy  of  the  each  mass  is  recommended\par \par She then had USG guided biopsies of those lesions. Pathology showed \par Site  A:  Left  breast,  11:00  N5-6. \par Intraductal  papilloma  containing  florid  duct  hyperplasia. \par   \par Site  B:  Left  breast  9-10  o'clock  N4. \par -Complex  sclerosing  lesion  (radial  scar)  containing  focal  florid  duct  hyperplasia.  \par -Atrophic  breast  tissue  associated  with  coarse  stromal  calcifications. \par  Site  C:  Left  Breast  6:00  N2 \par -Benign  breast  tissue  with  proliferative  type  fibrocystic  changes  including  dominant  dense  stromal  fibrosis,  focal  florid  duct  hyperplasia,  sclerosing  adenosis,  macrocyst  wall  fragments,  columnar  cell  change/hyperplasia  without  atypia,  and  rare  microcalcifications  present  in  benign  ducts. \par   \par Site  D:  Left  breast  12:00  N3 \par -Invasive  poorly  differentiated  duct  carcinoma  with  focal  necrosis, ER negative, NC <1%, Her-2 positive 3+ by IHC and Ki 67 50%.\par   \par Recommendation:  Surgical  excision.  Surgical  and  oncologic  follow-up  for  the  biopsy-proven  12:00  carcinoma  is  recommended.    Surgical  planning  for  excision  of  the  11:00  and  9-10  o'clock  high  risk  lesions  also  recommended\par \par On 2/13/2018, PET/CT  showed abnormal increased uptake is seen in multiple areas in the left breast. Max SUV 13.92 in the superior breast mass near surgical clip findings are \par consistent with biopsy proven malignancy in the superior mass. Remaining of the abnormal areas of increased uptake could be malignancy and/or postinflammatory nature. Correlate the findings with the biopsy reports.  There are PET positive left axillary lymph nodes with a max SUV 2.56. Findings could be postinflammatory and/or malignancy. \par No other areas of abnormal increased uptake is seen to suggest biologic tumor activity. \par \par On 2/15/18,  MRI breast was done. the report is still pending.\par \par The patient saw Dr. Hampton for surgical consultation. In light of large Her-2 positive tumor, she was given an option to have neoadjuvant chemotherapy followed by surgery. Monica is here with her sister for consultation of neoadjuvant systemic therapy. [de-identified] : Monica has completed neoadjuvant chemotherapy with TCHP x 6 times.  She responded well and her left breast mass has reduced significantly. She complains fatigue, \par She experienced side effects from the chemotherapy including diarrhea, poor appetite and insomnia. These symptoms have got better since chemo completed.\par \par On 7/27/18, she had PET/CT which showed No pathologic FDG uptake to suggest definite biologic tumor activity. All previously seen pathologic FDG uptake in the left breast has resolved. Previously seen mild FDG uptake in left axillary nodes has resolved. Findings are consistent with good treatment response. Mildly FDG avid portacaval lymph node, SUV max 5.7, is nonspecific but less likely to represent biologic tumor activity. This can be reassessed on follow-up. \par \par On 7/23/18, she had b/l breast MRI which showed Interval decrease in size of left breast 12:00 axis biopsy-proven invasive ductal carcinoma; currently it measures up to 1.0 cm, previously measuring up to 3.2 cm. \par \par 9/7/18:\par The patient is here today for followup and treatment. She underwent NLOC left lumpectomy and SLNB on 8/24/18. The pathology showed no evidence of residual disease and 2 SLN negative. She recovered well from surgery.\par She still has loose bowel movement sometimes. her appetite is improving.\par \par 10/19/2018\par Patient is here for a follow up prior to getting treatment with Herceptin and Perjeta. Her lumpectomy scars have healed up well and she has some mild tenderness. It assured that it will get better. She was concerned about irregularity at incision site and again we discussed the mammography findings which showed post lumpectomy changes ( 10/2018) and that it is scar tissue. \par She had mapping done for radiation, yet to start though. \par Her loose bowel movements have improved as well. \par She offers no other complaints.\par \par 12/28/18:\par The patient is here for followup visit. She completed radiation in 11/2018. She feels well and does not have new complains. In 10/2018, she had b/l dx mammo. There was no suspicious finding. In 11/2018, she had 2D echo. Her LVEF was 50-55%.\par \par 2/8/19: Pt is here for a follow-up visit. She is doing well on herceptin and perjeta. Last echo from Nov 2018 showed EF of 55-65%. No fresh complaints. Last US from Jan 2019 showed:\par Oval, hypoechoic, circumscribed, parallel mass in the right breast, 4:00  axis, 2 to 3 cm from the nipple measures 0.9 x 0.4 x 1.1 cm. This is not  significantly changed in size from 2/26/2018 where it measured 1.0 x 0.4  x 1.1 cm, and was previously biopsy proven to be benign.\par   Additional oval, circumscribed, hypoechoic mass in the right breast, 2:00  axis, 7 cm from the nipple measures 1.7 x 1.2 x 0.5 cm. This was likely  present on the prior MRI of 2/15/2018 (series 8, image 49) and is also  likely benign. Continued sonographic follow-up is recommended.\par   Benign post lumpectomy changes are seen in the left breast, 12:00 axis.  No suspicious solid or cystic masses are seen in the left breast.

## 2019-04-13 NOTE — CONSULT LETTER
[Dear  ___] : Dear  [unfilled], [Courtesy Letter:] : I had the pleasure of seeing your patient, [unfilled], in my office today. [Please see my note below.] : Please see my note below. [Sincerely,] : Sincerely, [DrRupesh  ___] : Dr. RIDER [DrRupesh ___] : Dr. RIDER [FreeTextEntry3] : Ernesto Lynne MD, PhD.

## 2019-04-18 ENCOUNTER — OUTPATIENT (OUTPATIENT)
Dept: OUTPATIENT SERVICES | Facility: HOSPITAL | Age: 69
LOS: 1 days | Discharge: HOME | End: 2019-04-18

## 2019-04-18 ENCOUNTER — TRANSCRIPTION ENCOUNTER (OUTPATIENT)
Age: 69
End: 2019-04-18

## 2019-04-18 VITALS
HEIGHT: 67 IN | RESPIRATION RATE: 20 BRPM | SYSTOLIC BLOOD PRESSURE: 121 MMHG | HEART RATE: 59 BPM | TEMPERATURE: 99 F | WEIGHT: 156.97 LBS | DIASTOLIC BLOOD PRESSURE: 67 MMHG

## 2019-04-18 VITALS — DIASTOLIC BLOOD PRESSURE: 76 MMHG | RESPIRATION RATE: 18 BRPM | SYSTOLIC BLOOD PRESSURE: 128 MMHG | HEART RATE: 56 BPM

## 2019-04-18 DIAGNOSIS — Z87.2 PERSONAL HISTORY OF DISEASES OF THE SKIN AND SUBCUTANEOUS TISSUE: Chronic | ICD-10-CM

## 2019-04-18 DIAGNOSIS — R92.1 MAMMOGRAPHIC CALCIFICATION FOUND ON DIAGNOSTIC IMAGING OF BREAST: Chronic | ICD-10-CM

## 2019-04-18 DIAGNOSIS — Z90.89 ACQUIRED ABSENCE OF OTHER ORGANS: Chronic | ICD-10-CM

## 2019-04-18 RX ORDER — METHYLPREDNISOLONE 4 MG
1 TABLET ORAL
Qty: 0 | Refills: 0 | COMMUNITY

## 2019-04-18 RX ORDER — CIPROFLOXACIN LACTATE 400MG/40ML
400 VIAL (ML) INTRAVENOUS ONCE
Qty: 0 | Refills: 0 | Status: COMPLETED | OUTPATIENT
Start: 2019-04-18 | End: 2019-04-18

## 2019-04-18 RX ORDER — MULTIVIT-MIN/FERROUS GLUCONATE 9 MG/15 ML
1 LIQUID (ML) ORAL
Qty: 0 | Refills: 0 | COMMUNITY

## 2019-04-18 RX ORDER — ASCORBIC ACID 60 MG
1 TABLET,CHEWABLE ORAL
Qty: 0 | Refills: 0 | COMMUNITY

## 2019-04-18 RX ADMIN — Medication 200 MILLIGRAM(S): at 12:26

## 2019-04-18 NOTE — H&P PST ADULT - ASSESSMENT
69 yo F with PMH mentioned here for ERCP for CBD stone.   Risks and benefits discussed with the patient.   Will proceed with the scheduled case.

## 2019-04-18 NOTE — ASU PATIENT PROFILE, ADULT - VISION (WITH CORRECTIVE LENSES IF THE PATIENT USUALLY WEARS THEM):
Will discuss results at patient's upcoming appointment  
Partially impaired: cannot see medication labels or newsprint, but can see obstacles in path, and the surrounding layout; can count fingers at arm's length

## 2019-04-18 NOTE — ASU DISCHARGE PLAN (ADULT/PEDIATRIC) - CALL YOUR DOCTOR IF YOU HAVE ANY OF THE FOLLOWING:
Inability to tolerate liquids or foods/Nausea and vomiting that does not stop/Fever greater than (need to indicate Fahrenheit or Celsius)/Pain not relieved by Medications/Bleeding that does not stop

## 2019-04-24 DIAGNOSIS — K80.50 CALCULUS OF BILE DUCT WITHOUT CHOLANGITIS OR CHOLECYSTITIS WITHOUT OBSTRUCTION: ICD-10-CM

## 2019-04-24 DIAGNOSIS — E03.9 HYPOTHYROIDISM, UNSPECIFIED: ICD-10-CM

## 2019-04-24 DIAGNOSIS — K22.70 BARRETT'S ESOPHAGUS WITHOUT DYSPLASIA: ICD-10-CM

## 2019-04-24 DIAGNOSIS — K21.9 GASTRO-ESOPHAGEAL REFLUX DISEASE WITHOUT ESOPHAGITIS: ICD-10-CM

## 2019-04-24 DIAGNOSIS — F41.9 ANXIETY DISORDER, UNSPECIFIED: ICD-10-CM

## 2019-04-24 DIAGNOSIS — I34.1 NONRHEUMATIC MITRAL (VALVE) PROLAPSE: ICD-10-CM

## 2019-04-24 DIAGNOSIS — I10 ESSENTIAL (PRIMARY) HYPERTENSION: ICD-10-CM

## 2019-04-25 ENCOUNTER — OUTPATIENT (OUTPATIENT)
Dept: OUTPATIENT SERVICES | Facility: HOSPITAL | Age: 69
LOS: 1 days | Discharge: HOME | End: 2019-04-25
Payer: MEDICARE

## 2019-04-25 DIAGNOSIS — Z87.2 PERSONAL HISTORY OF DISEASES OF THE SKIN AND SUBCUTANEOUS TISSUE: Chronic | ICD-10-CM

## 2019-04-25 DIAGNOSIS — K82.8 OTHER SPECIFIED DISEASES OF GALLBLADDER: ICD-10-CM

## 2019-04-25 DIAGNOSIS — R92.1 MAMMOGRAPHIC CALCIFICATION FOUND ON DIAGNOSTIC IMAGING OF BREAST: Chronic | ICD-10-CM

## 2019-04-25 DIAGNOSIS — Z90.89 ACQUIRED ABSENCE OF OTHER ORGANS: Chronic | ICD-10-CM

## 2019-04-25 PROCEDURE — 74181 MRI ABDOMEN W/O CONTRAST: CPT | Mod: 26

## 2019-05-03 ENCOUNTER — OUTPATIENT (OUTPATIENT)
Dept: OUTPATIENT SERVICES | Facility: HOSPITAL | Age: 69
LOS: 1 days | Discharge: HOME | End: 2019-05-03
Payer: MEDICARE

## 2019-05-03 VITALS
HEART RATE: 74 BPM | HEIGHT: 67 IN | TEMPERATURE: 96 F | WEIGHT: 151.24 LBS | RESPIRATION RATE: 18 BRPM | SYSTOLIC BLOOD PRESSURE: 112 MMHG | DIASTOLIC BLOOD PRESSURE: 62 MMHG | OXYGEN SATURATION: 100 %

## 2019-05-03 DIAGNOSIS — Z87.2 PERSONAL HISTORY OF DISEASES OF THE SKIN AND SUBCUTANEOUS TISSUE: Chronic | ICD-10-CM

## 2019-05-03 DIAGNOSIS — K80.20 CALCULUS OF GALLBLADDER WITHOUT CHOLECYSTITIS WITHOUT OBSTRUCTION: ICD-10-CM

## 2019-05-03 DIAGNOSIS — Z98.890 OTHER SPECIFIED POSTPROCEDURAL STATES: Chronic | ICD-10-CM

## 2019-05-03 DIAGNOSIS — Z01.818 ENCOUNTER FOR OTHER PREPROCEDURAL EXAMINATION: ICD-10-CM

## 2019-05-03 DIAGNOSIS — Z90.89 ACQUIRED ABSENCE OF OTHER ORGANS: Chronic | ICD-10-CM

## 2019-05-03 DIAGNOSIS — R93.3 ABNORMAL FINDINGS ON DIAGNOSTIC IMAGING OF OTHER PARTS OF DIGESTIVE TRACT: Chronic | ICD-10-CM

## 2019-05-03 DIAGNOSIS — R92.1 MAMMOGRAPHIC CALCIFICATION FOUND ON DIAGNOSTIC IMAGING OF BREAST: Chronic | ICD-10-CM

## 2019-05-03 LAB
ALBUMIN SERPL ELPH-MCNC: 4.2 G/DL — SIGNIFICANT CHANGE UP (ref 3.5–5.2)
ALP SERPL-CCNC: 187 U/L — HIGH (ref 30–115)
ALT FLD-CCNC: 29 U/L — SIGNIFICANT CHANGE UP (ref 0–41)
ANION GAP SERPL CALC-SCNC: 13 MMOL/L — SIGNIFICANT CHANGE UP (ref 7–14)
APTT BLD: 31.4 SEC — SIGNIFICANT CHANGE UP (ref 27–39.2)
AST SERPL-CCNC: 24 U/L — SIGNIFICANT CHANGE UP (ref 0–41)
BASOPHILS # BLD AUTO: 0.02 K/UL — SIGNIFICANT CHANGE UP (ref 0–0.2)
BASOPHILS NFR BLD AUTO: 0.3 % — SIGNIFICANT CHANGE UP (ref 0–1)
BILIRUB SERPL-MCNC: 0.6 MG/DL — SIGNIFICANT CHANGE UP (ref 0.2–1.2)
BUN SERPL-MCNC: 13 MG/DL — SIGNIFICANT CHANGE UP (ref 10–20)
CALCIUM SERPL-MCNC: 9.8 MG/DL — SIGNIFICANT CHANGE UP (ref 8.5–10.1)
CHLORIDE SERPL-SCNC: 99 MMOL/L — SIGNIFICANT CHANGE UP (ref 98–110)
CO2 SERPL-SCNC: 25 MMOL/L — SIGNIFICANT CHANGE UP (ref 17–32)
CREAT SERPL-MCNC: 0.8 MG/DL — SIGNIFICANT CHANGE UP (ref 0.7–1.5)
EOSINOPHIL # BLD AUTO: 0.21 K/UL — SIGNIFICANT CHANGE UP (ref 0–0.7)
EOSINOPHIL NFR BLD AUTO: 3.4 % — SIGNIFICANT CHANGE UP (ref 0–8)
GLUCOSE SERPL-MCNC: 85 MG/DL — SIGNIFICANT CHANGE UP (ref 70–99)
HCT VFR BLD CALC: 39.7 % — SIGNIFICANT CHANGE UP (ref 37–47)
HGB BLD-MCNC: 13.3 G/DL — SIGNIFICANT CHANGE UP (ref 12–16)
IMM GRANULOCYTES NFR BLD AUTO: 0.5 % — HIGH (ref 0.1–0.3)
INR BLD: 1.15 RATIO — SIGNIFICANT CHANGE UP (ref 0.65–1.3)
LYMPHOCYTES # BLD AUTO: 0.75 K/UL — LOW (ref 1.2–3.4)
LYMPHOCYTES # BLD AUTO: 12.1 % — LOW (ref 20.5–51.1)
MCHC RBC-ENTMCNC: 30.9 PG — SIGNIFICANT CHANGE UP (ref 27–31)
MCHC RBC-ENTMCNC: 33.5 G/DL — SIGNIFICANT CHANGE UP (ref 32–37)
MCV RBC AUTO: 92.3 FL — SIGNIFICANT CHANGE UP (ref 81–99)
MONOCYTES # BLD AUTO: 0.8 K/UL — HIGH (ref 0.1–0.6)
MONOCYTES NFR BLD AUTO: 12.9 % — HIGH (ref 1.7–9.3)
NEUTROPHILS # BLD AUTO: 4.41 K/UL — SIGNIFICANT CHANGE UP (ref 1.4–6.5)
NEUTROPHILS NFR BLD AUTO: 70.8 % — SIGNIFICANT CHANGE UP (ref 42.2–75.2)
NRBC # BLD: 0 /100 WBCS — SIGNIFICANT CHANGE UP (ref 0–0)
PLATELET # BLD AUTO: 215 K/UL — SIGNIFICANT CHANGE UP (ref 130–400)
POTASSIUM SERPL-MCNC: 4.7 MMOL/L — SIGNIFICANT CHANGE UP (ref 3.5–5)
POTASSIUM SERPL-SCNC: 4.7 MMOL/L — SIGNIFICANT CHANGE UP (ref 3.5–5)
PROT SERPL-MCNC: 6.8 G/DL — SIGNIFICANT CHANGE UP (ref 6–8)
PROTHROM AB SERPL-ACNC: 13.2 SEC — HIGH (ref 9.95–12.87)
RBC # BLD: 4.3 M/UL — SIGNIFICANT CHANGE UP (ref 4.2–5.4)
RBC # FLD: 13 % — SIGNIFICANT CHANGE UP (ref 11.5–14.5)
SODIUM SERPL-SCNC: 137 MMOL/L — SIGNIFICANT CHANGE UP (ref 135–146)
WBC # BLD: 6.22 K/UL — SIGNIFICANT CHANGE UP (ref 4.8–10.8)
WBC # FLD AUTO: 6.22 K/UL — SIGNIFICANT CHANGE UP (ref 4.8–10.8)

## 2019-05-03 PROCEDURE — 71046 X-RAY EXAM CHEST 2 VIEWS: CPT | Mod: 26

## 2019-05-03 RX ORDER — ALPRAZOLAM 0.25 MG
1 TABLET ORAL
Qty: 0 | Refills: 0 | COMMUNITY

## 2019-05-03 NOTE — H&P PST ADULT - BP NONINVASIVE DIASTOLIC (MM HG)
AUDIOLOGY REPORT:    Patient was referred to Audiology from ENT by Dr. López for a hearing examination. Patient reports that his left ear feels plugged again. They were accompanied today by their brother and mother.    Testing:    Otoscopy:   Otoscopic exam indicates ears are clear of cerumen bilaterally     Tympanograms:    RIGHT: negative pressure -175 daPa     LEFT:   negative pressure -199 daPa    Thresholds:   Pure Tone Thresholds assessed using conventional audiometry with good  reliability from 250-8000 Hz bilaterally using insert earphones     RIGHT:  normal hearing sensitivity for all frequencies tested.     LEFT:    normal hearing sensitivity for all frequencies tested.    NOTE: there is an air-bone gap of 15 dB in the left ear between 250 and 500 Hz    Speech Reception Threshold:    RIGHT: 5 dB HL    LEFT:   10 dB HL    Word Recognition Score:     RIGHT: 100% at 50 dB HL using PBK-50 recorded word list.    LEFT:   100% at 50 dB HL using PBK-50 recorded word list.    Discussed results with the patient and his mother.     Patient was returned to ENT for follow up.     Brody Ac CCC-A  Licensed Audiologist  3/15/2019                                             61

## 2019-05-03 NOTE — H&P PST ADULT - NSICDXFAMILYHX_GEN_ALL_CORE_FT
FAMILY HISTORY:  Father  Still living? Unknown  CAD (coronary artery disease), Age at diagnosis: Age Unknown  HTN (hypertension), Age at diagnosis: Age Unknown    Mother  Still living? No  Family history of pancreatic cancer, Age at diagnosis: Age Unknown    Grandparent  Still living? Unknown  CAD (coronary artery disease), Age at diagnosis: Age Unknown    Aunt  Still living? Unknown  Family history of breast cancer, Age at diagnosis: Age Unknown

## 2019-05-03 NOTE — H&P PST ADULT - HISTORY OF PRESENT ILLNESS
CURRENTLY  DENIES ANY CP, SOB,PALPITATIONS,COUGH OR DYSURIA  EXERCISE TOLERANCE 1 FOS WITHOUT SOB    AS PER PATIENT  this is his/her complete medical history including medications - PRESCRIPTIONS  OVER THE COUNTER MEDS    HAS HISTORY OF LEFT BREAST CANCER LAST CHEMO TREATMENT 3/2019, LAST RADIATION TREATMENT 11/2019

## 2019-05-03 NOTE — H&P PST ADULT - NSICDXPASTSURGICALHX_GEN_ALL_CORE_FT
PAST SURGICAL HISTORY:  Abnormal magnetic resonance cholangiopancreatography (MRCP) STENT PLACED IN BILE DUCT    Breast calcifications     H/O sebaceous cyst     S/P lumpectomy, left breast     S/P tonsillectomy

## 2019-05-03 NOTE — H&P PST ADULT - NSICDXPASTMEDICALHX_GEN_ALL_CORE_FT
PAST MEDICAL HISTORY:  Anxiety     Barretts esophagus     Breast cancer left arm prec    GERD (gastroesophageal reflux disease)     Hiatal hernia     HTN (hypertension)     Hypothyroid med dose stable ~ 1 yr    MVP (mitral valve prolapse)     Thyroid nodule

## 2019-05-03 NOTE — H&P PST ADULT - REASON FOR ADMISSION
69 Y/O F SCHEDULED FOR PAST FOR LAPAROSCOPIC CHOLECYSTECTOMY POSSIBLE OPEN ON 5/8/19- HAS BEEN HAVING ATTACKS X 2 M- S/P MRCP WHICH SHOWED MULTIPLE STONES

## 2019-05-04 PROBLEM — C50.919 MALIGNANT NEOPLASM OF UNSPECIFIED SITE OF UNSPECIFIED FEMALE BREAST: Chronic | Status: ACTIVE | Noted: 2018-02-27

## 2019-05-08 ENCOUNTER — OUTPATIENT (OUTPATIENT)
Dept: OUTPATIENT SERVICES | Facility: HOSPITAL | Age: 69
LOS: 1 days | Discharge: HOME | End: 2019-05-08
Payer: MEDICARE

## 2019-05-08 ENCOUNTER — RESULT REVIEW (OUTPATIENT)
Age: 69
End: 2019-05-08

## 2019-05-08 VITALS
DIASTOLIC BLOOD PRESSURE: 67 MMHG | WEIGHT: 153 LBS | SYSTOLIC BLOOD PRESSURE: 103 MMHG | HEIGHT: 67 IN | RESPIRATION RATE: 18 BRPM | TEMPERATURE: 98 F | HEART RATE: 67 BPM

## 2019-05-08 VITALS — RESPIRATION RATE: 18 BRPM | HEART RATE: 84 BPM | DIASTOLIC BLOOD PRESSURE: 74 MMHG | SYSTOLIC BLOOD PRESSURE: 124 MMHG

## 2019-05-08 DIAGNOSIS — R92.1 MAMMOGRAPHIC CALCIFICATION FOUND ON DIAGNOSTIC IMAGING OF BREAST: Chronic | ICD-10-CM

## 2019-05-08 DIAGNOSIS — Z87.2 PERSONAL HISTORY OF DISEASES OF THE SKIN AND SUBCUTANEOUS TISSUE: Chronic | ICD-10-CM

## 2019-05-08 DIAGNOSIS — Z98.890 OTHER SPECIFIED POSTPROCEDURAL STATES: Chronic | ICD-10-CM

## 2019-05-08 DIAGNOSIS — Z90.89 ACQUIRED ABSENCE OF OTHER ORGANS: Chronic | ICD-10-CM

## 2019-05-08 DIAGNOSIS — R93.3 ABNORMAL FINDINGS ON DIAGNOSTIC IMAGING OF OTHER PARTS OF DIGESTIVE TRACT: Chronic | ICD-10-CM

## 2019-05-08 PROCEDURE — 88304 TISSUE EXAM BY PATHOLOGIST: CPT | Mod: 26

## 2019-05-08 RX ORDER — MORPHINE SULFATE 50 MG/1
2 CAPSULE, EXTENDED RELEASE ORAL
Qty: 0 | Refills: 0 | Status: DISCONTINUED | OUTPATIENT
Start: 2019-05-08 | End: 2019-05-08

## 2019-05-08 RX ORDER — METOCLOPRAMIDE HCL 10 MG
10 TABLET ORAL ONCE
Qty: 0 | Refills: 0 | Status: COMPLETED | OUTPATIENT
Start: 2019-05-08 | End: 2019-05-08

## 2019-05-08 RX ORDER — ONDANSETRON 8 MG/1
4 TABLET, FILM COATED ORAL ONCE
Qty: 0 | Refills: 0 | Status: COMPLETED | OUTPATIENT
Start: 2019-05-08 | End: 2019-05-08

## 2019-05-08 RX ORDER — OXYCODONE AND ACETAMINOPHEN 5; 325 MG/1; MG/1
2 TABLET ORAL EVERY 6 HOURS
Qty: 0 | Refills: 0 | Status: DISCONTINUED | OUTPATIENT
Start: 2019-05-08 | End: 2019-05-08

## 2019-05-08 RX ORDER — SODIUM CHLORIDE 9 MG/ML
1000 INJECTION, SOLUTION INTRAVENOUS
Qty: 0 | Refills: 0 | Status: DISCONTINUED | OUTPATIENT
Start: 2019-05-08 | End: 2019-05-23

## 2019-05-08 RX ORDER — MORPHINE SULFATE 50 MG/1
4 CAPSULE, EXTENDED RELEASE ORAL
Qty: 0 | Refills: 0 | Status: DISCONTINUED | OUTPATIENT
Start: 2019-05-08 | End: 2019-05-08

## 2019-05-08 RX ORDER — MEPERIDINE HYDROCHLORIDE 50 MG/ML
12.5 INJECTION INTRAMUSCULAR; INTRAVENOUS; SUBCUTANEOUS ONCE
Qty: 0 | Refills: 0 | Status: DISCONTINUED | OUTPATIENT
Start: 2019-05-08 | End: 2019-05-08

## 2019-05-08 RX ADMIN — ONDANSETRON 4 MILLIGRAM(S): 8 TABLET, FILM COATED ORAL at 12:03

## 2019-05-08 RX ADMIN — SODIUM CHLORIDE 100 MILLILITER(S): 9 INJECTION, SOLUTION INTRAVENOUS at 10:25

## 2019-05-08 RX ADMIN — MORPHINE SULFATE 4 MILLIGRAM(S): 50 CAPSULE, EXTENDED RELEASE ORAL at 12:09

## 2019-05-08 RX ADMIN — MORPHINE SULFATE 2 MILLIGRAM(S): 50 CAPSULE, EXTENDED RELEASE ORAL at 12:54

## 2019-05-08 RX ADMIN — Medication 10 MILLIGRAM(S): at 12:29

## 2019-05-08 RX ADMIN — MORPHINE SULFATE 4 MILLIGRAM(S): 50 CAPSULE, EXTENDED RELEASE ORAL at 11:07

## 2019-05-08 NOTE — CHART NOTE - NSCHARTNOTEFT_GEN_A_CORE
PACU ANESTHESIA ADMISSION NOTE      Procedure:   Post op diagnosis:  lap cholecystectomy    ____  Intubated  TV:______       Rate: ______      FiO2: ______    _x___  Patent Airway    _x___  Full return of protective reflexes    _x___  Full recovery from anesthesia / back to baseline status    Vitals:  T(C): 36.5 (  HR:73  BP: 137/69  RR: 18 (  SpO2: --99     Mental Status:  _x___ Awake   _____ Alert   _____ Drowsy   _____ Sedated    Nausea/Vomiting:  _x___  NO       ______Yes,   See Post - Op Orders         Pain Scale (0-10):  __0___    Treatment: _x___ None    ____ See Post - Op/PCA Orders    Post - Operative Fluids:   __x__ Oral   ____ See Post - Op Orders    Plan: Discharge:   _x ___Home       _____Floor     _____Critical Care    _____  Other:_________________    Comments:  No anesthesia issues or complications noted.  Discharge when criteria met.

## 2019-05-10 ENCOUNTER — APPOINTMENT (OUTPATIENT)
Dept: INFUSION THERAPY | Facility: CLINIC | Age: 69
End: 2019-05-10

## 2019-05-10 LAB — SURGICAL PATHOLOGY STUDY: SIGNIFICANT CHANGE UP

## 2019-05-14 DIAGNOSIS — K21.9 GASTRO-ESOPHAGEAL REFLUX DISEASE WITHOUT ESOPHAGITIS: ICD-10-CM

## 2019-05-14 DIAGNOSIS — E78.5 HYPERLIPIDEMIA, UNSPECIFIED: ICD-10-CM

## 2019-05-14 DIAGNOSIS — I34.1 NONRHEUMATIC MITRAL (VALVE) PROLAPSE: ICD-10-CM

## 2019-05-14 DIAGNOSIS — K80.10 CALCULUS OF GALLBLADDER WITH CHRONIC CHOLECYSTITIS WITHOUT OBSTRUCTION: ICD-10-CM

## 2019-05-14 DIAGNOSIS — I10 ESSENTIAL (PRIMARY) HYPERTENSION: ICD-10-CM

## 2019-05-14 DIAGNOSIS — Z85.3 PERSONAL HISTORY OF MALIGNANT NEOPLASM OF BREAST: ICD-10-CM

## 2019-05-16 ENCOUNTER — OUTPATIENT (OUTPATIENT)
Dept: OUTPATIENT SERVICES | Facility: HOSPITAL | Age: 69
LOS: 1 days | Discharge: HOME | End: 2019-05-16

## 2019-05-16 ENCOUNTER — TRANSCRIPTION ENCOUNTER (OUTPATIENT)
Age: 69
End: 2019-05-16

## 2019-05-16 VITALS
DIASTOLIC BLOOD PRESSURE: 64 MMHG | OXYGEN SATURATION: 97 % | RESPIRATION RATE: 18 BRPM | HEART RATE: 63 BPM | SYSTOLIC BLOOD PRESSURE: 134 MMHG

## 2019-05-16 VITALS
WEIGHT: 145.95 LBS | TEMPERATURE: 98 F | HEIGHT: 67 IN | RESPIRATION RATE: 18 BRPM | SYSTOLIC BLOOD PRESSURE: 111 MMHG | HEART RATE: 81 BPM | DIASTOLIC BLOOD PRESSURE: 68 MMHG

## 2019-05-16 DIAGNOSIS — Z98.890 OTHER SPECIFIED POSTPROCEDURAL STATES: Chronic | ICD-10-CM

## 2019-05-16 DIAGNOSIS — R92.1 MAMMOGRAPHIC CALCIFICATION FOUND ON DIAGNOSTIC IMAGING OF BREAST: Chronic | ICD-10-CM

## 2019-05-16 DIAGNOSIS — Z87.2 PERSONAL HISTORY OF DISEASES OF THE SKIN AND SUBCUTANEOUS TISSUE: Chronic | ICD-10-CM

## 2019-05-16 DIAGNOSIS — Z90.89 ACQUIRED ABSENCE OF OTHER ORGANS: Chronic | ICD-10-CM

## 2019-05-16 DIAGNOSIS — R93.3 ABNORMAL FINDINGS ON DIAGNOSTIC IMAGING OF OTHER PARTS OF DIGESTIVE TRACT: Chronic | ICD-10-CM

## 2019-05-20 DIAGNOSIS — K83.8 OTHER SPECIFIED DISEASES OF BILIARY TRACT: ICD-10-CM

## 2019-05-24 ENCOUNTER — APPOINTMENT (OUTPATIENT)
Dept: INFUSION THERAPY | Facility: CLINIC | Age: 69
End: 2019-05-24

## 2019-05-24 VITALS
TEMPERATURE: 96.4 F | SYSTOLIC BLOOD PRESSURE: 121 MMHG | DIASTOLIC BLOOD PRESSURE: 68 MMHG | RESPIRATION RATE: 18 BRPM | HEART RATE: 73 BPM

## 2019-06-21 ENCOUNTER — OUTPATIENT (OUTPATIENT)
Dept: OUTPATIENT SERVICES | Facility: HOSPITAL | Age: 69
LOS: 1 days | Discharge: HOME | End: 2019-06-21

## 2019-06-21 ENCOUNTER — APPOINTMENT (OUTPATIENT)
Dept: INFUSION THERAPY | Facility: CLINIC | Age: 69
End: 2019-06-21

## 2019-06-21 DIAGNOSIS — Z90.89 ACQUIRED ABSENCE OF OTHER ORGANS: Chronic | ICD-10-CM

## 2019-06-21 DIAGNOSIS — R93.3 ABNORMAL FINDINGS ON DIAGNOSTIC IMAGING OF OTHER PARTS OF DIGESTIVE TRACT: Chronic | ICD-10-CM

## 2019-06-21 DIAGNOSIS — R92.1 MAMMOGRAPHIC CALCIFICATION FOUND ON DIAGNOSTIC IMAGING OF BREAST: Chronic | ICD-10-CM

## 2019-06-21 DIAGNOSIS — Z98.890 OTHER SPECIFIED POSTPROCEDURAL STATES: Chronic | ICD-10-CM

## 2019-06-21 DIAGNOSIS — Z87.2 PERSONAL HISTORY OF DISEASES OF THE SKIN AND SUBCUTANEOUS TISSUE: Chronic | ICD-10-CM

## 2019-06-21 DIAGNOSIS — C50.812 MALIGNANT NEOPLASM OF OVERLAPPING SITES OF LEFT FEMALE BREAST: ICD-10-CM

## 2019-07-26 ENCOUNTER — LABORATORY RESULT (OUTPATIENT)
Age: 69
End: 2019-07-26

## 2019-07-26 ENCOUNTER — APPOINTMENT (OUTPATIENT)
Dept: HEMATOLOGY ONCOLOGY | Facility: CLINIC | Age: 69
End: 2019-07-26
Payer: MEDICARE

## 2019-07-26 ENCOUNTER — APPOINTMENT (OUTPATIENT)
Dept: INFUSION THERAPY | Facility: CLINIC | Age: 69
End: 2019-07-26
Payer: MEDICARE

## 2019-07-26 VITALS
HEART RATE: 92 BPM | SYSTOLIC BLOOD PRESSURE: 132 MMHG | BODY MASS INDEX: 25.88 KG/M2 | WEIGHT: 161 LBS | HEIGHT: 66 IN | RESPIRATION RATE: 16 BRPM | TEMPERATURE: 98.7 F | DIASTOLIC BLOOD PRESSURE: 76 MMHG

## 2019-07-26 PROCEDURE — 99213 OFFICE O/P EST LOW 20 MIN: CPT

## 2019-07-26 RX ORDER — ALPRAZOLAM 0.5 MG/1
0.5 TABLET ORAL
Qty: 30 | Refills: 0 | Status: DISCONTINUED | COMMUNITY
Start: 2018-06-06 | End: 2019-07-26

## 2019-07-26 NOTE — CONSULT LETTER
[Dear  ___] : Dear  [unfilled], [Courtesy Letter:] : I had the pleasure of seeing your patient, [unfilled], in my office today. [Please see my note below.] : Please see my note below. [Sincerely,] : Sincerely, [FreeTextEntry3] : Ernesto Lynne MD, PhD. [DrRupesh  ___] : Dr. RIDER [DrRupesh ___] : Dr. RIDER

## 2019-07-26 NOTE — HISTORY OF PRESENT ILLNESS
[de-identified] : 68 yo female is here for f/u visit and chemotherapy. She was recently diagnosed left breast cancer. She had screening mammogram in 06/2015 and it was normal. In Jan 2018 she noticed a lump in left breast. A diagnostic mammo on 1/18/2018 showed a  2.1  cm  mass In  the  region  of  patient's  palpable  abnormality.  No  other  suspicious  masses,  areas of  architectural  distortion  or  cluster  of  microcalcifications  in  either  breast.  Evaluation  of  the  left  axilla  demonstrates  normal-appearing  lymph  nodes.\par   \par Targeted  Left  Breast  Sonogram showed  \par In  the  region  of  the  patient's  palpable  abnormality  at  the  12:00  location  3  cm  from  the  nipple,  there  is  an  irregular  hypoechoic  mass  measuring  5.2  cm  x  3.2  cm  x  1.2  cm  which  corresponds  to  mammographic  findings. \par Other  masses  also  identified  within  the  left  breast  and  are  as  follows: 6:00  location  2  cm  from  the  nipple  measuring  4.1  cm  x  2.4  cm  x  1.4  cm,  9-10  o'clock  location  4  cm  from  the  nipple  measuring  2.1  cm  1.4  cm  x  0.4  cm  and  11:00  location  5  to  6  cm  from  the  nipple  measuring  1.3  cm  x  2.1  cm  x  0.4  cm.   An  ultrasound-guided  core  biopsy  of  the  each  mass  is  recommended\par \par She then had USG guided biopsies of those lesions. Pathology showed \par Site  A:  Left  breast,  11:00  N5-6. \par Intraductal  papilloma  containing  florid  duct  hyperplasia. \par   \par Site  B:  Left  breast  9-10  o'clock  N4. \par -Complex  sclerosing  lesion  (radial  scar)  containing  focal  florid  duct  hyperplasia.  \par -Atrophic  breast  tissue  associated  with  coarse  stromal  calcifications. \par  Site  C:  Left  Breast  6:00  N2 \par -Benign  breast  tissue  with  proliferative  type  fibrocystic  changes  including  dominant  dense  stromal  fibrosis,  focal  florid  duct  hyperplasia,  sclerosing  adenosis,  macrocyst  wall  fragments,  columnar  cell  change/hyperplasia  without  atypia,  and  rare  microcalcifications  present  in  benign  ducts. 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  Surgical  and  oncologic  follow-up  for  the  biopsy-proven  12:00  carcinoma  is  recommended.    Surgical  planning  for  excision  of  the  11:00  and  9-10  o'clock  high  risk  lesions  also  recommended\par \par On 2/13/2018, PET/CT  showed abnormal increased uptake is seen in multiple areas in the left breast. Max SUV 13.92 in the superior breast mass near surgical clip findings are \par consistent with biopsy proven malignancy in the superior mass. Remaining of the abnormal areas of increased uptake could be malignancy and/or postinflammatory nature. Correlate the findings with the biopsy reports.  There are PET positive left axillary lymph nodes with a max SUV 2.56. Findings could be postinflammatory and/or malignancy. \par No other areas of abnormal increased uptake is seen to suggest biologic tumor activity. \par \par On 2/15/18,  MRI breast was done. the report is still pending.\par \par The patient saw Dr. Hampton for surgical consultation. In light of large Her-2 positive tumor, she was given an option to have neoadjuvant chemotherapy followed by surgery. Monica is here with her sister for consultation of neoadjuvant systemic therapy. [de-identified] : Monica has completed neoadjuvant chemotherapy with TCHP x 6 times.  She responded well and her left breast mass has reduced significantly. She complains fatigue, \par She experienced side effects from the chemotherapy including diarrhea, poor appetite and insomnia. These symptoms have got better since chemo completed.\par \par On 7/27/18, she had PET/CT which showed No pathologic FDG uptake to suggest definite biologic tumor activity. All previously seen pathologic FDG uptake in the left breast has resolved. Previously seen mild FDG uptake in left axillary nodes has resolved. Findings are consistent with good treatment response. Mildly FDG avid portacaval lymph node, SUV max 5.7, is nonspecific but less likely to represent biologic tumor activity. This can be reassessed on follow-up. \par \par On 7/23/18, she had b/l breast MRI which showed Interval decrease in size of left breast 12:00 axis biopsy-proven invasive ductal carcinoma; currently it measures up to 1.0 cm, previously measuring up to 3.2 cm. \par \par 9/7/18:\par The patient is here today for followup and treatment. She underwent NLOC left lumpectomy and SLNB on 8/24/18. The pathology showed no evidence of residual disease and 2 SLN negative. She recovered well from surgery.\par She still has loose bowel movement sometimes. her appetite is improving.\par \par 10/19/2018\par Patient is here for a follow up prior to getting treatment with Herceptin and Perjeta. Her lumpectomy scars have healed up well and she has some mild tenderness. It assured that it will get better. She was concerned about irregularity at incision site and again we discussed the mammography findings which showed post lumpectomy changes ( 10/2018) and that it is scar tissue. \par She had mapping done for radiation, yet to start though. \par Her loose bowel movements have improved as well. \par She offers no other complaints.\par \par 12/28/18:\par The patient is here for followup visit. She completed radiation in 11/2018. She feels well and does not have new complains. In 10/2018, she had b/l dx mammo. There was no suspicious finding. In 11/2018, she had 2D echo. Her LVEF was 50-55%.\par \par 2/8/19: Pt is here for a follow-up visit. She is doing well on herceptin and perjeta. Last echo from Nov 2018 showed EF of 55-65%. No fresh complaints. Last US from Jan 2019 showed:\par Oval, hypoechoic, circumscribed, parallel mass in the right breast, 4:00  axis, 2 to 3 cm from the nipple measures 0.9 x 0.4 x 1.1 cm. This is not  significantly changed in size from 2/26/2018 where it measured 1.0 x 0.4  x 1.1 cm, and was previously biopsy proven to be benign.\par   Additional oval, circumscribed, hypoechoic mass in the right breast, 2:00  axis, 7 cm from the nipple measures 1.7 x 1.2 x 0.5 cm. This was likely  present on the prior MRI of 2/15/2018 (series 8, image 49) and is also  likely benign. Continued sonographic follow-up is recommended.\par   Benign post lumpectomy changes are seen in the left breast, 12:00 axis.  No suspicious solid or cystic masses are seen in the left breast.\par \par 4/12/19:\par The patient is here for f/u visit. She complains RUQ pain for a few weeks. She saw Dr. Manzo and had abdominal sonogram which showed Cholelithiasis without sonographic evidence of acute cholecystitis. CBD dilatation (11 mm), new since March 2016. Recommend correlation with laboratory tests. If indicated, MRCP can be obtained to exclude CBD \par obstruction or stone. She has been taking Cipro and Flagyl. She did not have any fever. She is given a referral for MRCP but she has not made an appointment yet.\par She has b/l dx mammo on 4/219. There was no suspicious finding.\par \par 7/26/2019: \par The patient is here for follow up. Since last visit , she had a cholecystectomy which showed chronic cholecystitis and cholelithiasis. Her bilirubin and liver enzymes normalized after surgery. She is also switched to venlafaxine for her anxiety. She is feeling much better. She is trying to exercise and regain energy. She complains left axillary pain at the site of SNLBx . She is also concerned about multiple lesions of her breasts that were proven to be benign in the past. She is due for a mammogram in October 2019.\par The patient also reported palpitations when she gets up quickly from a seating or a sleeping position , and bilateral ankle swelling by the end of the day . She will see her PMD to adjust her medications.

## 2019-07-26 NOTE — PHYSICAL EXAM
[Normal] : affect appropriate [Fully active, able to carry on all pre-disease performance without restriction] : Status 0 - Fully active, able to carry on all pre-disease performance without restriction [de-identified] : Left breast lumpectomy surgical incision healed up well. Mild tenderness. left SLNB scar healed well.

## 2019-07-26 NOTE — ASSESSMENT
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 Due in October 2019\par -- Flush port and blood work for CBC, CMP and tumor markers.\par -- She may call Dr. Hampton's office to schedule removal of port.\par -- RTO for followup visit in 3 months.\par \par The patient was seen and examined by Dr Lynne who agreed with the above plan

## 2019-08-06 ENCOUNTER — TRANSCRIPTION ENCOUNTER (OUTPATIENT)
Age: 69
End: 2019-08-06

## 2019-08-23 ENCOUNTER — RECORD ABSTRACTING (OUTPATIENT)
Age: 69
End: 2019-08-23

## 2019-08-23 ENCOUNTER — APPOINTMENT (OUTPATIENT)
Dept: INFUSION THERAPY | Facility: CLINIC | Age: 69
End: 2019-08-23

## 2019-08-23 DIAGNOSIS — Z78.9 OTHER SPECIFIED HEALTH STATUS: ICD-10-CM

## 2019-08-23 DIAGNOSIS — K44.9 DIAPHRAGMATIC HERNIA W/OUT OBSTRUCTION OR GANGRENE: ICD-10-CM

## 2019-08-23 RX ORDER — ASCORBIC ACID 500 MG
TABLET ORAL
Refills: 0 | Status: ACTIVE | COMMUNITY

## 2019-08-23 RX ORDER — MULTIVIT-MIN/FA/LYCOPEN/LUTEIN .4-300-25
TABLET ORAL
Refills: 0 | Status: ACTIVE | COMMUNITY

## 2019-08-23 RX ORDER — UBIQUINOL 100 MG
CAPSULE ORAL
Refills: 0 | Status: ACTIVE | COMMUNITY

## 2019-09-20 ENCOUNTER — OUTPATIENT (OUTPATIENT)
Dept: OUTPATIENT SERVICES | Facility: HOSPITAL | Age: 69
LOS: 1 days | Discharge: HOME | End: 2019-09-20

## 2019-09-20 ENCOUNTER — APPOINTMENT (OUTPATIENT)
Dept: INFUSION THERAPY | Facility: CLINIC | Age: 69
End: 2019-09-20

## 2019-09-20 DIAGNOSIS — R93.3 ABNORMAL FINDINGS ON DIAGNOSTIC IMAGING OF OTHER PARTS OF DIGESTIVE TRACT: Chronic | ICD-10-CM

## 2019-09-20 DIAGNOSIS — C50.412 MALIGNANT NEOPLASM OF UPPER-OUTER QUADRANT OF LEFT FEMALE BREAST: ICD-10-CM

## 2019-09-20 DIAGNOSIS — Z90.89 ACQUIRED ABSENCE OF OTHER ORGANS: Chronic | ICD-10-CM

## 2019-09-20 DIAGNOSIS — R92.1 MAMMOGRAPHIC CALCIFICATION FOUND ON DIAGNOSTIC IMAGING OF BREAST: Chronic | ICD-10-CM

## 2019-09-20 DIAGNOSIS — Z98.890 OTHER SPECIFIED POSTPROCEDURAL STATES: Chronic | ICD-10-CM

## 2019-09-20 DIAGNOSIS — Z87.2 PERSONAL HISTORY OF DISEASES OF THE SKIN AND SUBCUTANEOUS TISSUE: Chronic | ICD-10-CM

## 2019-09-20 RX ORDER — INFLUENZA VIRUS VACCINE 15; 15; 15; 15 UG/.5ML; UG/.5ML; UG/.5ML; UG/.5ML
0.5 SUSPENSION INTRAMUSCULAR ONCE
Refills: 0 | Status: COMPLETED | OUTPATIENT
Start: 2019-09-20 | End: 2019-09-20

## 2019-09-20 RX ADMIN — INFLUENZA VIRUS VACCINE 0.5 MILLILITER(S): 15; 15; 15; 15 SUSPENSION INTRAMUSCULAR at 10:33

## 2019-09-23 DIAGNOSIS — Z23 ENCOUNTER FOR IMMUNIZATION: ICD-10-CM

## 2019-10-08 ENCOUNTER — FORM ENCOUNTER (OUTPATIENT)
Age: 69
End: 2019-10-08

## 2019-10-09 ENCOUNTER — OUTPATIENT (OUTPATIENT)
Dept: OUTPATIENT SERVICES | Facility: HOSPITAL | Age: 69
LOS: 1 days | Discharge: HOME | End: 2019-10-09
Payer: MEDICARE

## 2019-10-09 DIAGNOSIS — Z98.890 OTHER SPECIFIED POSTPROCEDURAL STATES: Chronic | ICD-10-CM

## 2019-10-09 DIAGNOSIS — Z87.2 PERSONAL HISTORY OF DISEASES OF THE SKIN AND SUBCUTANEOUS TISSUE: Chronic | ICD-10-CM

## 2019-10-09 DIAGNOSIS — Z90.89 ACQUIRED ABSENCE OF OTHER ORGANS: Chronic | ICD-10-CM

## 2019-10-09 DIAGNOSIS — R92.1 MAMMOGRAPHIC CALCIFICATION FOUND ON DIAGNOSTIC IMAGING OF BREAST: Chronic | ICD-10-CM

## 2019-10-09 DIAGNOSIS — R93.3 ABNORMAL FINDINGS ON DIAGNOSTIC IMAGING OF OTHER PARTS OF DIGESTIVE TRACT: Chronic | ICD-10-CM

## 2019-10-09 DIAGNOSIS — R92.8 OTHER ABNORMAL AND INCONCLUSIVE FINDINGS ON DIAGNOSTIC IMAGING OF BREAST: ICD-10-CM

## 2019-10-09 PROCEDURE — G0279: CPT | Mod: 26

## 2019-10-09 PROCEDURE — 77066 DX MAMMO INCL CAD BI: CPT | Mod: 26

## 2019-10-09 PROCEDURE — 76642 ULTRASOUND BREAST LIMITED: CPT | Mod: 26,50

## 2019-10-15 ENCOUNTER — APPOINTMENT (OUTPATIENT)
Dept: BREAST CENTER | Facility: CLINIC | Age: 69
End: 2019-10-15

## 2019-10-21 NOTE — H&P PST ADULT - NS PRO FEM REPRO PAP RESULTS
Brian Ferrer  Male, 63 year old, 1956  Gender Pronouns:   Not Documented  Phone:   [phone] (M)  Last Weight:   (!) 161.5 kg  PCP:   Frederic Kaplan DO  Allergies:   Penicillins,      Lisinopril  Primary Ins:   [employer] M  MRN: [id number]  Patient Portal:   Active  Last Logon:   10/02/19  Next Appt:   With Family Practice  10/29/2019 at 8:20 AM  Last Appt With Me:   Patient did not schedule with Dr. Navarro   Received: Today   Message Contents   Cindy Navarro MD; EMMANUEL Navarro Gastro Staff Nurse Msg Pool Gh/Cc/Wfb/; ANIL Wells Fp Nurse Msg Pool             FYI   Patient is due for a repeat colonoscopy. I spoke with the patient and he is not scheduling at this time.         Thanks,   GI Preadmit         unknown

## 2019-10-27 ENCOUNTER — FORM ENCOUNTER (OUTPATIENT)
Age: 69
End: 2019-10-27

## 2019-10-28 ENCOUNTER — OUTPATIENT (OUTPATIENT)
Dept: OUTPATIENT SERVICES | Facility: HOSPITAL | Age: 69
LOS: 1 days | Discharge: HOME | End: 2019-10-28
Payer: MEDICARE

## 2019-10-28 ENCOUNTER — RESULT REVIEW (OUTPATIENT)
Age: 69
End: 2019-10-28

## 2019-10-28 DIAGNOSIS — R92.1 MAMMOGRAPHIC CALCIFICATION FOUND ON DIAGNOSTIC IMAGING OF BREAST: Chronic | ICD-10-CM

## 2019-10-28 DIAGNOSIS — R93.3 ABNORMAL FINDINGS ON DIAGNOSTIC IMAGING OF OTHER PARTS OF DIGESTIVE TRACT: Chronic | ICD-10-CM

## 2019-10-28 DIAGNOSIS — Z98.890 OTHER SPECIFIED POSTPROCEDURAL STATES: Chronic | ICD-10-CM

## 2019-10-28 DIAGNOSIS — Z90.89 ACQUIRED ABSENCE OF OTHER ORGANS: Chronic | ICD-10-CM

## 2019-10-28 DIAGNOSIS — Z87.2 PERSONAL HISTORY OF DISEASES OF THE SKIN AND SUBCUTANEOUS TISSUE: Chronic | ICD-10-CM

## 2019-10-28 PROCEDURE — 19083 BX BREAST 1ST LESION US IMAG: CPT | Mod: RT

## 2019-10-28 PROCEDURE — 19081 BX BREAST 1ST LESION STRTCTC: CPT | Mod: LT

## 2019-10-28 PROCEDURE — 88305 TISSUE EXAM BY PATHOLOGIST: CPT | Mod: 26

## 2019-10-28 PROCEDURE — 77065 DX MAMMO INCL CAD UNI: CPT | Mod: 26,RT

## 2019-10-29 LAB — SURGICAL PATHOLOGY STUDY: SIGNIFICANT CHANGE UP

## 2019-11-01 DIAGNOSIS — R92.0 MAMMOGRAPHIC MICROCALCIFICATION FOUND ON DIAGNOSTIC IMAGING OF BREAST: ICD-10-CM

## 2019-11-01 DIAGNOSIS — R92.1 MAMMOGRAPHIC CALCIFICATION FOUND ON DIAGNOSTIC IMAGING OF BREAST: ICD-10-CM

## 2019-11-01 DIAGNOSIS — N60.21 FIBROADENOSIS OF RIGHT BREAST: ICD-10-CM

## 2019-11-01 DIAGNOSIS — N60.31 FIBROSCLEROSIS OF RIGHT BREAST: ICD-10-CM

## 2019-11-01 DIAGNOSIS — D24.2 BENIGN NEOPLASM OF LEFT BREAST: ICD-10-CM

## 2019-11-05 ENCOUNTER — APPOINTMENT (OUTPATIENT)
Dept: BREAST CENTER | Facility: CLINIC | Age: 69
End: 2019-11-05
Payer: MEDICARE

## 2019-11-05 VITALS
SYSTOLIC BLOOD PRESSURE: 118 MMHG | WEIGHT: 161 LBS | DIASTOLIC BLOOD PRESSURE: 74 MMHG | BODY MASS INDEX: 25.88 KG/M2 | HEIGHT: 66 IN | TEMPERATURE: 98 F

## 2019-11-05 PROCEDURE — 99213 OFFICE O/P EST LOW 20 MIN: CPT

## 2019-11-05 RX ORDER — FLUOXETINE HYDROCHLORIDE 20 MG/1
20 CAPSULE ORAL
Refills: 0 | Status: DISCONTINUED | COMMUNITY
End: 2019-11-05

## 2019-11-05 RX ORDER — DIPHENOXYLATE HYDROCHLORIDE AND ATROPINE SULFATE 2.5; .025 MG/1; MG/1
2.5-0.025 TABLET ORAL
Qty: 30 | Refills: 0 | Status: DISCONTINUED | COMMUNITY
Start: 2018-03-15 | End: 2019-11-05

## 2019-11-05 RX ORDER — ALPRAZOLAM 0.5 MG/1
0.5 TABLET ORAL
Refills: 0 | Status: DISCONTINUED | COMMUNITY
End: 2019-11-05

## 2019-11-05 RX ORDER — VENLAFAXINE 37.5 MG/1
TABLET ORAL
Refills: 0 | Status: ACTIVE | COMMUNITY

## 2019-11-05 NOTE — REASON FOR VISIT
[Consultation] : a consultation visit [FreeTextEntry1] : Right breast radial scar recommended for surgical excision; PMD is Dr. ERLIN Bush.

## 2019-11-05 NOTE — HISTORY OF PRESENT ILLNESS
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  Mother had pancreatic cancer.  Brother has prostate cancer.  No BRCA testing in family.  \par \par Status post insertion of a mediport on 3/5/18.   Status post re-placement of mediport 3/29/18 for neoadjuvant chemotherapy.  Completed Herceptin and Perjeta.  \par \par Status post Left NLOC of three areas and SLNB 8/24/18 demonstrating 0/2 (-); 3. Breast, left 12 o'clock mass, needle localized lumpectomy: Sclerosing large duct papilloma associated with florid duct hyperplasia and coarse stromal calcifications, radial sclerosing lesion (radial scar).  Diffuse histologic changes consistent with prior systemic therapy effect.  Focal healing prior biopsy site change with no residual carcinoma identified.  AJCC 8th Edition Pathologic Stage: ypT0, p(sn)N0, pMx, pCR (pathologic complete response to neoadjuvant therapy).\par Breast, left 11 o'clock mass, needle localized lumpectomy: Radial sclerosing lesion (radial scar) located adjacent to healing prior biopsy site changes.\par Breast, left 9 o'clock mass, needle localized lumpectomy: Sclerosing small duct papilloma containing florid duct hyperplasia with adjacent healing prior biopsy site changes.\par \par \par Left breast fragments of hyalinized fibroadenoma containing coarse stromal calcifications with adjacent benign atrophic fatty breast tissue on stereotactic core biopsy 10/28/19; Posterior (tophat).  Findings are benign concordant and 6-month followup left breast mammogram recommended.\par \par Right breast radial sclerosing lesion on ultrasound guided core biopsy 10/28/19; 2:00 N2, 8 mm (top hat).  Surgical excision recommended.

## 2019-11-05 NOTE — ASSESSMENT
[FreeTextEntry1] : Monica has a h/o left breast cancer in 2018.  She recently underwent right breast NC biopsy and the pathology demonstrated a radial sclerosing lesion.  Calcifications in the left breast are likely related to a hyalinized fibroadenoma requiring short-term f/up.  She also states that a sebaceous cyst at the 10:00 position in the right breast was recently infected which resolved with antibiotics.  Monica would also like her mediport removed.  On examination, there is a <1cm sebaceous cyst healed from recent infection.  There is no other palpable mass, axillary lymphadenopathy, nipple discharge or inversion. I reviewed her recent imaging. \par \par We will schedule her for a right breast lumpectomy with needle loc, right breast mass excision, and removal of mediport.  All risks and benefits were explained.\par \par I spent a total of 15 minutes of face to face time with this patient, greater than 50% of which was spent in counseling and/or coordination of care.  All of her questions were appropriately answered.\par

## 2019-11-05 NOTE — DATA REVIEWED
[FreeTextEntry1] : EXAM: US BREAST LIMITED BI \par EXAM: MG MAMMO DIAG W ZACH BI# \par PROCEDURE DATE: 10/09/2019 \par INTERPRETATION: CLINICAL HISTORY / REASON FOR EXAM: Short interval \par follow-up for probably benign bilateral breast masses, left breast \par calcifications, and status post left lumpectomy. Personal history of left \par breast invasive ductal carcinoma in January 2018. Patient also reports \par breast cancer in a maternal aunt. \par The patient reports her last clinical breast examination was performed \par within the past year. \par Comparison is made to the prior studies dated back to 10/9/2018. \par FINDINGS: \par Mammogram- \par Full field CC and MLO views of the bilateral breasts with tomosynthesis. \par Left breast spot magnification views in the CC and ML projections. \par Computer-aided detection was utilized in the interpretation of this \par examination. \par Breast composition: There are scattered areas of fibroglandular density. \par Left: \par Status post post left lumpectomy with stable area of postsurgical \par architectural distortion. Previously seen postoperative seroma/fat necrosis \par has decreased in size and density. Diffuse skin thickening throughout the \par left breast, consistent with post radiation changes. Grouped heterogeneous \par calcifications in a linear distribution posterior and inferior to the \par postsurgical site have slightly increased since the prior study. \par Stereotactic guided biopsy is recommended. \par Right: \par Stable benign coarse heterogeneous calcifications. Stable nodular \par asymmetries in the right breast. Stable circumscribed mass within the medial \par right breast. Port in the right axillary soft tissues. \par Sonogram- \par Targeted bilateral breast ultrasound was performed. \par Left: \par At 12:00, 4-5 cm from the nipple, there is a stable probably benign area of \par developing fat necrosis adjacent to the lumpectomy site measuring 0.9 x 0.9 \par x 0.5 cm. \par Right: \par At 2:00, 2 to 3 cm from the nipple, there is an irregular hypoechoic mass \par measuring 0.6 x 0.8 x 0.4 cm, not definitely seen on prior imaging. \par Ultrasound-guided biopsy is recommended \par At 2:00, 7 cm from the nipple, there is a stable probably benign oval \par circumscribed hypoechoic mass measuring 1.7 x 0.8 x 0.7 cm. \par At 4:00, 2 to 3 cm from the nipple, there is a stable probably benign oval \par circumscribed mass measuring 1.2 x 0.8 x 0.3 cm. \par IMPRESSION: \par 1. Interval increase in the left breast calcifications as above. \par Stereotactic guided biopsy is recommended. \par 2. Right breast mass at the 2:00 axis, 2 to 3 cm from the nipple. \par Ultrasound guided biopsy is recommended. \par 3. Additional stable probably benign bilateral breast masses as above. \par RECOMMENDATION: Stereotactic guided biopsy of the left breast and \par ultrasound-guided biopsy of the right breast. \par BI-RADS Category 4: Suspicious \par Findings and recommendations were discussed with the patient and LINETTE Logan on 10/9/2019. \par FAUSTO WELLINGTON M.D., RESIDENT RADIOLOGIST \par This document has been electronically signed. \par HALEY SHARP M.D., ATTENDING RADIOLOGIST \par This document has been electronically signed. Oct 10 2019 8:49AM \par \par \par \par \par \par EXAM: US BX BRST 1ST RT SISC \par EXAM: MG STEREO BX 1ST LT SISC \par *** ADDENDUM 10/29/2019 *** \par Pathology: \par Site 1, left breast: \par -Fragments of hyalinized fibroadenoma containing coarse stromal \par calcifications with adjacent benign atrophic fatty breast tissue. \par This is benign, concordant histology. Follow-up left breast mammogram is \par recommended in 6 months. \par Site 2, right breast: \par -Radial scar. \par -Benign breast tissue with proliferative type fibrocystic changes. \par This is benign, high risk, concordant histology. Surgical excision is \par recommended. \par Findings and recommendations were discussed with the patient and LINETTE Mcadams, on 10/29/2019. \par *** END OF ADDENDUM 10/29/2019 *** \par PROCEDURE DATE: 10/28/2019 \par INTERPRETATION: Clinical history: Left breast calcifications, right breast \par mass. \par Procedures: Left breast stereotactic vacuum assisted core biopsy, right \par breast ultrasound guided spring loaded core biopsy, and post clip placement \par two view bilateral mammogram. \par Previous films and reports were reviewed. The procedure, its risks, \par benefits, and alternatives were explained to the patient, who expressed \par understanding and gave informed written and verbal consent. A time-out, \par which included the patient's full name, date of birth, description of the \par expected procedure and procedure site, was performed immediately before the \par procedure. \par Site 1, left breast: \par A superior approach was selected for the procedure. Using the usual sterile \par technique and stereotactic guidance, the previously described calcifications \par in the left breast were biopsied using a 9 gauge vacuum-assisted device. A \par single pass was made and 4 samples were collected. Specimen radiography \par demonstrated the calcifications to be contained within the specimen. A \par ([employer] top-hat) localizing clip was then placed into the biopsy cavity. \par Hemostasis was obtained and a sterile dressing was applied. \par Site 2, right breast: \par Using the usual sterile technique and ultrasound guidance, the previously \par described mass in the right breast, 2:00 axis, 2 to 3 cm from the nipple, \par was biopsied utilizing a 14-gauge automated Bard core biopsy device with a \par 13-gauge introducer sheath. 5 samples were collected. A marking clip \par ([employer] top-hat) was deployed under ultrasound guidance. Hemostasis was \par obtained and a sterile dressing was applied. \par A bilateral mammogram was performed. This demonstrates the right breast clip \par to be appropriately positioned. The left breast clip is superiorly displaced \par x 1.1 cm. \par The patient tolerated the procedure well and left the department in good \par condition with written discharge instructions. \par IMPRESSION: \par Successful bilateral breast biopsies. \par Pathology: Pending, to be reported as an addendum. \par ***Please see the addendum at the top of this report. It may contain \par additional important information or changes.**** \par HALEY SHARP M.D., ATTENDING RADIOLOGIST \par This document has been electronically signed. Oct 29 2019 10:06AM \par Addend: HALEY SHARP M.D., ATTENDING RADIOLOGIST \par This addendum was electronically signed on: Oct 29 2019 4:53PM. \par

## 2019-11-11 ENCOUNTER — LABORATORY RESULT (OUTPATIENT)
Age: 69
End: 2019-11-11

## 2019-11-11 ENCOUNTER — APPOINTMENT (OUTPATIENT)
Dept: HEMATOLOGY ONCOLOGY | Facility: CLINIC | Age: 69
End: 2019-11-11
Payer: MEDICARE

## 2019-11-11 ENCOUNTER — OUTPATIENT (OUTPATIENT)
Dept: OUTPATIENT SERVICES | Facility: HOSPITAL | Age: 69
LOS: 1 days | Discharge: HOME | End: 2019-11-11

## 2019-11-11 ENCOUNTER — APPOINTMENT (OUTPATIENT)
Dept: INFUSION THERAPY | Facility: CLINIC | Age: 69
End: 2019-11-11

## 2019-11-11 VITALS
SYSTOLIC BLOOD PRESSURE: 141 MMHG | BODY MASS INDEX: 28.61 KG/M2 | HEART RATE: 78 BPM | WEIGHT: 178 LBS | TEMPERATURE: 97.6 F | HEIGHT: 66 IN | DIASTOLIC BLOOD PRESSURE: 82 MMHG

## 2019-11-11 DIAGNOSIS — Z98.890 OTHER SPECIFIED POSTPROCEDURAL STATES: Chronic | ICD-10-CM

## 2019-11-11 DIAGNOSIS — R93.3 ABNORMAL FINDINGS ON DIAGNOSTIC IMAGING OF OTHER PARTS OF DIGESTIVE TRACT: Chronic | ICD-10-CM

## 2019-11-11 DIAGNOSIS — Z90.89 ACQUIRED ABSENCE OF OTHER ORGANS: Chronic | ICD-10-CM

## 2019-11-11 DIAGNOSIS — Z87.2 PERSONAL HISTORY OF DISEASES OF THE SKIN AND SUBCUTANEOUS TISSUE: Chronic | ICD-10-CM

## 2019-11-11 DIAGNOSIS — R92.1 MAMMOGRAPHIC CALCIFICATION FOUND ON DIAGNOSTIC IMAGING OF BREAST: Chronic | ICD-10-CM

## 2019-11-11 DIAGNOSIS — Z85.3 PERSONAL HISTORY OF MALIGNANT NEOPLASM OF BREAST: ICD-10-CM

## 2019-11-11 PROCEDURE — 99214 OFFICE O/P EST MOD 30 MIN: CPT

## 2019-11-13 PROBLEM — Z85.3 HISTORY OF MALIGNANT NEOPLASM OF BREAST: Status: RESOLVED | Noted: 2018-08-24 | Resolved: 2019-11-13

## 2019-11-13 NOTE — PHYSICAL EXAM
[Fully active, able to carry on all pre-disease performance without restriction] : Status 0 - Fully active, able to carry on all pre-disease performance without restriction [Normal] : grossly intact [de-identified] : Left breast lumpectomy surgical incision healed up well. Mild tenderness. left SLNB scar healed well.

## 2019-11-13 NOTE — RESULTS/DATA
[FreeTextEntry1] : 10/29/19\par Final Diagnosis\par 1. Breast, left posterior calcifications, stereotactic guided\par vacuum assisted needle core biopsies:\par - Fragments of hyalinized fibroadenoma containing coarse stromal\par calcifications with adjacent benign atrophic fatty breast tissue.\par - Radiographic/pathologic correlation with the specimen's\par digital image viewed on PACS is performed.\par \par 2. Breast, right slightly irregular mass, ultrasound guided\par needle core biopsies:\par - Radial sclerosing lesion (radial scar).\par - Benign breast tissue with proliferative type fibrocystic\par changes including dominant nodular sclerosing adenosis,\par usual type duct hyperplasia, stromal fibrosis, apocrine\par metaplasia, and microcalcifications present in benign ducts.\par

## 2019-11-13 NOTE — ASSESSMENT
[FreeTextEntry1] : 1. Poorly differentiated IDC of the left breast, ER/CA negative and Her-2 positive, with large tumor mass and FDG avid left axillary adenopathy, clinical stage T3N1, s/p neoadjuvant chemotherapy followed by left lumpectomy and SLNB, achieved pCR - followed by  adjuvant Anti- her 2 treatments\par 1. Right breast  radial sclerosing lesion.\par  \par Recommendation:\par -- Flush port and blood work for CBC, CMP and tumor markers.\par -- Reviewed dx ammo, US and biopsy result. The right breast biopsy showed  radial sclerosing lesion which requires surgical excision.  She is scheduled for Right lumpectomy and port removal on 12/11/19. \par -- RTO for followup visit in 6 months.\par \par The patient was seen and examined by Dr Lynne who agreed with the above plan

## 2019-11-13 NOTE — HISTORY OF PRESENT ILLNESS
[de-identified] : 68 yo female is here for f/u visit and chemotherapy. She was recently diagnosed left breast cancer. She had screening mammogram in 06/2015 and it was normal. In Jan 2018 she noticed a lump in left breast. A diagnostic mammo on 1/18/2018 showed a  2.1  cm  mass In  the  region  of  patient's  palpable  abnormality.  No  other  suspicious  masses,  areas of  architectural  distortion  or  cluster  of  microcalcifications  in  either  breast.  Evaluation  of  the  left  axilla  demonstrates  normal-appearing  lymph  nodes.\par   \par Targeted  Left  Breast  Sonogram showed  \par In  the  region  of  the  patient's  palpable  abnormality  at  the  12:00  location  3  cm  from  the  nipple,  there  is  an  irregular  hypoechoic  mass  measuring  5.2  cm  x  3.2  cm  x  1.2  cm  which  corresponds  to  mammographic  findings. \par Other  masses  also  identified  within  the  left  breast  and  are  as  follows: 6:00  location  2  cm  from  the  nipple  measuring  4.1  cm  x  2.4  cm  x  1.4  cm,  9-10  o'clock  location  4  cm  from  the  nipple  measuring  2.1  cm  1.4  cm  x  0.4  cm  and  11:00  location  5  to  6  cm  from  the  nipple  measuring  1.3  cm  x  2.1  cm  x  0.4  cm.   An  ultrasound-guided  core  biopsy  of  the  each  mass  is  recommended\par \par She then had USG guided biopsies of those lesions. Pathology showed \par Site  A:  Left  breast,  11:00  N5-6. \par Intraductal  papilloma  containing  florid  duct  hyperplasia. \par   \par Site  B:  Left  breast  9-10  o'clock  N4. \par -Complex  sclerosing  lesion  (radial  scar)  containing  focal  florid  duct  hyperplasia.  \par -Atrophic  breast  tissue  associated  with  coarse  stromal  calcifications. \par  Site  C:  Left  Breast  6:00  N2 \par -Benign  breast  tissue  with  proliferative  type  fibrocystic  changes  including  dominant  dense  stromal  fibrosis,  focal  florid  duct  hyperplasia,  sclerosing  adenosis,  macrocyst  wall  fragments,  columnar  cell  change/hyperplasia  without  atypia,  and  rare  microcalcifications  present  in  benign  ducts. \par   \par Site  D:  Left  breast  12:00  N3 \par -Invasive  poorly  differentiated  duct  carcinoma  with  focal  necrosis, ER negative, IN <1%, Her-2 positive 3+ by IHC and Ki 67 50%.\par   \par Recommendation:  Surgical  excision.  Surgical  and  oncologic  follow-up  for  the  biopsy-proven  12:00  carcinoma  is  recommended.    Surgical  planning  for  excision  of  the  11:00  and  9-10  o'clock  high  risk  lesions  also  recommended\par \par On 2/13/2018, PET/CT  showed abnormal increased uptake is seen in multiple areas in the left breast. Max SUV 13.92 in the superior breast mass near surgical clip findings are \par consistent with biopsy proven malignancy in the superior mass. Remaining of the abnormal areas of increased uptake could be malignancy and/or postinflammatory nature. Correlate the findings with the biopsy reports.  There are PET positive left axillary lymph nodes with a max SUV 2.56. Findings could be postinflammatory and/or malignancy. \par No other areas of abnormal increased uptake is seen to suggest biologic tumor activity. \par \par On 2/15/18,  MRI breast was done. the report is still pending.\par \par The patient saw Dr. Hampton for surgical consultation. In light of large Her-2 positive tumor, she was given an option to have neoadjuvant chemotherapy followed by surgery. Monica is here with her sister for consultation of neoadjuvant systemic therapy. [de-identified] : Monica has completed neoadjuvant chemotherapy with TCHP x 6 times.  She responded well and her left breast mass has reduced significantly. She complains fatigue, \par She experienced side effects from the chemotherapy including diarrhea, poor appetite and insomnia. These symptoms have got better since chemo completed.\par \par On 7/27/18, she had PET/CT which showed No pathologic FDG uptake to suggest definite biologic tumor activity. All previously seen pathologic FDG uptake in the left breast has resolved. Previously seen mild FDG uptake in left axillary nodes has resolved. Findings are consistent with good treatment response. Mildly FDG avid portacaval lymph node, SUV max 5.7, is nonspecific but less likely to represent biologic tumor activity. This can be reassessed on follow-up. \par \par On 7/23/18, she had b/l breast MRI which showed Interval decrease in size of left breast 12:00 axis biopsy-proven invasive ductal carcinoma; currently it measures up to 1.0 cm, previously measuring up to 3.2 cm. \par \par 9/7/18:\par The patient is here today for followup and treatment. She underwent NLOC left lumpectomy and SLNB on 8/24/18. The pathology showed no evidence of residual disease and 2 SLN negative. She recovered well from surgery.\par She still has loose bowel movement sometimes. her appetite is improving.\par \par 10/19/2018\par Patient is here for a follow up prior to getting treatment with Herceptin and Perjeta. Her lumpectomy scars have healed up well and she has some mild tenderness. It assured that it will get better. She was concerned about irregularity at incision site and again we discussed the mammography findings which showed post lumpectomy changes ( 10/2018) and that it is scar tissue. \par She had mapping done for radiation, yet to start though. \par Her loose bowel movements have improved as well. \par She offers no other complaints.\par \par 12/28/18:\par The patient is here for followup visit. She completed radiation in 11/2018. She feels well and does not have new complains. In 10/2018, she had b/l dx mammo. There was no suspicious finding. In 11/2018, she had 2D echo. Her LVEF was 50-55%.\par \par 2/8/19: Pt is here for a follow-up visit. She is doing well on herceptin and perjeta. Last echo from Nov 2018 showed EF of 55-65%. No fresh complaints. Last US from Jan 2019 showed:\par Oval, hypoechoic, circumscribed, parallel mass in the right breast, 4:00  axis, 2 to 3 cm from the nipple measures 0.9 x 0.4 x 1.1 cm. This is not  significantly changed in size from 2/26/2018 where it measured 1.0 x 0.4  x 1.1 cm, and was previously biopsy proven to be benign.\par   Additional oval, circumscribed, hypoechoic mass in the right breast, 2:00  axis, 7 cm from the nipple measures 1.7 x 1.2 x 0.5 cm. This was likely  present on the prior MRI of 2/15/2018 (series 8, image 49) and is also  likely benign. Continued sonographic follow-up is recommended.\par   Benign post lumpectomy changes are seen in the left breast, 12:00 axis.  No suspicious solid or cystic masses are seen in the left breast.\par \par 4/12/19:\par The patient is here for f/u visit. She complains RUQ pain for a few weeks. She saw Dr. Manzo and had abdominal sonogram which showed Cholelithiasis without sonographic evidence of acute cholecystitis. CBD dilatation (11 mm), new since March 2016. Recommend correlation with laboratory tests. If indicated, MRCP can be obtained to exclude CBD \par obstruction or stone. She has been taking Cipro and Flagyl. She did not have any fever. She is given a referral for MRCP but she has not made an appointment yet.\par She has b/l dx mammo on 4/219. There was no suspicious finding.\par \par 7/26/2019: \par The patient is here for follow up. Since last visit , she had a cholecystectomy which showed chronic cholecystitis and cholelithiasis. Her bilirubin and liver enzymes normalized after surgery. She is also switched to venlafaxine for her anxiety. She is feeling much better. She is trying to exercise and regain energy. She complains left axillary pain at the site of SNLBx . She is also concerned about multiple lesions of her breasts that were proven to be benign in the past. She is due for a mammogram in October 2019.\par The patient also reported palpitations when she gets up quickly from a seating or a sleeping position , and bilateral ankle swelling by the end of the day . She will see her PMD to adjust her medications. \par \par 11/11/19\par Patient is here today for follow up visit.  She had a mammogram 10/2019 which showed calcification in left breast and a radial sclerosing lesion on biopsy of right breast.  She will have a lumpectomy on right breast for this high risk benign lesion and port removal on same day-12/11/19 by Dr. Hampton.  She offers no further complaints.

## 2019-11-13 NOTE — CONSULT LETTER
[Dear  ___] : Dear  [unfilled], [Please see my note below.] : Please see my note below. [Courtesy Letter:] : I had the pleasure of seeing your patient, [unfilled], in my office today. [DrRupesh  ___] : Dr. RIDER [Sincerely,] : Sincerely, [DrRupesh ___] : Dr. RIDER [FreeTextEntry3] : Ernesto Lynne MD, PhD.

## 2019-12-04 ENCOUNTER — OUTPATIENT (OUTPATIENT)
Dept: OUTPATIENT SERVICES | Facility: HOSPITAL | Age: 69
LOS: 1 days | Discharge: HOME | End: 2019-12-04
Payer: MEDICARE

## 2019-12-04 VITALS
DIASTOLIC BLOOD PRESSURE: 63 MMHG | HEART RATE: 76 BPM | RESPIRATION RATE: 17 BRPM | HEIGHT: 67 IN | SYSTOLIC BLOOD PRESSURE: 146 MMHG | OXYGEN SATURATION: 96 % | WEIGHT: 181.66 LBS | TEMPERATURE: 98 F

## 2019-12-04 DIAGNOSIS — Z90.89 ACQUIRED ABSENCE OF OTHER ORGANS: Chronic | ICD-10-CM

## 2019-12-04 DIAGNOSIS — Z90.49 ACQUIRED ABSENCE OF OTHER SPECIFIED PARTS OF DIGESTIVE TRACT: Chronic | ICD-10-CM

## 2019-12-04 DIAGNOSIS — R93.3 ABNORMAL FINDINGS ON DIAGNOSTIC IMAGING OF OTHER PARTS OF DIGESTIVE TRACT: Chronic | ICD-10-CM

## 2019-12-04 DIAGNOSIS — Z01.818 ENCOUNTER FOR OTHER PREPROCEDURAL EXAMINATION: ICD-10-CM

## 2019-12-04 DIAGNOSIS — Z98.890 OTHER SPECIFIED POSTPROCEDURAL STATES: Chronic | ICD-10-CM

## 2019-12-04 DIAGNOSIS — C50.919 MALIGNANT NEOPLASM OF UNSPECIFIED SITE OF UNSPECIFIED FEMALE BREAST: ICD-10-CM

## 2019-12-04 DIAGNOSIS — N64.89 OTHER SPECIFIED DISORDERS OF BREAST: ICD-10-CM

## 2019-12-04 DIAGNOSIS — Z87.2 PERSONAL HISTORY OF DISEASES OF THE SKIN AND SUBCUTANEOUS TISSUE: Chronic | ICD-10-CM

## 2019-12-04 DIAGNOSIS — R92.1 MAMMOGRAPHIC CALCIFICATION FOUND ON DIAGNOSTIC IMAGING OF BREAST: Chronic | ICD-10-CM

## 2019-12-04 LAB
ALBUMIN SERPL ELPH-MCNC: 4.7 G/DL — SIGNIFICANT CHANGE UP (ref 3.5–5.2)
ALP SERPL-CCNC: 75 U/L — SIGNIFICANT CHANGE UP (ref 30–115)
ALT FLD-CCNC: 35 U/L — SIGNIFICANT CHANGE UP (ref 0–41)
ANION GAP SERPL CALC-SCNC: 15 MMOL/L — HIGH (ref 7–14)
APTT BLD: 29.6 SEC — SIGNIFICANT CHANGE UP (ref 27–39.2)
AST SERPL-CCNC: 27 U/L — SIGNIFICANT CHANGE UP (ref 0–41)
BASOPHILS # BLD AUTO: 0.03 K/UL — SIGNIFICANT CHANGE UP (ref 0–0.2)
BASOPHILS NFR BLD AUTO: 0.4 % — SIGNIFICANT CHANGE UP (ref 0–1)
BILIRUB SERPL-MCNC: 0.4 MG/DL — SIGNIFICANT CHANGE UP (ref 0.2–1.2)
BUN SERPL-MCNC: 18 MG/DL — SIGNIFICANT CHANGE UP (ref 10–20)
CALCIUM SERPL-MCNC: 10 MG/DL — SIGNIFICANT CHANGE UP (ref 8.5–10.1)
CHLORIDE SERPL-SCNC: 103 MMOL/L — SIGNIFICANT CHANGE UP (ref 98–110)
CO2 SERPL-SCNC: 24 MMOL/L — SIGNIFICANT CHANGE UP (ref 17–32)
CREAT SERPL-MCNC: 0.7 MG/DL — SIGNIFICANT CHANGE UP (ref 0.7–1.5)
EOSINOPHIL # BLD AUTO: 0.13 K/UL — SIGNIFICANT CHANGE UP (ref 0–0.7)
EOSINOPHIL NFR BLD AUTO: 1.9 % — SIGNIFICANT CHANGE UP (ref 0–8)
GLUCOSE SERPL-MCNC: 99 MG/DL — SIGNIFICANT CHANGE UP (ref 70–99)
HCT VFR BLD CALC: 44 % — SIGNIFICANT CHANGE UP (ref 37–47)
HGB BLD-MCNC: 14.2 G/DL — SIGNIFICANT CHANGE UP (ref 12–16)
IMM GRANULOCYTES NFR BLD AUTO: 0.3 % — SIGNIFICANT CHANGE UP (ref 0.1–0.3)
INR BLD: 1.06 RATIO — SIGNIFICANT CHANGE UP (ref 0.65–1.3)
LYMPHOCYTES # BLD AUTO: 1.35 K/UL — SIGNIFICANT CHANGE UP (ref 1.2–3.4)
LYMPHOCYTES # BLD AUTO: 19.9 % — LOW (ref 20.5–51.1)
MCHC RBC-ENTMCNC: 30.5 PG — SIGNIFICANT CHANGE UP (ref 27–31)
MCHC RBC-ENTMCNC: 32.3 G/DL — SIGNIFICANT CHANGE UP (ref 32–37)
MCV RBC AUTO: 94.6 FL — SIGNIFICANT CHANGE UP (ref 81–99)
MONOCYTES # BLD AUTO: 0.76 K/UL — HIGH (ref 0.1–0.6)
MONOCYTES NFR BLD AUTO: 11.2 % — HIGH (ref 1.7–9.3)
NEUTROPHILS # BLD AUTO: 4.49 K/UL — SIGNIFICANT CHANGE UP (ref 1.4–6.5)
NEUTROPHILS NFR BLD AUTO: 66.3 % — SIGNIFICANT CHANGE UP (ref 42.2–75.2)
NRBC # BLD: 0 /100 WBCS — SIGNIFICANT CHANGE UP (ref 0–0)
PLATELET # BLD AUTO: 185 K/UL — SIGNIFICANT CHANGE UP (ref 130–400)
POTASSIUM SERPL-MCNC: 4.4 MMOL/L — SIGNIFICANT CHANGE UP (ref 3.5–5)
POTASSIUM SERPL-SCNC: 4.4 MMOL/L — SIGNIFICANT CHANGE UP (ref 3.5–5)
PROT SERPL-MCNC: 7.6 G/DL — SIGNIFICANT CHANGE UP (ref 6–8)
PROTHROM AB SERPL-ACNC: 12.2 SEC — SIGNIFICANT CHANGE UP (ref 9.95–12.87)
RBC # BLD: 4.65 M/UL — SIGNIFICANT CHANGE UP (ref 4.2–5.4)
RBC # FLD: 13.1 % — SIGNIFICANT CHANGE UP (ref 11.5–14.5)
SODIUM SERPL-SCNC: 142 MMOL/L — SIGNIFICANT CHANGE UP (ref 135–146)
WBC # BLD: 6.78 K/UL — SIGNIFICANT CHANGE UP (ref 4.8–10.8)
WBC # FLD AUTO: 6.78 K/UL — SIGNIFICANT CHANGE UP (ref 4.8–10.8)

## 2019-12-04 PROCEDURE — 93010 ELECTROCARDIOGRAM REPORT: CPT

## 2019-12-04 RX ORDER — FLUOXETINE HCL 10 MG
0 CAPSULE ORAL
Qty: 0 | Refills: 0 | DISCHARGE

## 2019-12-04 RX ORDER — OMEPRAZOLE 10 MG/1
0 CAPSULE, DELAYED RELEASE ORAL
Qty: 0 | Refills: 0 | DISCHARGE

## 2019-12-04 RX ORDER — ALPRAZOLAM 0.25 MG
1 TABLET ORAL
Qty: 0 | Refills: 0 | DISCHARGE

## 2019-12-04 NOTE — H&P PST ADULT - NSICDXPASTSURGICALHX_GEN_ALL_CORE_FT
PAST SURGICAL HISTORY:  Abnormal magnetic resonance cholangiopancreatography (MRCP) STENT PLACED IN BILE DUCT    Breast calcifications     H/O sebaceous cyst     S/P laparoscopic cholecystectomy 5/18/19 WITH REMOVAL OF CBD STENT    S/P lumpectomy, left breast     S/P tonsillectomy

## 2019-12-04 NOTE — H&P PST ADULT - REASON FOR ADMISSION
68 Y/O FEMALE HERE FOR PRE-ADMISSION SURGICAL TESTING. PATIENT REPORTS H/O LEFT BREAST CANCER. S/P CHEMO & RAD. GOING FOR [facility] Q3 MON. SAW CALCIFICATIONS IN BOTH BREASTS. RIGHT BREAST BIOPSY REVEALED + RADICAL SCAR. ALSO STATES SHES HAVING A SEBACEOUS CYST REMOVED FROM RIGHT BREAST AND HAVING HER PORT REMOVED.  NOW FOR SCHEDULED PROCEDURE.

## 2019-12-12 ENCOUNTER — FORM ENCOUNTER (OUTPATIENT)
Age: 69
End: 2019-12-12

## 2019-12-12 NOTE — ASU PATIENT PROFILE, ADULT - PSH
Abnormal magnetic resonance cholangiopancreatography (MRCP)  STENT PLACED IN BILE DUCT  Breast calcifications    H/O sebaceous cyst    S/P laparoscopic cholecystectomy  5/18/19 WITH REMOVAL OF CBD STENT  S/P lumpectomy, left breast    S/P tonsillectomy

## 2019-12-12 NOTE — ASU PATIENT PROFILE, ADULT - PMH
Anxiety    Barretts esophagus    Breast cancer  left arm prec  GERD (gastroesophageal reflux disease)    Hiatal hernia    HTN (hypertension)    Hypothyroid  med dose stable ~ 1 yr  MVP (mitral valve prolapse)    Thyroid nodule

## 2019-12-13 ENCOUNTER — RESULT REVIEW (OUTPATIENT)
Age: 69
End: 2019-12-13

## 2019-12-13 ENCOUNTER — APPOINTMENT (OUTPATIENT)
Dept: BREAST CENTER | Facility: AMBULATORY SURGERY CENTER | Age: 69
End: 2019-12-13
Payer: MEDICARE

## 2019-12-13 ENCOUNTER — OUTPATIENT (OUTPATIENT)
Dept: OUTPATIENT SERVICES | Facility: HOSPITAL | Age: 69
LOS: 1 days | Discharge: HOME | End: 2019-12-13
Payer: MEDICARE

## 2019-12-13 VITALS
SYSTOLIC BLOOD PRESSURE: 148 MMHG | RESPIRATION RATE: 18 BRPM | TEMPERATURE: 98 F | HEART RATE: 70 BPM | HEIGHT: 67 IN | WEIGHT: 181.66 LBS | DIASTOLIC BLOOD PRESSURE: 77 MMHG | OXYGEN SATURATION: 96 %

## 2019-12-13 VITALS
OXYGEN SATURATION: 95 % | SYSTOLIC BLOOD PRESSURE: 136 MMHG | HEART RATE: 69 BPM | DIASTOLIC BLOOD PRESSURE: 62 MMHG | RESPIRATION RATE: 16 BRPM

## 2019-12-13 DIAGNOSIS — Z90.49 ACQUIRED ABSENCE OF OTHER SPECIFIED PARTS OF DIGESTIVE TRACT: Chronic | ICD-10-CM

## 2019-12-13 DIAGNOSIS — R92.1 MAMMOGRAPHIC CALCIFICATION FOUND ON DIAGNOSTIC IMAGING OF BREAST: Chronic | ICD-10-CM

## 2019-12-13 DIAGNOSIS — Z98.890 OTHER SPECIFIED POSTPROCEDURAL STATES: Chronic | ICD-10-CM

## 2019-12-13 DIAGNOSIS — Z87.2 PERSONAL HISTORY OF DISEASES OF THE SKIN AND SUBCUTANEOUS TISSUE: Chronic | ICD-10-CM

## 2019-12-13 DIAGNOSIS — Z90.89 ACQUIRED ABSENCE OF OTHER ORGANS: Chronic | ICD-10-CM

## 2019-12-13 DIAGNOSIS — R93.3 ABNORMAL FINDINGS ON DIAGNOSTIC IMAGING OF OTHER PARTS OF DIGESTIVE TRACT: Chronic | ICD-10-CM

## 2019-12-13 PROCEDURE — 19281 PERQ DEVICE BREAST 1ST IMAG: CPT | Mod: RT

## 2019-12-13 PROCEDURE — 88304 TISSUE EXAM BY PATHOLOGIST: CPT | Mod: 26

## 2019-12-13 PROCEDURE — 88305 TISSUE EXAM BY PATHOLOGIST: CPT | Mod: 26

## 2019-12-13 PROCEDURE — 88300 SURGICAL PATH GROSS: CPT | Mod: 26,59

## 2019-12-13 PROCEDURE — 88342 IMHCHEM/IMCYTCHM 1ST ANTB: CPT | Mod: 26

## 2019-12-13 PROCEDURE — 36590 REMOVAL TUNNELED CV CATH: CPT

## 2019-12-13 PROCEDURE — 88341 IMHCHEM/IMCYTCHM EA ADD ANTB: CPT | Mod: 26

## 2019-12-13 PROCEDURE — 19301 PARTIAL MASTECTOMY: CPT | Mod: RT

## 2019-12-13 PROCEDURE — 19120 REMOVAL OF BREAST LESION: CPT | Mod: 59,RT

## 2019-12-13 RX ORDER — SODIUM CHLORIDE 9 MG/ML
1000 INJECTION, SOLUTION INTRAVENOUS
Refills: 0 | Status: DISCONTINUED | OUTPATIENT
Start: 2019-12-13 | End: 2019-12-30

## 2019-12-13 RX ORDER — HYDROMORPHONE HYDROCHLORIDE 2 MG/ML
0.5 INJECTION INTRAMUSCULAR; INTRAVENOUS; SUBCUTANEOUS
Refills: 0 | Status: DISCONTINUED | OUTPATIENT
Start: 2019-12-13 | End: 2019-12-13

## 2019-12-13 RX ORDER — ONDANSETRON 8 MG/1
4 TABLET, FILM COATED ORAL ONCE
Refills: 0 | Status: DISCONTINUED | OUTPATIENT
Start: 2019-12-13 | End: 2019-12-30

## 2019-12-13 RX ORDER — OXYCODONE AND ACETAMINOPHEN 5; 325 MG/1; MG/1
1 TABLET ORAL ONCE
Refills: 0 | Status: DISCONTINUED | OUTPATIENT
Start: 2019-12-13 | End: 2019-12-13

## 2019-12-13 RX ADMIN — SODIUM CHLORIDE 100 MILLILITER(S): 9 INJECTION, SOLUTION INTRAVENOUS at 13:31

## 2019-12-13 NOTE — BRIEF OPERATIVE NOTE - NSICDXBRIEFPREOP_GEN_ALL_CORE_FT
PRE-OP DIAGNOSIS:  Primary breast cancer 13-Dec-2019 13:12:45  Fan Hampton  Sebaceous cyst 13-Dec-2019 13:12:33  Fan Hampton  Breast mass, right 13-Dec-2019 13:12:13  Fan Hampton information could not be obtained

## 2019-12-13 NOTE — BRIEF OPERATIVE NOTE - NSICDXBRIEFPOSTOP_GEN_ALL_CORE_FT
POST-OP DIAGNOSIS:  Breast mass, right 13-Dec-2019 13:13:22  Fan Hampton  Sebaceous cyst 13-Dec-2019 13:13:13  Fan Hampton  Breast CA 13-Dec-2019 13:13:05  Fan Hampton

## 2019-12-18 LAB — SURGICAL PATHOLOGY STUDY: SIGNIFICANT CHANGE UP

## 2019-12-19 ENCOUNTER — APPOINTMENT (OUTPATIENT)
Dept: BREAST CENTER | Facility: CLINIC | Age: 69
End: 2019-12-19
Payer: MEDICARE

## 2019-12-19 VITALS
SYSTOLIC BLOOD PRESSURE: 132 MMHG | WEIGHT: 178 LBS | BODY MASS INDEX: 28.61 KG/M2 | DIASTOLIC BLOOD PRESSURE: 84 MMHG | TEMPERATURE: 98.5 F | HEIGHT: 66 IN

## 2019-12-19 PROCEDURE — 99024 POSTOP FOLLOW-UP VISIT: CPT

## 2019-12-20 DIAGNOSIS — L72.0 EPIDERMAL CYST: ICD-10-CM

## 2019-12-20 DIAGNOSIS — F41.9 ANXIETY DISORDER, UNSPECIFIED: ICD-10-CM

## 2019-12-20 DIAGNOSIS — R92.0 MAMMOGRAPHIC MICROCALCIFICATION FOUND ON DIAGNOSTIC IMAGING OF BREAST: ICD-10-CM

## 2019-12-20 DIAGNOSIS — I34.1 NONRHEUMATIC MITRAL (VALVE) PROLAPSE: ICD-10-CM

## 2019-12-20 DIAGNOSIS — N63.14 UNSPECIFIED LUMP IN THE RIGHT BREAST, LOWER INNER QUADRANT: ICD-10-CM

## 2019-12-20 DIAGNOSIS — I10 ESSENTIAL (PRIMARY) HYPERTENSION: ICD-10-CM

## 2019-12-20 DIAGNOSIS — Z45.2 ENCOUNTER FOR ADJUSTMENT AND MANAGEMENT OF VASCULAR ACCESS DEVICE: ICD-10-CM

## 2019-12-20 DIAGNOSIS — E03.9 HYPOTHYROIDISM, UNSPECIFIED: ICD-10-CM

## 2019-12-20 DIAGNOSIS — D24.1 BENIGN NEOPLASM OF RIGHT BREAST: ICD-10-CM

## 2019-12-20 DIAGNOSIS — N60.21 FIBROADENOSIS OF RIGHT BREAST: ICD-10-CM

## 2019-12-27 DIAGNOSIS — C50.912 MALIGNANT NEOPLASM OF UNSPECIFIED SITE OF LEFT FEMALE BREAST: ICD-10-CM

## 2019-12-27 DIAGNOSIS — C50.919 MALIGNANT NEOPLASM OF UNSPECIFIED SITE OF UNSPECIFIED FEMALE BREAST: ICD-10-CM

## 2020-01-09 ENCOUNTER — APPOINTMENT (OUTPATIENT)
Dept: RADIATION ONCOLOGY | Facility: CLINIC | Age: 70
End: 2020-01-09
Payer: MEDICARE

## 2020-01-09 VITALS
DIASTOLIC BLOOD PRESSURE: 67 MMHG | SYSTOLIC BLOOD PRESSURE: 113 MMHG | RESPIRATION RATE: 16 BRPM | TEMPERATURE: 98.2 F | BODY MASS INDEX: 29.46 KG/M2 | WEIGHT: 182.5 LBS | HEART RATE: 90 BPM

## 2020-01-09 PROCEDURE — 99212 OFFICE O/P EST SF 10 MIN: CPT

## 2020-01-09 RX ORDER — VALSARTAN 80 MG/1
80 TABLET ORAL
Refills: 0 | Status: DISCONTINUED | COMMUNITY
End: 2020-01-09

## 2020-01-09 RX ORDER — ONDANSETRON 8 MG/1
8 TABLET ORAL EVERY 8 HOURS
Qty: 30 | Refills: 3 | Status: DISCONTINUED | COMMUNITY
Start: 2018-02-28 | End: 2020-01-09

## 2020-01-09 RX ORDER — OXYCODONE AND ACETAMINOPHEN 5; 325 MG/1; MG/1
5-325 TABLET ORAL
Qty: 15 | Refills: 0 | Status: DISCONTINUED | COMMUNITY
Start: 2019-12-13 | End: 2020-01-09

## 2020-01-09 RX ORDER — PROCHLORPERAZINE MALEATE 10 MG/1
10 TABLET ORAL EVERY 6 HOURS
Qty: 30 | Refills: 3 | Status: DISCONTINUED | COMMUNITY
Start: 2018-02-28 | End: 2020-01-09

## 2020-01-09 RX ORDER — VENLAFAXINE HYDROCHLORIDE 150 MG/1
150 CAPSULE, EXTENDED RELEASE ORAL
Refills: 0 | Status: DISCONTINUED | COMMUNITY
End: 2020-01-09

## 2020-01-09 RX ORDER — OXYCODONE AND ACETAMINOPHEN 5; 325 MG/1; MG/1
5-325 TABLET ORAL
Qty: 15 | Refills: 0 | Status: DISCONTINUED | COMMUNITY
Start: 2018-08-24 | End: 2020-01-09

## 2020-01-09 NOTE — HISTORY OF PRESENT ILLNESS
[FreeTextEntry1] : Patient here for 1 year follow up after treatment to the left breast. Patient had cholycystectomy 5/2019. Last mammo/US 10/2019. Right breast biopsy done 12/2019 and right breast lumpectomy done 12/13/19 for benign disease.  . Followed up with Dr. Hampton 12/19/19 and had port removed. Seen by Dr. Lynne 11/11/19. Next mammo/US b/l 4/2020. No complaints.

## 2020-01-09 NOTE — PHYSICAL EXAM
[Normal] : normal heart rate and rhythm, normal S1 and S2, and no murmurs present [de-identified] : Breast contours are symmetric in the sitting position.  Supine the right breast is soft with healing area at 9 o'clock from recent excision.  The left treated breast has a modest increase in fibrosis.  No suspicious findings.

## 2020-01-09 NOTE — DISEASE MANAGEMENT
[Pathological] : TNM Stage: p [IIA] : IIA [FreeTextEntry4] : HER2+ Breast cancer [NTNM] : 0 [TTNM] : 2 [de-identified] : Left breast [MTNM] : 0

## 2020-01-23 NOTE — CONSULT LETTER
[Please see my note below.] : Please see my note below. [Dear  ___] : Dear  [unfilled], [Sincerely,] : Sincerely, [FreeTextEntry3] : Fan Hampton M.D., F.A.C.S.  [FreeTextEntry1] : This letter is in regard to our mutual patient who underwent breast lumpectomy with needle localization.  Enclosed is a copy of her pathology results.\par \par We will continue to take excellent care of your patient.\par \par Please feel free to contact our office with any questions or concerns.  [FreeTextEntry2] : Dex Bush M.D.\[address]\[place] [id number]

## 2020-01-23 NOTE — ASSESSMENT
[FreeTextEntry1] : 69 year old female with recent history of right breast radial sclerosing lesion on core biopsy 10/28/19, status post right breast NLOC 12/13/19 demonstrating large duct sclerosing papilloma.  She is also status post excision of right breast sebaceous cyst and removal of her mediport at that time.  She has a history of left breast invasive, poorly differentiated ductal carcinoma, left intraductal papilloma, left complex sclerosing lesion, and left proliferative type fibrocystic chances all on core biopsies performed 1/25/18.  She received neoadjuvant chemotherapy.  She is status post lumpectomy and SLNB 8/24/18.  She has a remote history of benign left breast NLOC in the 1990's for calcifications.  Her family history is significant for her paternal aunt with breast cancer at 73 and her maternal aunt with breast cancer at 67.  Her mother had pancreatic cancer. \par \par On physical exam, all of her incisions are healing well and the patient is without complaints.  Her pathology results were reviewed.  For now, she will need a bilateral diagnostic mammogram and  ultrasound for April 2020, with subsequent follow up clinical breast examination.  Of note, she will also be following up for her left breast benign stereotactic core biopsy demonstrating benign coarse stromal calcifications that was performed 10/28/19.  All of her questions were appropriately answered.

## 2020-01-23 NOTE — REVIEW OF SYSTEMS
[Fever] : no fever [Chills] : no chills [Breast Pain] : no breast pain [de-identified] : 0/10 pain scale

## 2020-01-23 NOTE — DATA REVIEWED
[FreeTextEntry1] : Surgical Final Report\par Final Diagnosis\par 1. Breast, right skin lesion, excision:\par - Epidermal inclusion cyst associated with a dermal scar,\par chronic lymphohistiocytic cell inflammation including a focal\par multinucleated foreign body type giant cell reaction, and remote\par hemorrhage indicated by the presence of hemosiderin laden\par macrophages; consistent with remote rupture with resolution.\par 2. Breast, right lower inner quadrant mass, needle localized\par lumpectomy:\par - Large duct sclerosing papilloma containing focal florid duct\par hyperplasia located adjacent to a healing prior biopsy site.\par - Focal atypical lobular hyperplasia (ALH, see comment).\par - Proliferative type fibrocystic changes associated with\par microcalcifications.\par - Microscopic benign capillary hemangioma.\par Comment: The diagnosis is supported by immunohistochemical stain\par negative and/or weakened/diminished for E-cadherin in the foci of\par in-situ atypical lobular neoplasia (ALH).\par 3. Chest wall, Mediport, removal:\par - "BARD" mediport/implantable venous access system (macroscopic\par diagnosis).\par Verified by: Norris Cloud M.D.\par (Electronic Signature)\par Reported on: 12/18/19 15:02 EST, [address] [place] [address],\[facility] [id number]\par Phone: [phone]   Fax: [phone]\par

## 2020-01-23 NOTE — HISTORY OF PRESENT ILLNESS
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  Mother had pancreatic cancer.  Brother has prostate cancer.  No BRCA testing in family.  \par \par Status post insertion of a mediport on 3/5/18.   Status post re-placement of mediport 3/29/18 for neoadjuvant chemotherapy.  Completed Herceptin and Perjeta.  \par \par Status post Left NLOC of three areas and SLNB 8/24/18 demonstrating 0/2 (-); 3. Breast, left 12 o'clock mass, needle localized lumpectomy: Sclerosing large duct papilloma associated with florid duct hyperplasia and coarse stromal calcifications, radial sclerosing lesion (radial scar).  Diffuse histologic changes consistent with prior systemic therapy effect.  Focal healing prior biopsy site change with no residual carcinoma identified.  AJCC 8th Edition Pathologic Stage: ypT0, p(sn)N0, pMx, pCR (pathologic complete response to neoadjuvant therapy).\par Breast, left 11 o'clock mass, needle localized lumpectomy: Radial sclerosing lesion (radial scar) located adjacent to healing prior biopsy site changes.\par Breast, left 9 o'clock mass, needle localized lumpectomy: Sclerosing small duct papilloma containing florid duct hyperplasia with adjacent healing prior biopsy site changes.\par \par \par Left breast fragments of hyalinized fibroadenoma containing coarse stromal calcifications with adjacent benign atrophic fatty breast tissue on stereotactic core biopsy 10/28/19; Posterior (tophat).  Findings are benign concordant and 6-month followup left breast mammogram recommended.\par \par Right breast radial sclerosing lesion on ultrasound guided core biopsy 10/28/19; 2:00 N2, 8 mm (top hat).  Surgical excision recommended.  \par \par Status post Right NLOC and removal of mediport 12/13/19 demonstrating large duct sclerosing papilloma containing focal florid duct hyperplasia and focal atypical lobular hyperplasia.

## 2020-02-02 LAB
ALBUMIN SERPL ELPH-MCNC: 4 G/DL
ALBUMIN SERPL ELPH-MCNC: 4.3 G/DL
ALP BLD-CCNC: 61 U/L
ALP BLD-CCNC: 73 U/L
ALT SERPL-CCNC: 27 U/L
ALT SERPL-CCNC: 38 U/L
ANION GAP SERPL CALC-SCNC: 13 MMOL/L
ANION GAP SERPL CALC-SCNC: 15 MMOL/L
AST SERPL-CCNC: 20 U/L
AST SERPL-CCNC: 29 U/L
BILIRUB DIRECT SERPL-MCNC: <0.2 MG/DL
BILIRUB INDIRECT SERPL-MCNC: >0.1 MG/DL
BILIRUB SERPL-MCNC: 0.2 MG/DL
BILIRUB SERPL-MCNC: 0.3 MG/DL
BUN SERPL-MCNC: 21 MG/DL
BUN SERPL-MCNC: 28 MG/DL
CALCIUM SERPL-MCNC: 9.7 MG/DL
CALCIUM SERPL-MCNC: 9.7 MG/DL
CANCER AG15-3 SERPL-ACNC: 23.4 U/ML
CANCER AG15-3 SERPL-ACNC: 23.8 U/ML
CEA SERPL-MCNC: 1.2 NG/ML
CEA SERPL-MCNC: 2 NG/ML
CHLORIDE SERPL-SCNC: 103 MMOL/L
CHLORIDE SERPL-SCNC: 105 MMOL/L
CO2 SERPL-SCNC: 24 MMOL/L
CO2 SERPL-SCNC: 24 MMOL/L
CREAT SERPL-MCNC: 0.7 MG/DL
CREAT SERPL-MCNC: 0.8 MG/DL
GLUCOSE SERPL-MCNC: 84 MG/DL
GLUCOSE SERPL-MCNC: 91 MG/DL
HCT VFR BLD CALC: 38.3 %
HCT VFR BLD CALC: 39.1 %
HGB BLD-MCNC: 12.6 G/DL
HGB BLD-MCNC: 13.3 G/DL
MCHC RBC-ENTMCNC: 31 PG
MCHC RBC-ENTMCNC: 31 PG
MCHC RBC-ENTMCNC: 32.9 G/DL
MCHC RBC-ENTMCNC: 34 G/DL
MCV RBC AUTO: 91.1 FL
MCV RBC AUTO: 94.3 FL
PLATELET # BLD AUTO: 169 K/UL
PLATELET # BLD AUTO: 188 K/UL
PMV BLD: 10.1 FL
PMV BLD: 11.2 FL
POTASSIUM SERPL-SCNC: 4.3 MMOL/L
POTASSIUM SERPL-SCNC: 4.4 MMOL/L
PROT SERPL-MCNC: 7.1 G/DL
PROT SERPL-MCNC: 7.1 G/DL
RBC # BLD: 4.06 M/UL
RBC # BLD: 4.29 M/UL
RBC # FLD: 13.1 %
RBC # FLD: 13.8 %
SODIUM SERPL-SCNC: 142 MMOL/L
SODIUM SERPL-SCNC: 142 MMOL/L
WBC # FLD AUTO: 5.31 K/UL
WBC # FLD AUTO: 8.46 K/UL

## 2020-04-20 ENCOUNTER — RESULT REVIEW (OUTPATIENT)
Age: 70
End: 2020-04-20

## 2020-04-20 ENCOUNTER — OUTPATIENT (OUTPATIENT)
Dept: OUTPATIENT SERVICES | Facility: HOSPITAL | Age: 70
LOS: 1 days | Discharge: HOME | End: 2020-04-20
Payer: MEDICARE

## 2020-04-20 DIAGNOSIS — R92.8 OTHER ABNORMAL AND INCONCLUSIVE FINDINGS ON DIAGNOSTIC IMAGING OF BREAST: ICD-10-CM

## 2020-04-20 DIAGNOSIS — Z90.89 ACQUIRED ABSENCE OF OTHER ORGANS: Chronic | ICD-10-CM

## 2020-04-20 DIAGNOSIS — Z98.890 OTHER SPECIFIED POSTPROCEDURAL STATES: Chronic | ICD-10-CM

## 2020-04-20 DIAGNOSIS — Z87.2 PERSONAL HISTORY OF DISEASES OF THE SKIN AND SUBCUTANEOUS TISSUE: Chronic | ICD-10-CM

## 2020-04-20 DIAGNOSIS — R93.3 ABNORMAL FINDINGS ON DIAGNOSTIC IMAGING OF OTHER PARTS OF DIGESTIVE TRACT: Chronic | ICD-10-CM

## 2020-04-20 DIAGNOSIS — R92.1 MAMMOGRAPHIC CALCIFICATION FOUND ON DIAGNOSTIC IMAGING OF BREAST: Chronic | ICD-10-CM

## 2020-04-20 DIAGNOSIS — Z90.49 ACQUIRED ABSENCE OF OTHER SPECIFIED PARTS OF DIGESTIVE TRACT: Chronic | ICD-10-CM

## 2020-04-20 PROCEDURE — 77066 DX MAMMO INCL CAD BI: CPT | Mod: 26

## 2020-04-20 PROCEDURE — G0279: CPT | Mod: 26

## 2020-04-20 PROCEDURE — 76641 ULTRASOUND BREAST COMPLETE: CPT | Mod: 26,50

## 2020-05-04 ENCOUNTER — APPOINTMENT (OUTPATIENT)
Dept: HEMATOLOGY ONCOLOGY | Facility: CLINIC | Age: 70
End: 2020-05-04
Payer: MEDICARE

## 2020-05-04 PROCEDURE — 99443: CPT

## 2020-05-04 NOTE — PHYSICAL EXAM
[Restricted in physically strenuous activity but ambulatory and able to carry out work of a light or sedentary nature] : Status 1- Restricted in physically strenuous activity but ambulatory and able to carry out work of a light or sedentary nature, e.g., light house work, office work [de-identified] : physical exm deferred.

## 2020-05-04 NOTE — HISTORY OF PRESENT ILLNESS
[Home] : at home, [unfilled] , at the time of the visit. [Medical Office: ([facility])___] : at the medical office located in  [Patient] : the patient [de-identified] : Monica has completed neoadjuvant chemotherapy with TCHP x 6 times.  She responded well and her left breast mass has reduced significantly. She complains fatigue, \par She experienced side effects from the chemotherapy including diarrhea, poor appetite and insomnia. These symptoms have got better since chemo completed.\par \par On 7/27/18, she had PET/CT which showed No pathologic FDG uptake to suggest definite biologic tumor activity. All previously seen pathologic FDG uptake in the left breast has resolved. Previously seen mild FDG uptake in left axillary nodes has resolved. Findings are consistent with good treatment response. Mildly FDG avid portacaval lymph node, SUV max 5.7, is nonspecific but less likely to represent biologic tumor activity. This can be reassessed on follow-up. \par \par On 7/23/18, she had b/l breast MRI which showed Interval decrease in size of left breast 12:00 axis biopsy-proven invasive ductal carcinoma; currently it measures up to 1.0 cm, previously measuring up to 3.2 cm. \par \par 9/7/18:\par The patient is here today for followup and treatment. She underwent NLOC left lumpectomy and SLNB on 8/24/18. The pathology showed no evidence of residual disease and 2 SLN negative. She recovered well from surgery.\par She still has loose bowel movement sometimes. her appetite is improving.\par \par 10/19/2018\par Patient is here for a follow up prior to getting treatment with Herceptin and Perjeta. Her lumpectomy scars have healed up well and she has some mild tenderness. It assured that it will get better. She was concerned about irregularity at incision site and again we discussed the mammography findings which showed post lumpectomy changes ( 10/2018) and that it is scar tissue. \par She had mapping done for radiation, yet to start though. \par Her loose bowel movements have improved as well. \par She offers no other complaints.\par \par 12/28/18:\par The patient is here for followup visit. She completed radiation in 11/2018. She feels well and does not have new complains. In 10/2018, she had b/l dx mammo. There was no suspicious finding. In 11/2018, she had 2D echo. Her LVEF was 50-55%.\par \par 2/8/19: Pt is here for a follow-up visit. She is doing well on herceptin and perjeta. Last echo from Nov 2018 showed EF of 55-65%. No fresh complaints. Last US from Jan 2019 showed:\par Oval, hypoechoic, circumscribed, parallel mass in the right breast, 4:00  axis, 2 to 3 cm from the nipple measures 0.9 x 0.4 x 1.1 cm. This is not  significantly changed in size from 2/26/2018 where it measured 1.0 x 0.4  x 1.1 cm, and was previously biopsy proven to be benign.\par   Additional oval, circumscribed, hypoechoic mass in the right breast, 2:00  axis, 7 cm from the nipple measures 1.7 x 1.2 x 0.5 cm. This was likely  present on the prior MRI of 2/15/2018 (series 8, image 49) and is also  likely benign. Continued sonographic follow-up is recommended.\par   Benign post lumpectomy changes are seen in the left breast, 12:00 axis.  No suspicious solid or cystic masses are seen in the left breast.\par \par 4/12/19:\par The patient is here for f/u visit. She complains RUQ pain for a few weeks. She saw Dr. Manzo and had abdominal sonogram which showed Cholelithiasis without sonographic evidence of acute cholecystitis. CBD dilatation (11 mm), new since March 2016. Recommend correlation with laboratory tests. If indicated, MRCP can be obtained to exclude CBD \par obstruction or stone. She has been taking Cipro and Flagyl. She did not have any fever. She is given a referral for MRCP but she has not made an appointment yet.\par She has b/l dx mammo on 4/219. There was no suspicious finding.\par \par 7/26/2019: \par The patient is here for follow up. Since last visit , she had a cholecystectomy which showed chronic cholecystitis and cholelithiasis. Her bilirubin and liver enzymes normalized after surgery. She is also switched to venlafaxine for her anxiety. She is feeling much better. She is trying to exercise and regain energy. She complains left axillary pain at the site of SNLBx . She is also concerned about multiple lesions of her breasts that were proven to be benign in the past. She is due for a mammogram in October 2019.\par The patient also reported palpitations when she gets up quickly from a seating or a sleeping position , and bilateral ankle swelling by the end of the day . She will see her PMD to adjust her medications. \par \par 11/11/19\par Patient is here today for follow up visit.  She had a mammogram 10/2019 which showed calcification in left breast and a radial sclerosing lesion on biopsy of right breast.  She will have a lumpectomy on right breast for this high risk benign lesion and port removal on same day-12/11/19 by Dr. Hampton.  She offers no further complaints.\par \par 5/4/2020:\par Patient is evaluated via telehealth. She has IDC of the left breast, ER/[place] negative and Her-2 positive, with large tumor mass and FDG avid left axillary adenopathy, clinical stage T3N1, s/p neoadjuvant chemotherapy followed by left lumpectomy and SLNBin 8/2018, achieved pCR - followed by  maintenance herceptin and Perjeta. She received adjuvant breast radiotherapy. She had a mammogram 10/2019 which showed calcification in left breast and a radial sclerosing lesion on biopsy of right breast. 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 She recovered well from surgery. She does not have any new complains. [de-identified] : 66 yo female is here for f/u visit and chemotherapy. She was recently diagnosed left breast cancer. She had screening mammogram in 06/2015 and it was normal. In Jan 2018 she noticed a lump in left breast. A diagnostic mammo on 1/18/2018 showed a  2.1  cm  mass In  the  region  of  patient's  palpable  abnormality.  No  other  suspicious  masses,  areas of  architectural  distortion  or  cluster  of  microcalcifications  in  either  breast.  Evaluation  of  the  left  axilla  demonstrates  normal-appearing  lymph  nodes.\par   \par Targeted  Left  Breast  Sonogram showed  \par In  the  region  of  the  patient's  palpable  abnormality  at  the  12:00  location  3  cm  from  the  nipple,  there  is  an  irregular  hypoechoic  mass  measuring  5.2  cm  x  3.2  cm  x  1.2  cm  which  corresponds  to  mammographic  findings. \par Other  masses  also  identified  within  the  left  breast  and  are  as  follows: 6:00  location  2  cm  from  the  nipple  measuring  4.1  cm  x  2.4  cm  x  1.4  cm,  9-10  o'clock  location  4  cm  from  the  nipple  measuring  2.1  cm  1.4  cm  x  0.4  cm  and  11:00  location  5  to  6  cm  from  the  nipple  measuring  1.3  cm  x  2.1  cm  x  0.4  cm.   An  ultrasound-guided  core  biopsy  of  the  each  mass  is  recommended\par \par She then had USG guided biopsies of those lesions. Pathology showed \par Site  A:  Left  breast,  11:00  N5-6. \par Intraductal  papilloma  containing  florid  duct  hyperplasia. \par   \par Site  B:  Left  breast  9-10  o'clock  N4. \par -Complex  sclerosing  lesion  (radial  scar)  containing  focal  florid  duct  hyperplasia.  \par -Atrophic  breast  tissue  associated  with  coarse  stromal  calcifications. \par  Site  C:  Left  Breast  6:00  N2 \par -Benign  breast  tissue  with  proliferative  type  fibrocystic  changes  including  dominant  dense  stromal  fibrosis,  focal  florid  duct  hyperplasia,  sclerosing  adenosis,  macrocyst  wall  fragments,  columnar  cell  change/hyperplasia  without  atypia,  and  rare  microcalcifications  present  in  benign  ducts. \par   \par Site  D:  Left  breast  12:00  N3 \par -Invasive  poorly  differentiated  duct  carcinoma  with  focal  necrosis, ER negative, IA <1%, Her-2 positive 3+ by IHC and Ki 67 50%.\par   \par Recommendation:  Surgical  excision.  Surgical  and  oncologic  follow-up  for  the  biopsy-proven  12:00  carcinoma  is  recommended.    Surgical  planning  for  excision  of  the  11:00  and  9-10  o'clock  high  risk  lesions  also  recommended\par \par On 2/13/2018, PET/CT  showed abnormal increased uptake is seen in multiple areas in the left breast. Max SUV 13.92 in the superior breast mass near surgical clip findings are \par consistent with biopsy proven malignancy in the superior mass. Remaining of the abnormal areas of increased uptake could be malignancy and/or postinflammatory nature. Correlate the findings with the biopsy reports.  There are PET positive left axillary lymph nodes with a max SUV 2.56. Findings could be postinflammatory and/or malignancy. \par No other areas of abnormal increased uptake is seen to suggest biologic tumor activity. \par \par On 2/15/18,  MRI breast was done. the report is still pending.\par \par The patient saw Dr. Hampton for surgical consultation. In light of large Her-2 positive tumor, she was given an option to have neoadjuvant chemotherapy followed by surgery. Monica is here with her sister for consultation of neoadjuvant systemic therapy.

## 2020-05-04 NOTE — CONSULT LETTER
[Dear  ___] : Dear  [unfilled], [Courtesy Letter:] : I had the pleasure of seeing your patient, [unfilled], in my office today. [Sincerely,] : Sincerely, [Please see my note below.] : Please see my note below. [DrRupesh ___] : Dr. RIDER [FreeTextEntry3] : Ernesto Lynne MD, PhD. [DrRupesh  ___] : Dr. RIDER

## 2020-05-04 NOTE — ASSESSMENT
[FreeTextEntry1] : 1. Poorly differentiated IDC of the left breast, ER/IN negative and Her-2 positive, with large tumor mass and FDG avid left axillary adenopathy, clinical stage T3N1, s/p neoadjuvant chemotherapy followed by left lumpectomy and SLNB, achieved pCR - followed by  adjuvant Anti- her 2 treatments\par 1. Right breast ALH, sclerosing papilloma and proliferative type fibrocystic changes, s/p lumpectomy.\par  \par Recommendation:\par -- Reviewed the pathology report on the right breast lumpectomy. It showed ALH which is benign finding but is associated with increased risk of developing breast cancer. She may have an option to take risk reduction agent for primary prevention. She wants to wait till her next visit to discuss about it. \par -- We also reviewed her family history again. her mother had pancreatic cancer at 60's and her maternal aunt had breast cancer at 60-70's. She is eligible for genetic testing. \par -- She had b/l dx mammo and US in 4/2020. There was no suspicious finding. She will have repeat left brest dx mammo in 6 months. \par -- RTO for followup visit in 3 months.\par \par

## 2020-07-31 ENCOUNTER — APPOINTMENT (OUTPATIENT)
Dept: BREAST CENTER | Facility: CLINIC | Age: 70
End: 2020-07-31
Payer: MEDICARE

## 2020-07-31 VITALS
BODY MASS INDEX: 29.25 KG/M2 | SYSTOLIC BLOOD PRESSURE: 126 MMHG | TEMPERATURE: 97.7 F | DIASTOLIC BLOOD PRESSURE: 84 MMHG | WEIGHT: 182 LBS | HEIGHT: 66 IN

## 2020-07-31 PROCEDURE — 99213 OFFICE O/P EST LOW 20 MIN: CPT

## 2020-07-31 NOTE — HISTORY OF PRESENT ILLNESS
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  Mother had pancreatic cancer.  Brother has prostate cancer.  No BRCA testing in family.  \par \par Status post insertion of a mediport on 3/5/18.   Status post re-placement of mediport 3/29/18 for neoadjuvant chemotherapy.  Completed Herceptin and Perjeta.  \par \par Status post Left NLOC of three areas and SLNB 8/24/18 demonstrating 0/2 (-); 3. Breast, left 12 o'clock mass, needle localized lumpectomy: Sclerosing large duct papilloma associated with florid duct hyperplasia and coarse stromal calcifications, radial sclerosing lesion (radial scar).  Diffuse histologic changes consistent with prior systemic therapy effect.  Focal healing prior biopsy site change with no residual carcinoma identified.  AJCC 8th Edition Pathologic Stage: ypT0, p(sn)N0, pMx, pCR (pathologic complete response to neoadjuvant therapy).\par Breast, left 11 o'clock mass, needle localized lumpectomy: Radial sclerosing lesion (radial scar) located adjacent to healing prior biopsy site changes.\par Breast, left 9 o'clock mass, needle localized lumpectomy: Sclerosing small duct papilloma containing florid duct hyperplasia with adjacent healing prior biopsy site changes.\par \par \par Left breast fragments of hyalinized fibroadenoma containing coarse stromal calcifications with adjacent benign atrophic fatty breast tissue on stereotactic core biopsy 10/28/19; Posterior (tophat).  Findings are benign concordant and 6-month followup left breast mammogram recommended.\par \par Right breast radial sclerosing lesion on ultrasound guided core biopsy 10/28/19; 2:00 N2, 8 mm (top hat).  Surgical excision recommended.  \par \par Status post Right NLOC and removal of mediport 12/13/19 demonstrating large duct sclerosing papilloma containing focal florid duct hyperplasia and focal atypical lobular hyperplasia.  \par \par ALEK ALMONTE is a 69 year old female patient who presents today in follow up for left breast cancer.\par Since her last visit, she has no new breast related complaints. \par Imaging was done on 04/20/20, which revealed no mammographic or sonographic evidence of malignancy. Post lumpectomy changes of the left breast. Postsurgical distortion of the right breast..\par \par She presents today for evaluation and imaging review.

## 2020-07-31 NOTE — PHYSICAL EXAM
[Normocephalic] : normocephalic [No dominant masses] : no dominant masses left breast [Atraumatic] : atraumatic [No dominant masses] : no dominant masses in right breast  [No Nipple Discharge] : no left nipple discharge [No Rashes] : no rashes [No Ulceration] : no ulceration [Breast Nipple Inversion] : nipples not inverted [de-identified] : well healed surgical scars.\par Palpable scar tissue.  [Breast Nipple Retraction] : nipples not retracted [de-identified] : No axillary lymphadenopathy appreciated. [de-identified] : No axillary lymphadenopathy appreciated.

## 2020-07-31 NOTE — ASSESSMENT
[FreeTextEntry1] : ALEK is a akbar 69 year old patient who presented today in follow up for a history of left breast cancer.  \par She has been doing well with no new breast related complaints. \par Imaging was done recently which revealed no mammographic or sonographic evidence of malignancy. Post lumpectomy changes of the left breast. Postsurgical distortion of the right breast, as detailed above. \par Physical exam was unrevealing today.\par \par Imaging with a left unilateral diagnostic mammogram and sonogram will be due in October 2020, and that will be scheduled today. \par She will return for follow-up and clinical breast exam in six months with Dr. Hampton.\par \par I spent a total of 15 minutes of face to face time with this patient, greater than 50% of which was spent in counseling and/or coordination of care.\par All of her questions were appropriately answered.\par She knows to call with any concerns.

## 2020-07-31 NOTE — DATA REVIEWED
[FreeTextEntry1] : B/L Dx Mammo - 04/20/2020:\par Breast composition:There are scattered areas of fibroglandular density.\par \par Findings:\par \par Mammogram:\par \par Linear markers denote the site of cutaneous scarring in bilateral breasts. Stable postlumpectomy changes are noted in the superior left breast. Postsurgical distortion is noted in the right breast periareolar region. No suspicious mass, microcalcifications or areas of architectural distortion is seen either breast. When compared to the previous examinations there is no significant interval change and nothing to suggest malignancy.\par \par Ultrasound:\par \par Bilateral whole breast ultrasound was performed.\par \par Right breast:\par \par At the 2:00 position 7 cm from the nipple, there is a stable hypoechoic mass measuring 1.6 x 1.2 x 0.5 cm.\par \par At the 4:00 position 2 to 3 cm from the nipple, there is a stable biopsy-proven benign mass measuring 1.1 x 0.9 x 0.3 cm.\par \par No additional suspicious solid or cystic masses identified. No axillary adenopathy.\par \par Left breast:\par At the 12:00 position, stable lumpectomy changes are again identified.\par \par No additional suspicious solid or cystic masses identified. No axillary adenopathy.\par \par Impression: No mammographic or sonographic evidence of malignancy. Post lumpectomy changes of the left breast. Postsurgical distortion of the right breast.\par \par Recommendation: As per post lumpectomy routine, a follow-up unilateral left mammogram is recommended.\par \par BI-RADS Category 2: Benign

## 2020-08-03 ENCOUNTER — LABORATORY RESULT (OUTPATIENT)
Age: 70
End: 2020-08-03

## 2020-08-03 ENCOUNTER — OUTPATIENT (OUTPATIENT)
Dept: OUTPATIENT SERVICES | Facility: HOSPITAL | Age: 70
LOS: 1 days | Discharge: HOME | End: 2020-08-03

## 2020-08-03 ENCOUNTER — APPOINTMENT (OUTPATIENT)
Dept: HEMATOLOGY ONCOLOGY | Facility: CLINIC | Age: 70
End: 2020-08-03
Payer: MEDICARE

## 2020-08-03 VITALS
WEIGHT: 180 LBS | BODY MASS INDEX: 28.93 KG/M2 | DIASTOLIC BLOOD PRESSURE: 74 MMHG | HEIGHT: 66 IN | HEART RATE: 101 BPM | TEMPERATURE: 97.7 F | SYSTOLIC BLOOD PRESSURE: 151 MMHG

## 2020-08-03 DIAGNOSIS — Z90.89 ACQUIRED ABSENCE OF OTHER ORGANS: Chronic | ICD-10-CM

## 2020-08-03 DIAGNOSIS — M25.512 PAIN IN LEFT SHOULDER: ICD-10-CM

## 2020-08-03 DIAGNOSIS — R92.1 MAMMOGRAPHIC CALCIFICATION FOUND ON DIAGNOSTIC IMAGING OF BREAST: Chronic | ICD-10-CM

## 2020-08-03 DIAGNOSIS — Z98.890 OTHER SPECIFIED POSTPROCEDURAL STATES: Chronic | ICD-10-CM

## 2020-08-03 DIAGNOSIS — Z13.79 ENCOUNTER FOR OTHER SCREENING FOR GENETIC AND CHROMOSOMAL ANOMALIES: ICD-10-CM

## 2020-08-03 DIAGNOSIS — Z87.2 PERSONAL HISTORY OF DISEASES OF THE SKIN AND SUBCUTANEOUS TISSUE: Chronic | ICD-10-CM

## 2020-08-03 DIAGNOSIS — R93.3 ABNORMAL FINDINGS ON DIAGNOSTIC IMAGING OF OTHER PARTS OF DIGESTIVE TRACT: Chronic | ICD-10-CM

## 2020-08-03 DIAGNOSIS — Z90.49 ACQUIRED ABSENCE OF OTHER SPECIFIED PARTS OF DIGESTIVE TRACT: Chronic | ICD-10-CM

## 2020-08-03 PROCEDURE — 99214 OFFICE O/P EST MOD 30 MIN: CPT

## 2020-08-03 RX ORDER — AMLODIPINE BESYLATE 5 MG/1
5 TABLET ORAL
Refills: 0 | Status: ACTIVE | COMMUNITY

## 2020-08-04 PROBLEM — M25.512 LEFT SHOULDER PAIN: Status: ACTIVE | Noted: 2020-08-04

## 2020-08-04 NOTE — ASSESSMENT
[FreeTextEntry1] : 1. Poorly differentiated IDC of the left breast, ER/[place] negative and Her-2 positive, with large tumor mass and FDG avid left axillary adenopathy, clinical stage T3N1, s/p neoadjuvant chemotherapy followed by left lumpectomy and SLNB, achieved pCR, s/p adjuvant WBI.\par 2. Right breast ALH, sclerosing papilloma and proliferative type fibrocystic changes, s/p lumpectomy.\par  \par Recommendation:\par -- Given finding of ALH in her right breast, we discussed breast cancer risk reduction measurements including close imaging surveillance and risk reduction agents for primary prevention. The patient does not want to take another medication. She opted to continue imaging surveillance. \par -- We also reviewed her family history again. Her mother had pancreatic cancer at 60's and her maternal aunt    had breast cancer at 60-70's. She is eligible for genetic testing. Integrated [employer] with [employer]      Hereditary Cancer Update Test ordered today.\par -- She had b/l dx mammo and US in 4/2020. There was no suspicious finding. As per post lumpectomy routine, a    follow-up unilateral left mammogram is recommended. Scheduled 10/2020.\par -- Left shoulder pain. Will order MRI of left shoulder without contrast.  Pt will call us the next day  to obtain results.\par -- RTO for followup visit in 6 months.\par \par Case was seen and discussed with Dr. Lynne  who agreed with the assessment and plan.\par \par

## 2020-08-04 NOTE — PHYSICAL EXAM
[Restricted in physically strenuous activity but ambulatory and able to carry out work of a light or sedentary nature] : Status 1- Restricted in physically strenuous activity but ambulatory and able to carry out work of a light or sedentary nature, e.g., light house work, office work [Normal] : affect appropriate [de-identified] : Left breast lumpectomy surgical incision healed up well. No tenderness. left SLNB scar healed well.

## 2020-08-04 NOTE — HISTORY OF PRESENT ILLNESS
[Medical Office: ([facility])___] : at the medical office located in  [Home] : at home, [unfilled] , at the time of the visit. [Patient] : the patient [de-identified] : Monica has completed neoadjuvant chemotherapy with TCHP x 6 times.  She responded well and her left breast mass has reduced significantly. She complains fatigue, \par She experienced side effects from the chemotherapy including diarrhea, poor appetite and insomnia. These symptoms have got better since chemo completed.\par \par On 7/27/18, she had PET/CT which showed No pathologic FDG uptake to suggest definite biologic tumor activity. All previously seen pathologic FDG uptake in the left breast has resolved. Previously seen mild FDG uptake in left axillary nodes has resolved. Findings are consistent with good treatment response. Mildly FDG avid portacaval lymph node, SUV max 5.7, is nonspecific but less likely to represent biologic tumor activity. This can be reassessed on follow-up. \par \par On 7/23/18, she had b/l breast MRI which showed Interval decrease in size of left breast 12:00 axis biopsy-proven invasive ductal carcinoma; currently it measures up to 1.0 cm, previously measuring up to 3.2 cm. \par \par 9/7/18:\par The patient is here today for followup and treatment. She underwent NLOC left lumpectomy and SLNB on 8/24/18. The pathology showed no evidence of residual disease and 2 SLN negative. She recovered well from surgery.\par She still has loose bowel movement sometimes. her appetite is improving.\par \par 10/19/2018\par Patient is here for a follow up prior to getting treatment with Herceptin and Perjeta. Her lumpectomy scars have healed up well and she has some mild tenderness. It assured that it will get better. She was concerned about irregularity at incision site and again we discussed the mammography findings which showed post lumpectomy changes ( 10/2018) and that it is scar tissue. \par She had mapping done for radiation, yet to start though. \par Her loose bowel movements have improved as well. \par She offers no other complaints.\par \par 12/28/18:\par The patient is here for followup visit. She completed radiation in 11/2018. She feels well and does not have new complains. In 10/2018, she had b/l dx mammo. There was no suspicious finding. In 11/2018, she had 2D echo. Her LVEF was 50-55%.\par \par 2/8/19: Pt is here for a follow-up visit. She is doing well on herceptin and perjeta. Last echo from Nov 2018 showed EF of 55-65%. No fresh complaints. Last US from Jan 2019 showed:\par Oval, hypoechoic, circumscribed, parallel mass in the right breast, 4:00  axis, 2 to 3 cm from the nipple measures 0.9 x 0.4 x 1.1 cm. This is not  significantly changed in size from 2/26/2018 where it measured 1.0 x 0.4  x 1.1 cm, and was previously biopsy proven to be benign.\par   Additional oval, circumscribed, hypoechoic mass in the right breast, 2:00  axis, 7 cm from the nipple measures 1.7 x 1.2 x 0.5 cm. This was likely  present on the prior MRI of 2/15/2018 (series 8, image 49) and is also  likely benign. Continued sonographic follow-up is recommended.\par   Benign post lumpectomy changes are seen in the left breast, 12:00 axis.  No suspicious solid or cystic masses are seen in the left breast.\par \par 4/12/19:\par The patient is here for f/u visit. She complains RUQ pain for a few weeks. She saw Dr. Manzo and had abdominal sonogram which showed Cholelithiasis without sonographic evidence of acute cholecystitis. CBD dilatation (11 mm), new since March 2016. Recommend correlation with laboratory tests. If indicated, MRCP can be obtained to exclude CBD \par obstruction or stone. She has been taking Cipro and Flagyl. She did not have any fever. She is given a referral for MRCP but she has not made an appointment yet.\par She has b/l dx mammo on 4/219. There was no suspicious finding.\par \par 7/26/2019: \par The patient is here for follow up. Since last visit , she had a cholecystectomy which showed chronic cholecystitis and cholelithiasis. Her bilirubin and liver enzymes normalized after surgery. She is also switched to venlafaxine for her anxiety. She is feeling much better. She is trying to exercise and regain energy. She complains left axillary pain at the site of SNLBx . She is also concerned about multiple lesions of her breasts that were proven to be benign in the past. She is due for a mammogram in October 2019.\par The patient also reported palpitations when she gets up quickly from a seating or a sleeping position , and bilateral ankle swelling by the end of the day . She will see her PMD to adjust her medications. \par \par 11/11/19\par Patient is here today for follow up visit.  She had a mammogram 10/2019 which showed calcification in left breast and a radial sclerosing lesion on biopsy of right breast.  She will have a lumpectomy on right breast for this high risk benign lesion and port removal on same day-12/11/19 by Dr. Hampton.  She offers no further complaints.\par \par 5/4/2020:\par Patient is evaluated via telehealth. 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 She received adjuvant breast radiotherapy. She had a mammogram 10/2019 which showed calcification in left breast and a radial sclerosing lesion on biopsy of right breast. 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 She recovered well from surgery. She does not have any new complains.\par \par 8/3/2020\par 70 yo female is here today for follow up visit. She was diagnosed with ER/MA neg and her-2 pos IDC of the left breast, s/p neoadjuvant chemotherapy with TCHP x 6 followed by by left lumpectomy and SLNB in 8/2018, achieved pCR, s/p adjuvant WBI and completed maintenance Herceptin and Perjeta, remains KASHMIR.  In 12/2019 she had right lumpectomy and was found to have focal atypical lobular hyperplasia (ALH), and proliferative type fibrocystic changes associated with calcifications from a previous biopsy site. Primary prevention with risk reduction agent was discussed with her previously. She was reluctant.  Today, she c/o worsening pain to her left shoulder area x few months.  No trauma to site reported.  \par Last mammogram / breast US were done 4/2020 which showed no mammographic or sonographic evidence of malignancy. Post lumpectomy changes of the left breast. Postsurgical distortion of the right breast. \par Pt requests for genetic testing to be done today.  Family history includes;  mother- pancreatic ca at 62 yo, maternal aunt- breast cancer at 70's yo, paternal aunt- breast cancer at 70's yo, brother- prostate cancer at 62 yo, cousin- bone cancer at 12 yo.\par \par  [de-identified] : 68 yo female is here for f/u visit and chemotherapy. She was recently diagnosed left breast cancer. She had screening mammogram in 06/2015 and it was normal. In Jan 2018 she noticed a lump in left breast. A diagnostic mammo on 1/18/2018 showed a  2.1  cm  mass In  the  region  of  patient's  palpable  abnormality.  No  other  suspicious  masses,  areas of  architectural  distortion  or  cluster  of  microcalcifications  in  either  breast.  Evaluation  of  the  left  axilla  demonstrates  normal-appearing  lymph  nodes.\par   \par Targeted  Left  Breast  Sonogram showed  \par In  the  region  of  the  patient's  palpable  abnormality  at  the  12:00  location  3  cm  from  the  nipple,  there  is  an  irregular  hypoechoic  mass  measuring  5.2  cm  x  3.2  cm  x  1.2  cm  which  corresponds  to  mammographic  findings. \par Other  masses  also  identified  within  the  left  breast  and  are  as  follows: 6:00  location  2  cm  from  the  nipple  measuring  4.1  cm  x  2.4  cm  x  1.4  cm,  9-10  o'clock  location  4  cm  from  the  nipple  measuring  2.1  cm  1.4  cm  x  0.4  cm  and  11:00  location  5  to  6  cm  from  the  nipple  measuring  1.3  cm  x  2.1  cm  x  0.4  cm.   An  ultrasound-guided  core  biopsy  of  the  each  mass  is  recommended\par \par She then had USG guided biopsies of those lesions. Pathology showed \par Site  A:  Left  breast,  11:00  N5-6. \par Intraductal  papilloma  containing  florid  duct  hyperplasia. \par   \par Site  B:  Left  breast  9-10  o'clock  N4. \par -Complex  sclerosing  lesion  (radial  scar)  containing  focal  florid  duct  hyperplasia.  \par -Atrophic  breast  tissue  associated  with  coarse  stromal  calcifications. \par  Site  C:  Left  Breast  6:00  N2 \par -Benign  breast  tissue  with  proliferative  type  fibrocystic  changes  including  dominant  dense  stromal  fibrosis,  focal  florid  duct  hyperplasia,  sclerosing  adenosis,  macrocyst  wall  fragments,  columnar  cell  change/hyperplasia  without  atypia,  and  rare  microcalcifications  present  in  benign  ducts. \par   \par Site  D:  Left  breast  12:00  N3 \par -Invasive  poorly  differentiated  duct  carcinoma  with  focal  necrosis, ER negative, TN <1%, Her-2 positive 3+ by IHC and Ki 67 50%.\par   \par Recommendation:  Surgical  excision.  Surgical  and  oncologic  follow-up  for  the  biopsy-proven  12:00  carcinoma  is  recommended.    Surgical  planning  for  excision  of  the  11:00  and  9-10  o'clock  high  risk  lesions  also  recommended\par \par On 2/13/2018, PET/CT  showed abnormal increased uptake is seen in multiple areas in the left breast. Max SUV 13.92 in the superior breast mass near surgical clip findings are \par consistent with biopsy proven malignancy in the superior mass. Remaining of the abnormal areas of increased uptake could be malignancy and/or postinflammatory nature. Correlate the findings with the biopsy reports.  There are PET positive left axillary lymph nodes with a max SUV 2.56. Findings could be postinflammatory and/or malignancy. \par No other areas of abnormal increased uptake is seen to suggest biologic tumor activity. \par \par On 2/15/18,  MRI breast was done. the report is still pending.\par \par The patient saw Dr. Hampton for surgical consultation. In light of large Her-2 positive tumor, she was given an option to have neoadjuvant chemotherapy followed by surgery. Monica is here with her sister for consultation of neoadjuvant systemic therapy.

## 2020-08-05 DIAGNOSIS — Z13.79 ENCOUNTER FOR OTHER SCREENING FOR GENETIC AND CHROMOSOMAL ANOMALIES: ICD-10-CM

## 2020-08-05 DIAGNOSIS — M25.512 PAIN IN LEFT SHOULDER: ICD-10-CM

## 2020-08-05 DIAGNOSIS — C50.912 MALIGNANT NEOPLASM OF UNSPECIFIED SITE OF LEFT FEMALE BREAST: ICD-10-CM

## 2020-08-05 DIAGNOSIS — N60.91 UNSPECIFIED BENIGN MAMMARY DYSPLASIA OF RIGHT BREAST: ICD-10-CM

## 2020-09-15 ENCOUNTER — OUTPATIENT (OUTPATIENT)
Dept: OUTPATIENT SERVICES | Facility: HOSPITAL | Age: 70
LOS: 1 days | Discharge: HOME | End: 2020-09-15
Payer: MEDICARE

## 2020-09-15 DIAGNOSIS — Z98.890 OTHER SPECIFIED POSTPROCEDURAL STATES: Chronic | ICD-10-CM

## 2020-09-15 DIAGNOSIS — C50.412 MALIGNANT NEOPLASM OF UPPER-OUTER QUADRANT OF LEFT FEMALE BREAST: ICD-10-CM

## 2020-09-15 DIAGNOSIS — Z87.2 PERSONAL HISTORY OF DISEASES OF THE SKIN AND SUBCUTANEOUS TISSUE: Chronic | ICD-10-CM

## 2020-09-15 DIAGNOSIS — R93.3 ABNORMAL FINDINGS ON DIAGNOSTIC IMAGING OF OTHER PARTS OF DIGESTIVE TRACT: Chronic | ICD-10-CM

## 2020-09-15 DIAGNOSIS — Z90.49 ACQUIRED ABSENCE OF OTHER SPECIFIED PARTS OF DIGESTIVE TRACT: Chronic | ICD-10-CM

## 2020-09-15 DIAGNOSIS — R92.1 MAMMOGRAPHIC CALCIFICATION FOUND ON DIAGNOSTIC IMAGING OF BREAST: Chronic | ICD-10-CM

## 2020-09-15 DIAGNOSIS — Z90.89 ACQUIRED ABSENCE OF OTHER ORGANS: Chronic | ICD-10-CM

## 2020-09-15 PROCEDURE — 73030 X-RAY EXAM OF SHOULDER: CPT | Mod: 26,LT

## 2020-10-15 ENCOUNTER — OUTPATIENT (OUTPATIENT)
Dept: OUTPATIENT SERVICES | Facility: HOSPITAL | Age: 70
LOS: 1 days | Discharge: HOME | End: 2020-10-15
Payer: MEDICARE

## 2020-10-15 ENCOUNTER — RESULT REVIEW (OUTPATIENT)
Age: 70
End: 2020-10-15

## 2020-10-15 DIAGNOSIS — Z90.49 ACQUIRED ABSENCE OF OTHER SPECIFIED PARTS OF DIGESTIVE TRACT: Chronic | ICD-10-CM

## 2020-10-15 DIAGNOSIS — Z87.2 PERSONAL HISTORY OF DISEASES OF THE SKIN AND SUBCUTANEOUS TISSUE: Chronic | ICD-10-CM

## 2020-10-15 DIAGNOSIS — Z90.89 ACQUIRED ABSENCE OF OTHER ORGANS: Chronic | ICD-10-CM

## 2020-10-15 DIAGNOSIS — R93.3 ABNORMAL FINDINGS ON DIAGNOSTIC IMAGING OF OTHER PARTS OF DIGESTIVE TRACT: Chronic | ICD-10-CM

## 2020-10-15 DIAGNOSIS — R92.1 MAMMOGRAPHIC CALCIFICATION FOUND ON DIAGNOSTIC IMAGING OF BREAST: Chronic | ICD-10-CM

## 2020-10-15 DIAGNOSIS — R92.8 OTHER ABNORMAL AND INCONCLUSIVE FINDINGS ON DIAGNOSTIC IMAGING OF BREAST: ICD-10-CM

## 2020-10-15 DIAGNOSIS — Z98.890 OTHER SPECIFIED POSTPROCEDURAL STATES: Chronic | ICD-10-CM

## 2020-10-15 PROCEDURE — 77065 DX MAMMO INCL CAD UNI: CPT | Mod: 26,LT

## 2020-10-15 PROCEDURE — G0279: CPT | Mod: 26

## 2020-10-15 PROCEDURE — 76642 ULTRASOUND BREAST LIMITED: CPT | Mod: 26,LT

## 2021-01-25 ENCOUNTER — APPOINTMENT (OUTPATIENT)
Dept: HEMATOLOGY ONCOLOGY | Facility: CLINIC | Age: 71
End: 2021-01-25
Payer: MEDICARE

## 2021-01-25 ENCOUNTER — OUTPATIENT (OUTPATIENT)
Dept: OUTPATIENT SERVICES | Facility: HOSPITAL | Age: 71
LOS: 1 days | Discharge: HOME | End: 2021-01-25

## 2021-01-25 VITALS
HEART RATE: 76 BPM | DIASTOLIC BLOOD PRESSURE: 84 MMHG | TEMPERATURE: 97.8 F | SYSTOLIC BLOOD PRESSURE: 147 MMHG | BODY MASS INDEX: 28.77 KG/M2 | HEIGHT: 66 IN | WEIGHT: 179 LBS

## 2021-01-25 DIAGNOSIS — Z98.890 OTHER SPECIFIED POSTPROCEDURAL STATES: Chronic | ICD-10-CM

## 2021-01-25 DIAGNOSIS — Z90.89 ACQUIRED ABSENCE OF OTHER ORGANS: Chronic | ICD-10-CM

## 2021-01-25 DIAGNOSIS — R92.1 MAMMOGRAPHIC CALCIFICATION FOUND ON DIAGNOSTIC IMAGING OF BREAST: Chronic | ICD-10-CM

## 2021-01-25 DIAGNOSIS — R93.3 ABNORMAL FINDINGS ON DIAGNOSTIC IMAGING OF OTHER PARTS OF DIGESTIVE TRACT: Chronic | ICD-10-CM

## 2021-01-25 DIAGNOSIS — Z87.2 PERSONAL HISTORY OF DISEASES OF THE SKIN AND SUBCUTANEOUS TISSUE: Chronic | ICD-10-CM

## 2021-01-25 DIAGNOSIS — Z90.49 ACQUIRED ABSENCE OF OTHER SPECIFIED PARTS OF DIGESTIVE TRACT: Chronic | ICD-10-CM

## 2021-01-25 PROCEDURE — 99214 OFFICE O/P EST MOD 30 MIN: CPT

## 2021-01-27 ENCOUNTER — APPOINTMENT (OUTPATIENT)
Dept: RADIATION ONCOLOGY | Facility: HOSPITAL | Age: 71
End: 2021-01-27
Payer: MEDICARE

## 2021-01-27 VITALS
BODY MASS INDEX: 28.73 KG/M2 | RESPIRATION RATE: 12 BRPM | WEIGHT: 178 LBS | SYSTOLIC BLOOD PRESSURE: 129 MMHG | TEMPERATURE: 97.5 F | HEART RATE: 91 BPM | DIASTOLIC BLOOD PRESSURE: 68 MMHG

## 2021-01-27 PROCEDURE — 99212 OFFICE O/P EST SF 10 MIN: CPT

## 2021-01-27 RX ORDER — KETOCONAZOLE 20 MG/G
2 CREAM TOPICAL TWICE DAILY
Qty: 60 | Refills: 0 | Status: DISCONTINUED | COMMUNITY
Start: 2018-06-06 | End: 2021-01-27

## 2021-01-27 NOTE — PHYSICAL EXAM
[Normal] : normal heart rate and rhythm, normal S1 and S2, and no murmurs present [de-identified] : Breast contours are symmetric in the sitting position.  The right breast is soft with no masses or tenderness.  The left treated breast has a modest increase in fibrosis.  No suspicious findings.

## 2021-01-27 NOTE — HISTORY OF PRESENT ILLNESS
[FreeTextEntry1] : Patient here for 2 year follow up after treatment to the left breast. Patient had cholycystectomy 5/2019. Last mammo/US 10/2019. Right breast biopsy done 12/2019 and right breast lumpectomy done 12/13/19 for benign disease.  . Followed up with Dr. Hampton 12/19/19 and had port removed. Seen by Dr. Lynne 11/11/19. Next mammo/US b/l 4/2020. No complaints.

## 2021-01-27 NOTE — DISEASE MANAGEMENT
[Pathological] : TNM Stage: p [IIA] : IIA [FreeTextEntry4] : HER2+ Breast cancer [TTNM] : 2 [NTNM] : 0 [de-identified] : Left breast [MTNM] : 0

## 2021-01-27 NOTE — VITALS
[Maximal Pain Intensity: 4/10] : 4/10 [Least Pain Intensity: 0/10] : 0/10 [Pain Description/Quality: ___] : Pain description/quality: [unfilled] [Pain Duration: ___] : Pain duration: [unfilled] [Pain Location: ___] : Pain Location: [unfilled] [OTC] : OTC [Pain Interferes with ADLs] : Pain does not interfere with activities of daily living [90: Able to carry normal activity; minor signs or symptoms of disease.] : 90: Able to carry normal activity; minor signs or symptoms of disease.

## 2021-01-28 ENCOUNTER — APPOINTMENT (OUTPATIENT)
Dept: BREAST CENTER | Facility: CLINIC | Age: 71
End: 2021-01-28
Payer: MEDICARE

## 2021-01-28 VITALS
HEIGHT: 66 IN | SYSTOLIC BLOOD PRESSURE: 126 MMHG | BODY MASS INDEX: 28.61 KG/M2 | TEMPERATURE: 97 F | WEIGHT: 178 LBS | DIASTOLIC BLOOD PRESSURE: 80 MMHG

## 2021-01-28 PROCEDURE — 99213 OFFICE O/P EST LOW 20 MIN: CPT

## 2021-01-28 PROCEDURE — 99072 ADDL SUPL MATRL&STAF TM PHE: CPT

## 2021-01-28 RX ORDER — METOPROLOL SUCCINATE 25 MG/1
25 TABLET, EXTENDED RELEASE ORAL
Refills: 0 | Status: ACTIVE | COMMUNITY

## 2021-01-28 RX ORDER — METOPROLOL TARTRATE 50 MG/1
50 TABLET, FILM COATED ORAL
Refills: 0 | Status: COMPLETED | COMMUNITY
End: 2021-01-28

## 2021-01-28 NOTE — ASSESSMENT
[FreeTextEntry1] : ALEK is a akbar 70 year old patient who presented today in follow up for a history of left breast cancer.  \par She has been doing well with no new breast related complaints. \par Imaging of the left breast was done recently which revealed stable benign post lumpectomy and postradiation changes in the left breast. No significant change to suggest malignancy., as detailed above. \par Physical exam was unrevealing today.\par \par Imaging with a bilateral screening mammogram will be due in April 2021, and that will be scheduled today. \par She will return for follow-up and clinical breast exam in six months.\par \par I spent a total of 20 minutes of face to face time with this patient, greater than 50% of which was spent in counseling and/or coordination of care.\par All of her questions were appropriately answered.\par She knows to call with any concerns.

## 2021-01-28 NOTE — DATA REVIEWED
[FreeTextEntry1] : Left Uni Dx Mammo & Sono - 10/15/2020:\par BREAST COMPOSITION: There are scattered areas of fibroglandular density.\par \par FINDINGS:\par \par Architectural distortion with fat necrosis in the superior central breast is consistent with post-lumpectomy change, stable. Metallic clips are seen in the region. A couple of biopsy markers are also identified. There are occasional scattered stable benign calcifications. Skin thickening and trabecular coarsening are consistent with prior radiation.\par \par No suspicious masses, areas of architectural distortion, or calcifications are seen. There has been no significant interval change from the prior studies.\par \par LEFT BREAST ULTRASOUND:\par \par Targeted ultrasound of the left lumpectomy site was performed.\par \par Comparison: 4/20/2020\par \par Hypoechoic to heterogeneous area is seen underlying the skin scar at 12:00 axis corresponding to the lumpectomy site, stable compared to prior study. There is no significant associated vascularity.\par \par IMPRESSION:\par \par 1.  Stable benign post lumpectomy and postradiation changes in the left breast.\par \par 2. No significant change to suggest malignancy.\par \par Recommendation: Unless otherwise indicated by clinical findings, the patient should resume annual screening in 6 months.\par \par BI-RADS Category 2: Benign\par

## 2021-01-28 NOTE — HISTORY OF PRESENT ILLNESS
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  Mother had pancreatic cancer.  Brother has prostate cancer.  No BRCA testing in family.  \par \par Status post insertion of a mediport on 3/5/18.   Status post re-placement of mediport 3/29/18 for neoadjuvant chemotherapy.  Completed Herceptin and Perjeta.  \par \par Status post Left NLOC of three areas and SLNB 8/24/18 demonstrating 0/2 (-); 3. Breast, left 12 o'clock mass, needle localized lumpectomy: Sclerosing large duct papilloma associated with florid duct hyperplasia and coarse stromal calcifications, radial sclerosing lesion (radial scar).  Diffuse histologic changes consistent with prior systemic therapy effect.  Focal healing prior biopsy site change with no residual carcinoma identified.  AJCC 8th Edition Pathologic Stage: ypT0, p(sn)N0, pMx, pCR (pathologic complete response to neoadjuvant therapy).\par Breast, left 11 o'clock mass, needle localized lumpectomy: Radial sclerosing lesion (radial scar) located adjacent to healing prior biopsy site changes.\par Breast, left 9 o'clock mass, needle localized lumpectomy: Sclerosing small duct papilloma containing florid duct hyperplasia with adjacent healing prior biopsy site changes.\par \par \par Left breast fragments of hyalinized fibroadenoma containing coarse stromal calcifications with adjacent benign atrophic fatty breast tissue on stereotactic core biopsy 10/28/19; Posterior (tophat).  Findings are benign concordant and 6-month followup left breast mammogram recommended.\par \par Right breast radial sclerosing lesion on ultrasound guided core biopsy 10/28/19; 2:00 N2, 8 mm (top hat).  Surgical excision recommended.  \par \par Status post Right NLOC and removal of mediport 12/13/19 demonstrating large duct sclerosing papilloma containing focal florid duct hyperplasia and focal atypical lobular hyperplasia.  \par \par ALEK ALMONTE is a 70 year old female patient who presents today in follow up for left breast cancer.\par Since her last visit, she has no new breast related complaints. \par Imaging of the left breast was done on 10/15/2020, which revealed stable benign post lumpectomy and postradiation changes in the left breast. No significant change to suggest malignancy.\par \par She presents today for evaluation and imaging review.

## 2021-01-28 NOTE — PHYSICAL EXAM
[Normocephalic] : normocephalic [Atraumatic] : atraumatic [No dominant masses] : no dominant masses in right breast  [No dominant masses] : no dominant masses left breast [No Nipple Discharge] : no left nipple discharge [No Rashes] : no rashes [No Ulceration] : no ulceration [Breast Nipple Inversion] : nipples not inverted [Breast Nipple Retraction] : nipples not retracted [de-identified] : well healed surgical scars.\par Palpable scar tissue.  [de-identified] : No axillary lymphadenopathy appreciated. [de-identified] : No axillary lymphadenopathy appreciated.

## 2021-01-30 NOTE — PHYSICAL EXAM
[Restricted in physically strenuous activity but ambulatory and able to carry out work of a light or sedentary nature] : Status 1- Restricted in physically strenuous activity but ambulatory and able to carry out work of a light or sedentary nature, e.g., light house work, office work [Normal] : affect appropriate [de-identified] : Left breast lumpectomy surgical incision healed well. No tenderness. left SLNB scar healed well.

## 2021-01-30 NOTE — HISTORY OF PRESENT ILLNESS
[Home] : at home, [unfilled] , at the time of the visit. [Medical Office: ([facility])___] : at the medical office located in  [Patient] : the patient [de-identified] : 66 yo female is here for f/u visit and chemotherapy. She was recently diagnosed left breast cancer. She had screening mammogram in 06/2015 and it was normal. In Jan 2018 she noticed a lump in left breast. A diagnostic mammo on 1/18/2018 showed a  2.1  cm  mass In  the  region  of  patient's  palpable  abnormality.  No  other  suspicious  masses,  areas of  architectural  distortion  or  cluster  of  microcalcifications  in  either  breast.  Evaluation  of  the  left  axilla  demonstrates  normal-appearing  lymph  nodes.\par   \par Targeted  Left  Breast  Sonogram showed  \par In  the  region  of  the  patient's  palpable  abnormality  at  the  12:00  location  3  cm  from  the  nipple,  there  is  an  irregular  hypoechoic  mass  measuring  5.2  cm  x  3.2  cm  x  1.2  cm  which  corresponds  to  mammographic  findings. \par Other  masses  also  identified  within  the  left  breast  and  are  as  follows: 6:00  location  2  cm  from  the  nipple  measuring  4.1  cm  x  2.4  cm  x  1.4  cm,  9-10  o'clock  location  4  cm  from  the  nipple  measuring  2.1  cm  1.4  cm  x  0.4  cm  and  11:00  location  5  to  6  cm  from  the  nipple  measuring  1.3  cm  x  2.1  cm  x  0.4  cm.   An  ultrasound-guided  core  biopsy  of  the  each  mass  is  recommended\par \par She then had USG guided biopsies of those lesions. Pathology showed \par Site  A:  Left  breast,  11:00  N5-6. \par Intraductal  papilloma  containing  florid  duct  hyperplasia. \par   \par Site  B:  Left  breast  9-10  o'clock  N4. \par -Complex  sclerosing  lesion  (radial  scar)  containing  focal  florid  duct  hyperplasia.  \par -Atrophic  breast  tissue  associated  with  coarse  stromal  calcifications. \par  Site  C:  Left  Breast  6:00  N2 \par -Benign  breast  tissue  with  proliferative  type  fibrocystic  changes  including  dominant  dense  stromal  fibrosis,  focal  florid  duct  hyperplasia,  sclerosing  adenosis,  macrocyst  wall  fragments,  columnar  cell  change/hyperplasia  without  atypia,  and  rare  microcalcifications  present  in  benign  ducts. 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  Surgical  and  oncologic  follow-up  for  the  biopsy-proven  12:00  carcinoma  is  recommended.    Surgical  planning  for  excision  of  the  11:00  and  9-10  o'clock  high  risk  lesions  also  recommended\par \par On 2/13/2018, PET/CT  showed abnormal increased uptake is seen in multiple areas in the left breast. Max SUV 13.92 in the superior breast mass near surgical clip findings are \par consistent with biopsy proven malignancy in the superior mass. Remaining of the abnormal areas of increased uptake could be malignancy and/or postinflammatory nature. Correlate the findings with the biopsy reports.  There are PET positive left axillary lymph nodes with a max SUV 2.56. Findings could be postinflammatory and/or malignancy. \par No other areas of abnormal increased uptake is seen to suggest biologic tumor activity. \par \par On 2/15/18,  MRI breast was done. the report is still pending.\par \par The patient saw Dr. Hampton for surgical consultation. In light of large Her-2 positive tumor, she was given an option to have neoadjuvant chemotherapy followed by surgery. Monica is here with her sister for consultation of neoadjuvant systemic therapy. [de-identified] : Monica has completed neoadjuvant chemotherapy with TCHP x 6 times.  She responded well and her left breast mass has reduced significantly. She complains fatigue, \par She experienced side effects from the chemotherapy including diarrhea, poor appetite and insomnia. These symptoms have got better since chemo completed.\par \par On 7/27/18, she had PET/CT which showed No pathologic FDG uptake to suggest definite biologic tumor activity. All previously seen pathologic FDG uptake in the left breast has resolved. Previously seen mild FDG uptake in left axillary nodes has resolved. Findings are consistent with good treatment response. Mildly FDG avid portacaval lymph node, SUV max 5.7, is nonspecific but less likely to represent biologic tumor activity. This can be reassessed on follow-up. \par \par On 7/23/18, she had b/l breast MRI which showed Interval decrease in size of left breast 12:00 axis biopsy-proven invasive ductal carcinoma; currently it measures up to 1.0 cm, previously measuring up to 3.2 cm. \par \par 9/7/18:\par The patient is here today for followup and treatment. She underwent NLOC left lumpectomy and SLNB on 8/24/18. The pathology showed no evidence of residual disease and 2 SLN negative. She recovered well from surgery.\par She still has loose bowel movement sometimes. her appetite is improving.\par \par 10/19/2018\par Patient is here for a follow up prior to getting treatment with Herceptin and Perjeta. Her lumpectomy scars have healed up well and she has some mild tenderness. It assured that it will get better. She was concerned about irregularity at incision site and again we discussed the mammography findings which showed post lumpectomy changes ( 10/2018) and that it is scar tissue. \par She had mapping done for radiation, yet to start though. \par Her loose bowel movements have improved as well. \par She offers no other complaints.\par \par 12/28/18:\par The patient is here for followup visit. She completed radiation in 11/2018. She feels well and does not have new complains. In 10/2018, she had b/l dx mammo. There was no suspicious finding. In 11/2018, she had 2D echo. Her LVEF was 50-55%.\par \par 2/8/19: Pt is here for a follow-up visit. She is doing well on herceptin and perjeta. Last echo from Nov 2018 showed EF of 55-65%. No fresh complaints. Last US from Jan 2019 showed:\par Oval, hypoechoic, circumscribed, parallel mass in the right breast, 4:00  axis, 2 to 3 cm from the nipple measures 0.9 x 0.4 x 1.1 cm. This is not  significantly changed in size from 2/26/2018 where it measured 1.0 x 0.4  x 1.1 cm, and was previously biopsy proven to be benign.\par   Additional oval, circumscribed, hypoechoic mass in the right breast, 2:00  axis, 7 cm from the nipple measures 1.7 x 1.2 x 0.5 cm. This was likely  present on the prior MRI of 2/15/2018 (series 8, image 49) and is also  likely benign. Continued sonographic follow-up is recommended.\par   Benign post lumpectomy changes are seen in the left breast, 12:00 axis.  No suspicious solid or cystic masses are seen in the left breast.\par \par 4/12/19:\par The patient is here for f/u visit. She complains RUQ pain for a few weeks. She saw Dr. Manzo and had abdominal sonogram which showed Cholelithiasis without sonographic evidence of acute cholecystitis. CBD dilatation (11 mm), new since March 2016. Recommend correlation with laboratory tests. If indicated, MRCP can be obtained to exclude CBD \par obstruction or stone. She has been taking Cipro and Flagyl. She did not have any fever. She is given a referral for MRCP but she has not made an appointment yet.\par She has b/l dx mammo on 4/219. There was no suspicious finding.\par \par 7/26/2019: \par The patient is here for follow up. Since last visit , she had a cholecystectomy which showed chronic cholecystitis and cholelithiasis. Her bilirubin and liver enzymes normalized after surgery. She is also switched to venlafaxine for her anxiety. She is feeling much better. She is trying to exercise and regain energy. She complains left axillary pain at the site of SNLBx . She is also concerned about multiple lesions of her breasts that were proven to be benign in the past. She is due for a mammogram in October 2019.\par The patient also reported palpitations when she gets up quickly from a seating or a sleeping position , and bilateral ankle swelling by the end of the day . She will see her PMD to adjust her medications. \par \par 11/11/19\par Patient is here today for follow up visit.  She had a mammogram 10/2019 which showed calcification in left breast and a radial sclerosing lesion on biopsy of right breast.  She will have a lumpectomy on right breast for this high risk benign lesion and port removal on same day-12/11/19 by Dr. Hampton.  She offers no further complaints.\par \par 5/4/2020:\par Patient is evaluated via telehealth. She has IDC of the left breast, ER/OH negative and Her-2 positive, with large tumor mass and FDG avid left axillary adenopathy, clinical stage T3N1, s/p neoadjuvant chemotherapy followed by left lumpectomy and SLNBin 8/2018, achieved pCR - followed by  maintenance herceptin and Perjeta. She received adjuvant breast radiotherapy. She had a mammogram 10/2019 which showed calcification in left breast and a radial sclerosing lesion on biopsy of right breast. 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 She recovered well from surgery. She does not have any new complains.\par \par 8/3/2020\par 68 yo female is here today for follow up visit. She was diagnosed with ER/OH neg and her-2 pos IDC of the left breast, s/p neoadjuvant chemotherapy with TCHP x 6 followed by by left lumpectomy and SLNB in 8/2018, achieved pCR, s/p adjuvant WBI and completed maintenance Herceptin and Perjeta, remains KASHMIR.  In 12/2019 she had right lumpectomy and was found to have focal atypical lobular hyperplasia (ALH), and proliferative type fibrocystic changes associated with calcifications from a previous biopsy site. Primary prevention with risk reduction agent was discussed with her previously. She was reluctant.  Today, she c/o worsening pain to her left shoulder area x few months.  No trauma to site reported.  \par Last mammogram / breast US were done 4/2020 which showed no mammographic or sonographic evidence of malignancy. Post lumpectomy changes of the left breast. Postsurgical distortion of the right breast. \par Pt requests for genetic testing to be done today.  Family history includes;  mother- pancreatic ca at 60 yo, maternal aunt- breast cancer at 70's yo, paternal aunt- breast cancer at 70's yo, brother- prostate cancer at 62 yo, cousin- bone cancer at 10 yo.\par \par 01/25/2021: MONICA is here for follow up visit for ER/OH neg and her-2 pos IDC of the left breast, s/p neoadjuvant chemotherapy with TCHP x 6 followed by by left lumpectomy and SLNB in 8/2018, achieved pCR, s/p adjuvant WBI and completed maintenance Herceptin and Perjeta, remains KASHMIR.  In 12/2019 she had right lumpectomy and was found to have focal atypical lobular hyperplasia (ALH), and proliferative type fibrocystic changes associated with calcifications from a previous biopsy site. Primary prevention with risk reduction agent was discussed with her previously. She was reluctant and opted to continue on surveillance. Pain of the left shoulder resolved: xray showed No evidence of acute osseous abnormalities. No sclerotic or lytic bone lesion. Mild AC joint and mild glenohumeral joint degenerative changes. Last diagnostic mammogram and US of left breast completed in Oct 2020 showed stable benign post lumpectomy and postradiation changes in the left breast and no significant change to suggest malignancy. She denies any new breast symptoms at this time. Labs reviewed from [facility]. \par \par

## 2021-01-30 NOTE — ASSESSMENT
[FreeTextEntry1] : 1. Poorly differentiated IDC of the left breast, ER/AL negative and Her-2 positive, with large tumor mass and FDG avid left axillary adenopathy, clinical stage T3N1, s/p neoadjuvant chemotherapy followed by left lumpectomy and SLNB, achieved pCR, s/p adjuvant WBI.\par 2. Right breast ALH, sclerosing papilloma and proliferative type fibrocystic changes, s/p lumpectomy.\par  \par Recommendation:\par -- Given finding of ALH in her right breast, we discussed breast cancer risk reduction measurements including close imaging surveillance and risk reduction agents for primary prevention. The patient does not want to take another medication. She opted to continue imaging surveillance. \par -- We also reviewed her family history. Her mother had pancreatic cancer at 60's and her maternal aunt had breast cancer at 60-70's. She had Integrated BRAC analysis with [employer]. The result reviewed with her.  There in no clinically significant mutation identified. \par -- Last diagnostic mammogram and US of left breast completed in Oct 2020 reviewed with patient which showed stable benign post lumpectomy and postradiation changes in the left breast and no significant change to suggest malignancy. All questions answered. She is scheduled for bilateral dx mammogram and US in April 2021\par -- Left shoulder pain resoled. MRI reviewed with patient. No evidence of acute osseous abnormalities. No sclerotic or lytic bone lesion. Mild AC joint and mild glenohumeral joint degenerative changes.\par -- RTO for followup visit in 6 months.\par \par Case was seen and discussed with Dr. Lynne  who agreed with the assessment and plan.\par \par

## 2021-02-03 DIAGNOSIS — N60.91 UNSPECIFIED BENIGN MAMMARY DYSPLASIA OF RIGHT BREAST: ICD-10-CM

## 2021-02-03 DIAGNOSIS — C50.912 MALIGNANT NEOPLASM OF UNSPECIFIED SITE OF LEFT FEMALE BREAST: ICD-10-CM

## 2021-04-22 ENCOUNTER — RESULT REVIEW (OUTPATIENT)
Age: 71
End: 2021-04-22

## 2021-04-22 ENCOUNTER — OUTPATIENT (OUTPATIENT)
Dept: OUTPATIENT SERVICES | Facility: HOSPITAL | Age: 71
LOS: 1 days | Discharge: HOME | End: 2021-04-22
Payer: MEDICARE

## 2021-04-22 DIAGNOSIS — Z12.31 ENCOUNTER FOR SCREENING MAMMOGRAM FOR MALIGNANT NEOPLASM OF BREAST: ICD-10-CM

## 2021-04-22 DIAGNOSIS — Z98.890 OTHER SPECIFIED POSTPROCEDURAL STATES: Chronic | ICD-10-CM

## 2021-04-22 DIAGNOSIS — Z87.2 PERSONAL HISTORY OF DISEASES OF THE SKIN AND SUBCUTANEOUS TISSUE: Chronic | ICD-10-CM

## 2021-04-22 DIAGNOSIS — Z90.89 ACQUIRED ABSENCE OF OTHER ORGANS: Chronic | ICD-10-CM

## 2021-04-22 DIAGNOSIS — R92.8 OTHER ABNORMAL AND INCONCLUSIVE FINDINGS ON DIAGNOSTIC IMAGING OF BREAST: ICD-10-CM

## 2021-04-22 DIAGNOSIS — R93.3 ABNORMAL FINDINGS ON DIAGNOSTIC IMAGING OF OTHER PARTS OF DIGESTIVE TRACT: Chronic | ICD-10-CM

## 2021-04-22 DIAGNOSIS — R92.1 MAMMOGRAPHIC CALCIFICATION FOUND ON DIAGNOSTIC IMAGING OF BREAST: Chronic | ICD-10-CM

## 2021-04-22 DIAGNOSIS — Z90.49 ACQUIRED ABSENCE OF OTHER SPECIFIED PARTS OF DIGESTIVE TRACT: Chronic | ICD-10-CM

## 2021-04-22 PROCEDURE — 77067 SCR MAMMO BI INCL CAD: CPT | Mod: 26

## 2021-04-22 PROCEDURE — 77063 BREAST TOMOSYNTHESIS BI: CPT | Mod: 26

## 2021-07-29 ENCOUNTER — APPOINTMENT (OUTPATIENT)
Dept: HEMATOLOGY ONCOLOGY | Facility: CLINIC | Age: 71
End: 2021-07-29
Payer: MEDICARE

## 2021-07-29 ENCOUNTER — OUTPATIENT (OUTPATIENT)
Dept: OUTPATIENT SERVICES | Facility: HOSPITAL | Age: 71
LOS: 1 days | Discharge: HOME | End: 2021-07-29

## 2021-07-29 ENCOUNTER — LABORATORY RESULT (OUTPATIENT)
Age: 71
End: 2021-07-29

## 2021-07-29 VITALS
DIASTOLIC BLOOD PRESSURE: 85 MMHG | HEART RATE: 80 BPM | BODY MASS INDEX: 28.28 KG/M2 | WEIGHT: 176 LBS | HEIGHT: 66 IN | TEMPERATURE: 97.5 F | SYSTOLIC BLOOD PRESSURE: 129 MMHG

## 2021-07-29 DIAGNOSIS — Z90.89 ACQUIRED ABSENCE OF OTHER ORGANS: Chronic | ICD-10-CM

## 2021-07-29 DIAGNOSIS — Z90.49 ACQUIRED ABSENCE OF OTHER SPECIFIED PARTS OF DIGESTIVE TRACT: Chronic | ICD-10-CM

## 2021-07-29 DIAGNOSIS — Z87.2 PERSONAL HISTORY OF DISEASES OF THE SKIN AND SUBCUTANEOUS TISSUE: Chronic | ICD-10-CM

## 2021-07-29 DIAGNOSIS — Z98.890 OTHER SPECIFIED POSTPROCEDURAL STATES: Chronic | ICD-10-CM

## 2021-07-29 DIAGNOSIS — R93.3 ABNORMAL FINDINGS ON DIAGNOSTIC IMAGING OF OTHER PARTS OF DIGESTIVE TRACT: Chronic | ICD-10-CM

## 2021-07-29 DIAGNOSIS — R92.1 MAMMOGRAPHIC CALCIFICATION FOUND ON DIAGNOSTIC IMAGING OF BREAST: Chronic | ICD-10-CM

## 2021-07-29 LAB
ALBUMIN SERPL ELPH-MCNC: 4.6 G/DL
ALP BLD-CCNC: 64 U/L
ALT SERPL-CCNC: 48 U/L
ANION GAP SERPL CALC-SCNC: 17 MMOL/L
AST SERPL-CCNC: 36 U/L
BILIRUB DIRECT SERPL-MCNC: <0.2 MG/DL
BILIRUB INDIRECT SERPL-MCNC: >0.2 MG/DL
BILIRUB SERPL-MCNC: 0.4 MG/DL
BUN SERPL-MCNC: 20 MG/DL
CALCIUM SERPL-MCNC: 10.1 MG/DL
CANCER AG15-3 SERPL-ACNC: 23.6 U/ML
CEA SERPL-MCNC: 1.5 NG/ML
CHLORIDE SERPL-SCNC: 102 MMOL/L
CO2 SERPL-SCNC: 22 MMOL/L
CREAT SERPL-MCNC: 0.8 MG/DL
GLUCOSE SERPL-MCNC: 127 MG/DL
HCT VFR BLD CALC: 43.6 %
HGB BLD-MCNC: 14.3 G/DL
MCHC RBC-ENTMCNC: 30.2 PG
MCHC RBC-ENTMCNC: 32.8 G/DL
MCV RBC AUTO: 92.2 FL
PLATELET # BLD AUTO: 236 K/UL
PMV BLD: 9.9 FL
POTASSIUM SERPL-SCNC: 4.3 MMOL/L
PROT SERPL-MCNC: 7.3 G/DL
RBC # BLD: 4.73 M/UL
RBC # FLD: 12.8 %
SODIUM SERPL-SCNC: 141 MMOL/L
WBC # FLD AUTO: 8.15 K/UL

## 2021-07-29 PROCEDURE — 99213 OFFICE O/P EST LOW 20 MIN: CPT

## 2021-08-01 LAB
ALBUMIN SERPL ELPH-MCNC: 4.4 G/DL
ALP BLD-CCNC: 69 U/L
ALT SERPL-CCNC: 36 U/L
ANION GAP SERPL CALC-SCNC: 12 MMOL/L
AST SERPL-CCNC: 28 U/L
BILIRUB DIRECT SERPL-MCNC: <0.2 MG/DL
BILIRUB INDIRECT SERPL-MCNC: >0 MG/DL
BILIRUB SERPL-MCNC: 0.2 MG/DL
BUN SERPL-MCNC: 25 MG/DL
CALCIUM SERPL-MCNC: 10.2 MG/DL
CANCER AG15-3 SERPL-ACNC: 21.8 U/ML
CEA SERPL-MCNC: 1.8 NG/ML
CHLORIDE SERPL-SCNC: 103 MMOL/L
CO2 SERPL-SCNC: 23 MMOL/L
CREAT SERPL-MCNC: 0.9 MG/DL
GLUCOSE SERPL-MCNC: 98 MG/DL
HCT VFR BLD CALC: 45 %
HGB BLD-MCNC: 15 G/DL
MCHC RBC-ENTMCNC: 30.6 PG
MCHC RBC-ENTMCNC: 33.3 G/DL
MCV RBC AUTO: 91.8 FL
PLATELET # BLD AUTO: 188 K/UL
PMV BLD: 9.8 FL
POTASSIUM SERPL-SCNC: 4.8 MMOL/L
PROT SERPL-MCNC: 7.4 G/DL
RBC # BLD: 4.9 M/UL
RBC # FLD: 12.6 %
SODIUM SERPL-SCNC: 138 MMOL/L
WBC # FLD AUTO: 5.99 K/UL

## 2021-08-01 NOTE — HISTORY OF PRESENT ILLNESS
[Home] : at home, [unfilled] , at the time of the visit. [Medical Office: ([facility])___] : at the medical office located in  [Patient] : the patient [de-identified] : 68 yo female is here for f/u visit and chemotherapy. She was recently diagnosed left breast cancer. She had screening mammogram in 06/2015 and it was normal. In Jan 2018 she noticed a lump in left breast. A diagnostic mammo on 1/18/2018 showed a  2.1  cm  mass In  the  region  of  patient's  palpable  abnormality.  No  other  suspicious  masses,  areas of  architectural  distortion  or  cluster  of  microcalcifications  in  either  breast.  Evaluation  of  the  left  axilla  demonstrates  normal-appearing  lymph  nodes.\par   \par Targeted  Left  Breast  Sonogram showed  \par In  the  region  of  the  patient's  palpable  abnormality  at  the  12:00  location  3  cm  from  the  nipple,  there  is  an  irregular  hypoechoic  mass  measuring  5.2  cm  x  3.2  cm  x  1.2  cm  which  corresponds  to  mammographic  findings. \par Other  masses  also  identified  within  the  left  breast  and  are  as  follows: 6:00  location  2  cm  from  the  nipple  measuring  4.1  cm  x  2.4  cm  x  1.4  cm,  9-10  o'clock  location  4  cm  from  the  nipple  measuring  2.1  cm  1.4  cm  x  0.4  cm  and  11:00  location  5  to  6  cm  from  the  nipple  measuring  1.3  cm  x  2.1  cm  x  0.4  cm.   An  ultrasound-guided  core  biopsy  of  the  each  mass  is  recommended\par \par She then had USG guided biopsies of those lesions. Pathology showed \par Site  A:  Left  breast,  11:00  N5-6. \par Intraductal  papilloma  containing  florid  duct  hyperplasia. \par   \par Site  B:  Left  breast  9-10  o'clock  N4. \par -Complex  sclerosing  lesion  (radial  scar)  containing  focal  florid  duct  hyperplasia.  \par -Atrophic  breast  tissue  associated  with  coarse  stromal  calcifications. \par  Site  C:  Left  Breast  6:00  N2 \par -Benign  breast  tissue  with  proliferative  type  fibrocystic  changes  including  dominant  dense  stromal  fibrosis,  focal  florid  duct  hyperplasia,  sclerosing  adenosis,  macrocyst  wall  fragments,  columnar  cell  change/hyperplasia  without  atypia,  and  rare  microcalcifications  present  in  benign  ducts. 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  Surgical  and  oncologic  follow-up  for  the  biopsy-proven  12:00  carcinoma  is  recommended.    Surgical  planning  for  excision  of  the  11:00  and  9-10  o'clock  high  risk  lesions  also  recommended\par \par On 2/13/2018, PET/CT  showed abnormal increased uptake is seen in multiple areas in the left breast. Max SUV 13.92 in the superior breast mass near surgical clip findings are \par consistent with biopsy proven malignancy in the superior mass. Remaining of the abnormal areas of increased uptake could be malignancy and/or postinflammatory nature. Correlate the findings with the biopsy reports.  There are PET positive left axillary lymph nodes with a max SUV 2.56. Findings could be postinflammatory and/or malignancy. \par No other areas of abnormal increased uptake is seen to suggest biologic tumor activity. \par \par On 2/15/18,  MRI breast was done. the report is still pending.\par \par The patient saw Dr. Hampton for surgical consultation. In light of large Her-2 positive tumor, she was given an option to have neoadjuvant chemotherapy followed by surgery. Monica is here with her sister for consultation of neoadjuvant systemic therapy. [de-identified] : Monica has completed neoadjuvant chemotherapy with TCHP x 6 times.  She responded well and her left breast mass has reduced significantly. She complains fatigue, \par She experienced side effects from the chemotherapy including diarrhea, poor appetite and insomnia. These symptoms have got better since chemo completed.\par \par On 7/27/18, she had PET/CT which showed No pathologic FDG uptake to suggest definite biologic tumor activity. All previously seen pathologic FDG uptake in the left breast has resolved. Previously seen mild FDG uptake in left axillary nodes has resolved. Findings are consistent with good treatment response. Mildly FDG avid portacaval lymph node, SUV max 5.7, is nonspecific but less likely to represent biologic tumor activity. This can be reassessed on follow-up. \par \par On 7/23/18, she had b/l breast MRI which showed Interval decrease in size of left breast 12:00 axis biopsy-proven invasive ductal carcinoma; currently it measures up to 1.0 cm, previously measuring up to 3.2 cm. \par \par 9/7/18:\par The patient is here today for followup and treatment. She underwent NLOC left lumpectomy and SLNB on 8/24/18. The pathology showed no evidence of residual disease and 2 SLN negative. She recovered well from surgery.\par She still has loose bowel movement sometimes. her appetite is improving.\par \par 10/19/2018\par Patient is here for a follow up prior to getting treatment with Herceptin and Perjeta. Her lumpectomy scars have healed up well and she has some mild tenderness. It assured that it will get better. She was concerned about irregularity at incision site and again we discussed the mammography findings which showed post lumpectomy changes ( 10/2018) and that it is scar tissue. \par She had mapping done for radiation, yet to start though. \par Her loose bowel movements have improved as well. \par She offers no other complaints.\par \par 12/28/18:\par The patient is here for followup visit. She completed radiation in 11/2018. She feels well and does not have new complains. In 10/2018, she had b/l dx mammo. There was no suspicious finding. In 11/2018, she had 2D echo. Her LVEF was 50-55%.\par \par 2/8/19: Pt is here for a follow-up visit. She is doing well on herceptin and perjeta. Last echo from Nov 2018 showed EF of 55-65%. No fresh complaints. Last US from Jan 2019 showed:\par Oval, hypoechoic, circumscribed, parallel mass in the right breast, 4:00  axis, 2 to 3 cm from the nipple measures 0.9 x 0.4 x 1.1 cm. This is not  significantly changed in size from 2/26/2018 where it measured 1.0 x 0.4  x 1.1 cm, and was previously biopsy proven to be benign.\par   Additional oval, circumscribed, hypoechoic mass in the right breast, 2:00  axis, 7 cm from the nipple measures 1.7 x 1.2 x 0.5 cm. This was likely  present on the prior MRI of 2/15/2018 (series 8, image 49) and is also  likely benign. Continued sonographic follow-up is recommended.\par   Benign post lumpectomy changes are seen in the left breast, 12:00 axis.  No suspicious solid or cystic masses are seen in the left breast.\par \par 4/12/19:\par The patient is here for f/u visit. She complains RUQ pain for a few weeks. She saw Dr. Manzo and had abdominal sonogram which showed Cholelithiasis without sonographic evidence of acute cholecystitis. CBD dilatation (11 mm), new since March 2016. Recommend correlation with laboratory tests. If indicated, MRCP can be obtained to exclude CBD \par obstruction or stone. She has been taking Cipro and Flagyl. She did not have any fever. She is given a referral for MRCP but she has not made an appointment yet.\par She has b/l dx mammo on 4/219. There was no suspicious finding.\par \par 7/26/2019: \par The patient is here for follow up. Since last visit , she had a cholecystectomy which showed chronic cholecystitis and cholelithiasis. Her bilirubin and liver enzymes normalized after surgery. She is also switched to venlafaxine for her anxiety. She is feeling much better. She is trying to exercise and regain energy. She complains left axillary pain at the site of SNLBx . She is also concerned about multiple lesions of her breasts that were proven to be benign in the past. She is due for a mammogram in October 2019.\par The patient also reported palpitations when she gets up quickly from a seating or a sleeping position , and bilateral ankle swelling by the end of the day . She will see her PMD to adjust her medications. \par \par 11/11/19\par Patient is here today for follow up visit.  She had a mammogram 10/2019 which showed calcification in left breast and a radial sclerosing lesion on biopsy of right breast.  She will have a lumpectomy on right breast for this high risk benign lesion and port removal on same day-12/11/19 by Dr. Hampton.  She offers no further complaints.\par \par 5/4/2020:\par Patient is evaluated via telehealth. She has IDC of the left breast, ER/NY negative and Her-2 positive, with large tumor mass and FDG avid left axillary adenopathy, clinical stage T3N1, s/p neoadjuvant chemotherapy followed by left lumpectomy and SLNBin 8/2018, achieved pCR - followed by  maintenance herceptin and Perjeta. She received adjuvant breast radiotherapy. She had a mammogram 10/2019 which showed calcification in left breast and a radial sclerosing lesion on biopsy of right breast. 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 She recovered well from surgery. She does not have any new complains.\par \par 8/3/2020\par 70 yo female is here today for follow up visit. She was diagnosed with ER/NY neg and her-2 pos IDC of the left breast, s/p neoadjuvant chemotherapy with TCHP x 6 followed by by left lumpectomy and SLNB in 8/2018, achieved pCR, s/p adjuvant WBI and completed maintenance Herceptin and Perjeta, remains KASHMIR.  In 12/2019 she had right lumpectomy and was found to have focal atypical lobular hyperplasia (ALH), and proliferative type fibrocystic changes associated with calcifications from a previous biopsy site. Primary prevention with risk reduction agent was discussed with her previously. She was reluctant.  Today, she c/o worsening pain to her left shoulder area x few months.  No trauma to site reported.  \par Last mammogram / breast US were done 4/2020 which showed no mammographic or sonographic evidence of malignancy. Post lumpectomy changes of the left breast. Postsurgical distortion of the right breast. \par Pt requests for genetic testing to be done today.  Family history includes;  mother- pancreatic ca at 60 yo, maternal aunt- breast cancer at 70's yo, paternal aunt- breast cancer at 70's yo, brother- prostate cancer at 64 yo, cousin- bone cancer at 12 yo.\par \par 01/25/2021: MONICA is here for follow up visit for ER/NY neg and her-2 pos IDC of the left breast, s/p neoadjuvant chemotherapy with TCHP x 6 followed by by left lumpectomy and SLNB in 8/2018, achieved pCR, s/p adjuvant WBI and completed maintenance Herceptin and Perjeta, remains KASHMIR.  In 12/2019 she had right lumpectomy and was found to have focal atypical lobular hyperplasia (ALH), and proliferative type fibrocystic changes associated with calcifications from a previous biopsy site. Primary prevention with risk reduction agent was discussed with her previously. She was reluctant and opted to continue on surveillance. Pain of the left shoulder resolved: xray showed No evidence of acute osseous abnormalities. No sclerotic or lytic bone lesion. Mild AC joint and mild glenohumeral joint degenerative changes. Last diagnostic mammogram and US of left breast completed in Oct 2020 showed stable benign post lumpectomy and postradiation changes in the left breast and no significant change to suggest malignancy. She denies any new breast symptoms at this time. Labs reviewed from [facility].\par \par 7/29/21:\par MONICA is here for follow up visit for ER/NY neg and her-2 pos IDC of the left breast, s/p neoadjuvant chemotherapy with TCHP x 6 followed by by left lumpectomy and SLNB in 8/2018, achieved pCR, s/p adjuvant WBI and completed maintenance Herceptin and Perjeta, remains KASHMIR.  In 12/2019 she had right lumpectomy and was found to have focal atypical lobular hyperplasia (ALH), and proliferative type fibrocystic changes associated with calcifications from a previous biopsy site. She opted not to take risk reduction agent. B/L Breast Mammogram  from 4/22/21 showed no mammographic evidence of malignancy. Today patient reports feeling well. She complains pain in her left breast sometimes.  \par

## 2021-08-01 NOTE — ASSESSMENT
[FreeTextEntry1] : 1. Poorly differentiated IDC of the left breast, ER/CT negative and Her-2 positive, with large tumor mass and FDG avid left axillary adenopathy, clinical stage T3N1, s/p neoadjuvant chemotherapy followed by left lumpectomy and SLNB, achieved pCR, s/p adjuvant WBI.\par 2. Right breast ALH, sclerosing papilloma and proliferative type fibrocystic changes, s/p lumpectomy.\par  \par Recommendation:\par -- Breast exam today did not reveal palpable abnormality. She will continue annual screening mammo. \par -- Order blood work for CBC, CMP and tumor markers.\par -- followup with Dr. Hampton as scheduled. \par -- RTO for followup visit in 6 months.\par \par Case was seen and examined with Dr. Lynne  who agreed with the assessment and plan.\par \par

## 2021-08-01 NOTE — PHYSICAL EXAM
[Restricted in physically strenuous activity but ambulatory and able to carry out work of a light or sedentary nature] : Status 1- Restricted in physically strenuous activity but ambulatory and able to carry out work of a light or sedentary nature, e.g., light house work, office work [Normal] : affect appropriate [de-identified] : Left breast lumpectomy surgical incision healed well. No tenderness. left SLNB scar healed well.

## 2021-08-03 DIAGNOSIS — N60.91 UNSPECIFIED BENIGN MAMMARY DYSPLASIA OF RIGHT BREAST: ICD-10-CM

## 2021-08-03 DIAGNOSIS — C50.412 MALIGNANT NEOPLASM OF UPPER-OUTER QUADRANT OF LEFT FEMALE BREAST: ICD-10-CM

## 2021-08-18 ENCOUNTER — APPOINTMENT (OUTPATIENT)
Dept: BREAST CENTER | Facility: CLINIC | Age: 71
End: 2021-08-18
Payer: MEDICARE

## 2021-08-18 VITALS
HEIGHT: 66 IN | SYSTOLIC BLOOD PRESSURE: 120 MMHG | BODY MASS INDEX: 28.28 KG/M2 | TEMPERATURE: 98.7 F | DIASTOLIC BLOOD PRESSURE: 70 MMHG | WEIGHT: 176 LBS

## 2021-08-18 PROCEDURE — 99213 OFFICE O/P EST LOW 20 MIN: CPT

## 2021-08-18 NOTE — REASON FOR VISIT
[Follow-Up: _____] : a [unfilled] follow-up visit [FreeTextEntry1] : h/o left breast cancer; imaging review.

## 2021-08-18 NOTE — DATA REVIEWED
[FreeTextEntry1] : B/L Screening Mammo - 04/22/2021:\par MAMMOGRAM FINDINGS:\par Mammography was performed including the following views: bilateral craniocaudal with tomosynthesis and bilateral mediolateral oblique with tomosynthesis.  The examination includes digital synthetic 2D and digital tomosynthesis 3D images. Additional imaging analysis was performed using CAD (computer-aided detection) software.\par \par There are scattered areas of fibroglandular density.\par \par Finding 1:  There is a stable area of architectural distortion at the site of lumpectomy seen in the left breast.\par \par Finding 2:  There is an area of benign architectural distortion corresponding to the site of surgery seen in the right breast.\par \par No suspicious mass, grouping of calcifications, or other abnormality is identified.\par \par IMPRESSION:\par There is no mammographic evidence of malignancy.\par \par RECOMMENDATION:\par Unless otherwise indicated by clinical findings, annual screening mammography recommended.\par \par ASSESSMENT:\par BI-RADS Category 2:  Benign

## 2021-08-18 NOTE — PHYSICAL EXAM
[Normocephalic] : normocephalic [Atraumatic] : atraumatic [No Supraclavicular Adenopathy] : no supraclavicular adenopathy [No dominant masses] : no dominant masses in right breast  [No dominant masses] : no dominant masses left breast [No Nipple Discharge] : no left nipple discharge [No Rashes] : no rashes [No Ulceration] : no ulceration [Breast Nipple Inversion] : nipples not inverted [Breast Nipple Retraction] : nipples not retracted [de-identified] : well healed surgical scars.\par Palpable scar tissue.  [de-identified] : No axillary lymphadenopathy appreciated. [de-identified] : No axillary lymphadenopathy appreciated.

## 2021-08-18 NOTE — ASSESSMENT
[FreeTextEntry1] : ALEK is a akbar 70 year old patient who presented today in follow up for a history of left breast cancer.  \par She has been doing well with no new breast related complaints. \par Imaging was done on 04/22/2021, which revealed there is a stable area of architectural distortion at the site of lumpectomy seen in the left breast.  There is an area of benign architectural distortion corresponding to the site of surgery seen in the right breast.  There is no mammographic evidence of malignancy, as detailed above. \par Physical exam was unrevealing today.\par \par Imaging with a bilateral screening mammogram and sonogram will be due in April 2022, and that will be scheduled today. \par She will return for follow-up and clinical breast exam in April 2022.\par She will continue follow-up with medical oncology as well.\par \par The patient was informed that Dr. Fan Hampton will no longer be practicing here as of the end of August 2021; her care will be continued with the practice.\par \par I spent a total of 20 minutes of face to face time with this patient, greater than 50% of which was spent in counseling and/or coordination of care.\par All of her questions were appropriately answered.\par She knows to call with any concerns.

## 2021-08-18 NOTE — HISTORY OF PRESENT ILLNESS
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  Mother had pancreatic cancer.  Brother has prostate cancer.  No BRCA testing in family.  \par \par Status post insertion of a mediport on 3/5/18.   Status post re-placement of mediport 3/29/18 for neoadjuvant chemotherapy.  Completed Herceptin and Perjeta.  \par \par Status post Left NLOC of three areas and SLNB 8/24/18 demonstrating 0/2 (-); 3. Breast, left 12 o'clock mass, needle localized lumpectomy: Sclerosing large duct papilloma associated with florid duct hyperplasia and coarse stromal calcifications, radial sclerosing lesion (radial scar).  Diffuse histologic changes consistent with prior systemic therapy effect.  Focal healing prior biopsy site change with no residual carcinoma identified.  AJCC 8th Edition Pathologic Stage: ypT0, p(sn)N0, pMx, pCR (pathologic complete response to neoadjuvant therapy).\par Breast, left 11 o'clock mass, needle localized lumpectomy: Radial sclerosing lesion (radial scar) located adjacent to healing prior biopsy site changes.\par Breast, left 9 o'clock mass, needle localized lumpectomy: Sclerosing small duct papilloma containing florid duct hyperplasia with adjacent healing prior biopsy site changes.\par \par \par Left breast fragments of hyalinized fibroadenoma containing coarse stromal calcifications with adjacent benign atrophic fatty breast tissue on stereotactic core biopsy 10/28/19; Posterior (tophat).  Findings are benign concordant and 6-month followup left breast mammogram recommended.\par \par Right breast radial sclerosing lesion on ultrasound guided core biopsy 10/28/19; 2:00 N2, 8 mm (top hat).  Surgical excision recommended.  \par \par Status post Right NLOC and removal of mediport 12/13/19 demonstrating large duct sclerosing papilloma containing focal florid duct hyperplasia and focal atypical lobular hyperplasia.  \par \par ALEK ALMONTE is a 70 year old female patient who presents today in follow up for left breast cancer.\par Since her last visit, she has no new breast related complaints. \par Imaging was done on 04/22/2021, which revealed there is a stable area of architectural distortion at the site of lumpectomy seen in the left breast.  There is an area of benign architectural distortion corresponding to the site of surgery seen in the right breast.  There is no mammographic evidence of malignancy.\par \par She presents today for evaluation and imaging review.

## 2021-09-27 NOTE — ASU PREOP CHECKLIST - BP NONINVASIVE DIASTOLIC (MM HG)
93
General Sunscreen Counseling: I recommended a broad spectrum sunscreen with a SPF of 30 or higher.  I explained that SPF 30 sunscreens block approximately 97 percent of the sun's harmful rays.  Sunscreens should be applied at least 15 minutes prior to expected sun exposure and then every 2 hours after that as long as sun exposure continues. If swimming or exercising sunscreen should be reapplied every 45 minutes to an hour after getting wet or sweating.  One ounce, or the equivalent of a shot glass full of sunscreen, is adequate to protect the skin not covered by a bathing suit. I also recommended a lip balm with a sunscreen as well. Sun protective clothing can be used in lieu of sunscreen but must be worn the entire time you are exposed to the sun's rays.
Detail Level: Detailed

## 2022-01-26 ENCOUNTER — LABORATORY RESULT (OUTPATIENT)
Age: 72
End: 2022-01-26

## 2022-01-26 ENCOUNTER — OUTPATIENT (OUTPATIENT)
Dept: OUTPATIENT SERVICES | Facility: HOSPITAL | Age: 72
LOS: 1 days | Discharge: HOME | End: 2022-01-26

## 2022-01-26 ENCOUNTER — APPOINTMENT (OUTPATIENT)
Dept: HEMATOLOGY ONCOLOGY | Facility: CLINIC | Age: 72
End: 2022-01-26
Payer: MEDICARE

## 2022-01-26 VITALS
SYSTOLIC BLOOD PRESSURE: 133 MMHG | TEMPERATURE: 97.8 F | RESPIRATION RATE: 12 BRPM | HEIGHT: 66 IN | OXYGEN SATURATION: 98 % | HEART RATE: 85 BPM | BODY MASS INDEX: 27.97 KG/M2 | DIASTOLIC BLOOD PRESSURE: 95 MMHG | WEIGHT: 174 LBS

## 2022-01-26 DIAGNOSIS — R93.3 ABNORMAL FINDINGS ON DIAGNOSTIC IMAGING OF OTHER PARTS OF DIGESTIVE TRACT: Chronic | ICD-10-CM

## 2022-01-26 DIAGNOSIS — R92.1 MAMMOGRAPHIC CALCIFICATION FOUND ON DIAGNOSTIC IMAGING OF BREAST: Chronic | ICD-10-CM

## 2022-01-26 DIAGNOSIS — Z98.890 OTHER SPECIFIED POSTPROCEDURAL STATES: Chronic | ICD-10-CM

## 2022-01-26 DIAGNOSIS — Z90.49 ACQUIRED ABSENCE OF OTHER SPECIFIED PARTS OF DIGESTIVE TRACT: Chronic | ICD-10-CM

## 2022-01-26 DIAGNOSIS — Z87.2 PERSONAL HISTORY OF DISEASES OF THE SKIN AND SUBCUTANEOUS TISSUE: Chronic | ICD-10-CM

## 2022-01-26 DIAGNOSIS — Z90.89 ACQUIRED ABSENCE OF OTHER ORGANS: Chronic | ICD-10-CM

## 2022-01-26 PROCEDURE — 99213 OFFICE O/P EST LOW 20 MIN: CPT

## 2022-02-06 NOTE — ASSESSMENT
[FreeTextEntry1] : 1. Poorly differentiated IDC of the left breast, ER/ND negative and Her-2 positive, with large tumor mass and FDG avid left axillary adenopathy, clinical stage T3N1, s/p neoadjuvant chemotherapy followed by left lumpectomy and SLNB, achieved pCR, s/p adjuvant WBI.\par 2. Right breast ALH, sclerosing papilloma and proliferative type fibrocystic changes, s/p lumpectomy.\par  \par Recommendation:\par -- Breast exam today did not reveal palpable abnormality. She will continue annual screening mammo. \par -- Order blood work for CBC, CMP and tumor markers.\par -- followup with Dr. Morataya as scheduled. \par -- RTO for followup visit in 6 months.\par \par Case was seen and examined with Dr. Lynne  who agreed with the assessment and plan.\par \par

## 2022-02-06 NOTE — PHYSICAL EXAM
[Restricted in physically strenuous activity but ambulatory and able to carry out work of a light or sedentary nature] : Status 1- Restricted in physically strenuous activity but ambulatory and able to carry out work of a light or sedentary nature, e.g., light house work, office work [Normal] : affect appropriate [de-identified] : Left breast lumpectomy surgical incision healed well. No tenderness. left SLNB scar healed well.

## 2022-02-06 NOTE — HISTORY OF PRESENT ILLNESS
[Patient] : the patient [de-identified] : 66 yo female is here for f/u visit and chemotherapy. She was recently diagnosed left breast cancer. She had screening mammogram in 06/2015 and it was normal. In Jan 2018 she noticed a lump in left breast. A diagnostic mammo on 1/18/2018 showed a  2.1  cm  mass In  the  region  of  patient's  palpable  abnormality.  No  other  suspicious  masses,  areas of  architectural  distortion  or  cluster  of  microcalcifications  in  either  breast.  Evaluation  of  the  left  axilla  demonstrates  normal-appearing  lymph  nodes.\par   \par Targeted  Left  Breast  Sonogram showed  \par In  the  region  of  the  patient's  palpable  abnormality  at  the  12:00  location  3  cm  from  the  nipple,  there  is  an  irregular  hypoechoic  mass  measuring  5.2  cm  x  3.2  cm  x  1.2  cm  which  corresponds  to  mammographic  findings. \par Other  masses  also  identified  within  the  left  breast  and  are  as  follows: 6:00  location  2  cm  from  the  nipple  measuring  4.1  cm  x  2.4  cm  x  1.4  cm,  9-10  o'clock  location  4  cm  from  the  nipple  measuring  2.1  cm  1.4  cm  x  0.4  cm  and  11:00  location  5  to  6  cm  from  the  nipple  measuring  1.3  cm  x  2.1  cm  x  0.4  cm.   An  ultrasound-guided  core  biopsy  of  the  each  mass  is  recommended\par \par She then had USG guided biopsies of those lesions. Pathology showed \par Site  A:  Left  breast,  11:00  N5-6. \par Intraductal  papilloma  containing  florid  duct  hyperplasia. \par   \par Site  B:  Left  breast  9-10  o'clock  N4. \par -Complex  sclerosing  lesion  (radial  scar)  containing  focal  florid  duct  hyperplasia.  \par -Atrophic  breast  tissue  associated  with  coarse  stromal  calcifications. \par  Site  C:  Left  Breast  6:00  N2 \par -Benign  breast  tissue  with  proliferative  type  fibrocystic  changes  including  dominant  dense  stromal  fibrosis,  focal  florid  duct  hyperplasia,  sclerosing  adenosis,  macrocyst  wall  fragments,  columnar  cell  change/hyperplasia  without  atypia,  and  rare  microcalcifications  present  in  benign  ducts. 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  Surgical  and  oncologic  follow-up  for  the  biopsy-proven  12:00  carcinoma  is  recommended.    Surgical  planning  for  excision  of  the  11:00  and  9-10  o'clock  high  risk  lesions  also  recommended\par \par On 2/13/2018, PET/CT  showed abnormal increased uptake is seen in multiple areas in the left breast. Max SUV 13.92 in the superior breast mass near surgical clip findings are \par consistent with biopsy proven malignancy in the superior mass. Remaining of the abnormal areas of increased uptake could be malignancy and/or postinflammatory nature. Correlate the findings with the biopsy reports.  There are PET positive left axillary lymph nodes with a max SUV 2.56. Findings could be postinflammatory and/or malignancy. \par No other areas of abnormal increased uptake is seen to suggest biologic tumor activity. \par \par On 2/15/18,  MRI breast was done. the report is still pending.\par \par The patient saw Dr. Hampton for surgical consultation. In light of large Her-2 positive tumor, she was given an option to have neoadjuvant chemotherapy followed by surgery. Monica is here with her sister for consultation of neoadjuvant systemic therapy. [de-identified] : Monica has completed neoadjuvant chemotherapy with TCHP x 6 times.  She responded well and her left breast mass has reduced significantly. She complains fatigue, \par She experienced side effects from the chemotherapy including diarrhea, poor appetite and insomnia. These symptoms have got better since chemo completed.\par \par On 7/27/18, she had PET/CT which showed No pathologic FDG uptake to suggest definite biologic tumor activity. All previously seen pathologic FDG uptake in the left breast has resolved. Previously seen mild FDG uptake in left axillary nodes has resolved. Findings are consistent with good treatment response. Mildly FDG avid portacaval lymph node, SUV max 5.7, is nonspecific but less likely to represent biologic tumor activity. This can be reassessed on follow-up. \par \par On 7/23/18, she had b/l breast MRI which showed Interval decrease in size of left breast 12:00 axis biopsy-proven invasive ductal carcinoma; currently it measures up to 1.0 cm, previously measuring up to 3.2 cm. \par \par 9/7/18:\par The patient is here today for followup and treatment. She underwent NLOC left lumpectomy and SLNB on 8/24/18. The pathology showed no evidence of residual disease and 2 SLN negative. She recovered well from surgery.\par She still has loose bowel movement sometimes. her appetite is improving.\par \par 10/19/2018\par Patient is here for a follow up prior to getting treatment with Herceptin and Perjeta. Her lumpectomy scars have healed up well and she has some mild tenderness. It assured that it will get better. She was concerned about irregularity at incision site and again we discussed the mammography findings which showed post lumpectomy changes ( 10/2018) and that it is scar tissue. \par She had mapping done for radiation, yet to start though. \par Her loose bowel movements have improved as well. \par She offers no other complaints.\par \par 12/28/18:\par The patient is here for followup visit. She completed radiation in 11/2018. She feels well and does not have new complains. In 10/2018, she had b/l dx mammo. There was no suspicious finding. In 11/2018, she had 2D echo. Her LVEF was 50-55%.\par \par 2/8/19: Pt is here for a follow-up visit. She is doing well on herceptin and perjeta. Last echo from Nov 2018 showed EF of 55-65%. No fresh complaints. Last US from Jan 2019 showed:\par Oval, hypoechoic, circumscribed, parallel mass in the right breast, 4:00  axis, 2 to 3 cm from the nipple measures 0.9 x 0.4 x 1.1 cm. This is not  significantly changed in size from 2/26/2018 where it measured 1.0 x 0.4  x 1.1 cm, and was previously biopsy proven to be benign.\par   Additional oval, circumscribed, hypoechoic mass in the right breast, 2:00  axis, 7 cm from the nipple measures 1.7 x 1.2 x 0.5 cm. This was likely  present on the prior MRI of 2/15/2018 (series 8, image 49) and is also  likely benign. Continued sonographic follow-up is recommended.\par   Benign post lumpectomy changes are seen in the left breast, 12:00 axis.  No suspicious solid or cystic masses are seen in the left breast.\par \par 4/12/19:\par The patient is here for f/u visit. She complains RUQ pain for a few weeks. She saw Dr. Manzo and had abdominal sonogram which showed Cholelithiasis without sonographic evidence of acute cholecystitis. CBD dilatation (11 mm), new since March 2016. Recommend correlation with laboratory tests. If indicated, MRCP can be obtained to exclude CBD \par obstruction or stone. She has been taking Cipro and Flagyl. She did not have any fever. She is given a referral for MRCP but she has not made an appointment yet.\par She has b/l dx mammo on 4/219. There was no suspicious finding.\par \par 7/26/2019: \par The patient is here for follow up. Since last visit , she had a cholecystectomy which showed chronic cholecystitis and cholelithiasis. Her bilirubin and liver enzymes normalized after surgery. She is also switched to venlafaxine for her anxiety. She is feeling much better. She is trying to exercise and regain energy. She complains left axillary pain at the site of SNLBx . She is also concerned about multiple lesions of her breasts that were proven to be benign in the past. She is due for a mammogram in October 2019.\par The patient also reported palpitations when she gets up quickly from a seating or a sleeping position , and bilateral ankle swelling by the end of the day . She will see her PMD to adjust her medications. \par \par 11/11/19\par Patient is here today for follow up visit.  She had a mammogram 10/2019 which showed calcification in left breast and a radial sclerosing lesion on biopsy of right breast.  She will have a lumpectomy on right breast for this high risk benign lesion and port removal on same day-12/11/19 by Dr. Hampton.  She offers no further complaints.\par \par 5/4/2020:\par Patient is evaluated via telehealth. She has IDC of the left breast, ER/IA negative and Her-2 positive, with large tumor mass and FDG avid left axillary adenopathy, clinical stage T3N1, s/p neoadjuvant chemotherapy followed by left lumpectomy and SLNBin 8/2018, achieved pCR - followed by  maintenance herceptin and Perjeta. She received adjuvant breast radiotherapy. She had a mammogram 10/2019 which showed calcification in left breast and a radial sclerosing lesion on biopsy of right breast. 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 She recovered well from surgery. She does not have any new complains.\par \par 8/3/2020\par 68 yo female is here today for follow up visit. She was diagnosed with ER/IA neg and her-2 pos IDC of the left breast, s/p neoadjuvant chemotherapy with TCHP x 6 followed by by left lumpectomy and SLNB in 8/2018, achieved pCR, s/p adjuvant WBI and completed maintenance Herceptin and Perjeta, remains KASHMIR.  In 12/2019 she had right lumpectomy and was found to have focal atypical lobular hyperplasia (ALH), and proliferative type fibrocystic changes associated with calcifications from a previous biopsy site. Primary prevention with risk reduction agent was discussed with her previously. She was reluctant.  Today, she c/o worsening pain to her left shoulder area x few months.  No trauma to site reported.  \par Last mammogram / breast US were done 4/2020 which showed no mammographic or sonographic evidence of malignancy. Post lumpectomy changes of the left breast. Postsurgical distortion of the right breast. \par Pt requests for genetic testing to be done today.  Family history includes;  mother- pancreatic ca at 60 yo, maternal aunt- breast cancer at 70's yo, paternal aunt- breast cancer at 70's yo, brother- prostate cancer at 62 yo, cousin- bone cancer at 12 yo.\par \par 01/25/2021: MONICA is here for follow up visit for ER/IA neg and her-2 pos IDC of the left breast, s/p neoadjuvant chemotherapy with TCHP x 6 followed by by left lumpectomy and SLNB in 8/2018, achieved pCR, s/p adjuvant WBI and completed maintenance Herceptin and Perjeta, remains KASHMIR.  In 12/2019 she had right lumpectomy and was found to have focal atypical lobular hyperplasia (ALH), and proliferative type fibrocystic changes associated with calcifications from a previous biopsy site. Primary prevention with risk reduction agent was discussed with her previously. She was reluctant and opted to continue on surveillance. Pain of the left shoulder resolved: xray showed No evidence of acute osseous abnormalities. No sclerotic or lytic bone lesion. Mild AC joint and mild glenohumeral joint degenerative changes. Last diagnostic mammogram and US of left breast completed in Oct 2020 showed stable benign post lumpectomy and postradiation changes in the left breast and no significant change to suggest malignancy. She denies any new breast symptoms at this time. Labs reviewed from [facility].\par \par 7/29/21:\par MONICA is here for follow up visit for ER/IA neg and her-2 pos IDC of the left breast, s/p neoadjuvant chemotherapy with TCHP x 6 followed by by left lumpectomy and SLNB in 8/2018, achieved pCR, s/p adjuvant WBI and completed maintenance Herceptin and Perjeta, remains KASHMIR.  In 12/2019 she had right lumpectomy and was found to have focal atypical lobular hyperplasia (ALH), and proliferative type fibrocystic changes associated with calcifications from a previous biopsy site. She opted not to take risk reduction agent. B/L Breast Mammogram  from 4/22/21 showed no mammographic evidence of malignancy. Today patient reports feeling well. She complains pain in her left breast sometimes.  \par \par 01/26/2022: MONICA is here for follow up visit for ER/IA neg and her-2 pos IDC of the left breast, s/p neoadjuvant chemotherapy with TCHP x 6 followed by by left lumpectomy and SLNB in 8/2018, achieved pCR, s/p adjuvant WBI and completed maintenance Herceptin and Perjeta, remains KASHMIR.  In 12/2019 she had right lumpectomy and was found to have focal atypical lobular hyperplasia (ALH), and proliferative type fibrocystic changes associated with calcifications from a previous biopsy site. She opted not to take risk reduction agent. B/L Breast Mammogram  from 4/22/21 showed no mammographic evidence of malignancy; she will repeat imaging in April. she denies any new breast changes. She continues to have intermittent nerve pain to the surgical bed. \par \par

## 2022-02-22 DIAGNOSIS — N60.91 UNSPECIFIED BENIGN MAMMARY DYSPLASIA OF RIGHT BREAST: ICD-10-CM

## 2022-02-22 DIAGNOSIS — C50.912 MALIGNANT NEOPLASM OF UNSPECIFIED SITE OF LEFT FEMALE BREAST: ICD-10-CM

## 2022-04-27 ENCOUNTER — RESULT REVIEW (OUTPATIENT)
Age: 72
End: 2022-04-27

## 2022-04-27 ENCOUNTER — OUTPATIENT (OUTPATIENT)
Dept: OUTPATIENT SERVICES | Facility: HOSPITAL | Age: 72
LOS: 1 days | Discharge: HOME | End: 2022-04-27
Payer: MEDICARE

## 2022-04-27 DIAGNOSIS — Z90.49 ACQUIRED ABSENCE OF OTHER SPECIFIED PARTS OF DIGESTIVE TRACT: Chronic | ICD-10-CM

## 2022-04-27 DIAGNOSIS — Z98.890 OTHER SPECIFIED POSTPROCEDURAL STATES: Chronic | ICD-10-CM

## 2022-04-27 DIAGNOSIS — R92.2 INCONCLUSIVE MAMMOGRAM: ICD-10-CM

## 2022-04-27 DIAGNOSIS — Z87.2 PERSONAL HISTORY OF DISEASES OF THE SKIN AND SUBCUTANEOUS TISSUE: Chronic | ICD-10-CM

## 2022-04-27 DIAGNOSIS — R92.1 MAMMOGRAPHIC CALCIFICATION FOUND ON DIAGNOSTIC IMAGING OF BREAST: Chronic | ICD-10-CM

## 2022-04-27 DIAGNOSIS — R93.3 ABNORMAL FINDINGS ON DIAGNOSTIC IMAGING OF OTHER PARTS OF DIGESTIVE TRACT: Chronic | ICD-10-CM

## 2022-04-27 DIAGNOSIS — Z85.3 PERSONAL HISTORY OF MALIGNANT NEOPLASM OF BREAST: ICD-10-CM

## 2022-04-27 DIAGNOSIS — Z90.89 ACQUIRED ABSENCE OF OTHER ORGANS: Chronic | ICD-10-CM

## 2022-04-27 DIAGNOSIS — Z12.31 ENCOUNTER FOR SCREENING MAMMOGRAM FOR MALIGNANT NEOPLASM OF BREAST: ICD-10-CM

## 2022-04-27 DIAGNOSIS — N28.1 CYST OF KIDNEY, ACQUIRED: ICD-10-CM

## 2022-04-27 PROCEDURE — 77063 BREAST TOMOSYNTHESIS BI: CPT | Mod: 26

## 2022-04-27 PROCEDURE — 76770 US EXAM ABDO BACK WALL COMP: CPT | Mod: 26

## 2022-04-27 PROCEDURE — 76641 ULTRASOUND BREAST COMPLETE: CPT | Mod: 26,50

## 2022-04-27 PROCEDURE — 77067 SCR MAMMO BI INCL CAD: CPT | Mod: 26

## 2022-05-02 ENCOUNTER — RESULT REVIEW (OUTPATIENT)
Age: 72
End: 2022-05-02

## 2022-05-02 ENCOUNTER — OUTPATIENT (OUTPATIENT)
Dept: OUTPATIENT SERVICES | Facility: HOSPITAL | Age: 72
LOS: 1 days | Discharge: HOME | End: 2022-05-02
Payer: MEDICARE

## 2022-05-02 DIAGNOSIS — Z90.89 ACQUIRED ABSENCE OF OTHER ORGANS: Chronic | ICD-10-CM

## 2022-05-02 DIAGNOSIS — R93.3 ABNORMAL FINDINGS ON DIAGNOSTIC IMAGING OF OTHER PARTS OF DIGESTIVE TRACT: Chronic | ICD-10-CM

## 2022-05-02 DIAGNOSIS — Z98.890 OTHER SPECIFIED POSTPROCEDURAL STATES: Chronic | ICD-10-CM

## 2022-05-02 DIAGNOSIS — Z87.2 PERSONAL HISTORY OF DISEASES OF THE SKIN AND SUBCUTANEOUS TISSUE: Chronic | ICD-10-CM

## 2022-05-02 DIAGNOSIS — R92.1 MAMMOGRAPHIC CALCIFICATION FOUND ON DIAGNOSTIC IMAGING OF BREAST: Chronic | ICD-10-CM

## 2022-05-02 DIAGNOSIS — R92.8 OTHER ABNORMAL AND INCONCLUSIVE FINDINGS ON DIAGNOSTIC IMAGING OF BREAST: ICD-10-CM

## 2022-05-02 DIAGNOSIS — Z90.49 ACQUIRED ABSENCE OF OTHER SPECIFIED PARTS OF DIGESTIVE TRACT: Chronic | ICD-10-CM

## 2022-05-02 PROCEDURE — 76642 ULTRASOUND BREAST LIMITED: CPT | Mod: 26,50

## 2022-05-03 ENCOUNTER — APPOINTMENT (OUTPATIENT)
Dept: BREAST CENTER | Facility: CLINIC | Age: 72
End: 2022-05-03
Payer: MEDICARE

## 2022-05-03 VITALS
TEMPERATURE: 98.2 F | HEIGHT: 66 IN | BODY MASS INDEX: 27.97 KG/M2 | DIASTOLIC BLOOD PRESSURE: 84 MMHG | WEIGHT: 174 LBS | SYSTOLIC BLOOD PRESSURE: 141 MMHG

## 2022-05-03 PROCEDURE — 99213 OFFICE O/P EST LOW 20 MIN: CPT

## 2022-05-03 NOTE — REASON FOR VISIT
[Follow-Up: _____] : a [unfilled] follow-up visit [FreeTextEntry1] : h/o Stage IIA left breast cancer; imaging review.

## 2022-05-03 NOTE — PHYSICAL EXAM
[Normocephalic] : normocephalic [Atraumatic] : atraumatic [No Supraclavicular Adenopathy] : no supraclavicular adenopathy [No dominant masses] : no dominant masses in right breast  [No dominant masses] : no dominant masses left breast [No Nipple Discharge] : no left nipple discharge [No Rashes] : no rashes [No Ulceration] : no ulceration [Breast Nipple Inversion] : nipples not inverted [Breast Nipple Retraction] : nipples not retracted [de-identified] : well healed surgical scars.\par Palpable scar tissue.  [de-identified] : lower axillary - palpable small (approx 1cm) likely sebaceous cyst. [de-identified] : No axillary lymphadenopathy appreciated. [de-identified] : No axillary lymphadenopathy appreciated.

## 2022-05-03 NOTE — HISTORY OF PRESENT ILLNESS
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  Mother had pancreatic cancer.  Brother has prostate cancer.  No BRCA testing in family.  \par \par Status post insertion of a mediport on 3/5/18.   Status post re-placement of mediport 3/29/18 for neoadjuvant chemotherapy.  Completed Herceptin and Perjeta.  \par \par Status post Left NLOC of three areas and SLNB 8/24/18 demonstrating 0/2 (-); 3. Breast, left 12 o'clock mass, needle localized lumpectomy: Sclerosing large duct papilloma associated with florid duct hyperplasia and coarse stromal calcifications, radial sclerosing lesion (radial scar).  Diffuse histologic changes consistent with prior systemic therapy effect.  Focal healing prior biopsy site change with no residual carcinoma identified.  AJCC 8th Edition Pathologic Stage: ypT0, p(sn)N0, pMx, pCR (pathologic complete response to neoadjuvant therapy).\par Breast, left 11 o'clock mass, needle localized lumpectomy: Radial sclerosing lesion (radial scar) located adjacent to healing prior biopsy site changes.\par Breast, left 9 o'clock mass, needle localized lumpectomy: Sclerosing small duct papilloma containing florid duct hyperplasia with adjacent healing prior biopsy site changes.\par \par s/p adjuvant RT w/ Dr. Kennedy Muñoz.\par \par Left breast fragments of hyalinized fibroadenoma containing coarse stromal calcifications with adjacent benign atrophic fatty breast tissue on stereotactic core biopsy 10/28/19; Posterior (tophat).  Findings are benign concordant and 6-month followup left breast mammogram recommended.\par \par Right breast radial sclerosing lesion on ultrasound guided core biopsy 10/28/19; 2:00 N2, 8 mm (top hat).  Surgical excision recommended.  \par \par Status post Right NLOC and removal of mediport 12/13/19 demonstrating large duct sclerosing papilloma containing focal florid duct hyperplasia and focal atypical lobular hyperplasia.  \par \par ALEK ALMONTE is a 71 year old female patient who presents today in follow up for Stage IIA left breast cancer.\par Since her last visit, she has no new breast related complaints. \par \par Her most recent imaging:\par B/L Screening Mammo - 04/27/2022:\par -There are scattered areas of fibroglandular density.\par -There is an area of architectural distortion at the site of lumpectomy seen in the left breast.\par -There is an area of benign architectural distortion corresponding to the site of surgery seen in the right breast.\par -There is no mammographic evidence of malignancy.\par BI-RADS Category 2:  Benign\par \par B/L Breast Sono - 04/27/2022:\par Right breast:\par -At the 2:00 position 7 cm from the nipple, there is a stable hypoechoic mass measuring 1.9 x 1.3 x 0.4 cm, benign.\par -At the 4:00 position 2 cm from the nipple, there is a previously biopsied benign mass measuring 1.1 x 0.3 x 0.8 cm.\par -At the 11:00 position periareolar region, there is a heterogeneous mass measuring 3.0 x 1.0 x 1.9 cm, indeterminate. \par -No axillary adenopathy.\par Left breast:\par -At the 6:00 position 2 cm from the nipple (periareolar region), there is a heterogeneous mass measuring 3.5 x 0.5 x 1.8 cm. Real-time ultrasound is recommended.\par -Benign scar is noted at the lumpectomy site at the 12:00 position.\par -No axillary adenopathy.\par BI-RADS Category 0:  Incomplete: Needs Additional Imaging Evaluation\par \par B/L Breast Sono - 05/02/2022:\par -Right breast at the 11:00 location periareolar region, there appears to be a single duct with debris measuring 3.0 cm x 1.0 cm x 1.9 cm\par -6:00 location 2 cm from the nipple of the left breast measuring 3.5 cm x 0.5 cm x 1.7 cm. \par An ultrasound-guided core biopsy of each area is recommended.\par BI-RADS Category 4: Suspicious\par \par The recommended biopsies have not yet been performed.

## 2022-05-03 NOTE — ASSESSMENT
[FreeTextEntry1] : ALEK is a akbar 71 year old patient who presented today in follow up for a history of Stage IIA left breast cancer.  \par She has been doing well with no new breast related complaints. \par \par Her most recent imaging:\par B/L Screening Mammo - 04/27/2022:\par -There are scattered areas of fibroglandular density.\par -There is an area of architectural distortion at the site of lumpectomy seen in the left breast.\par -There is an area of benign architectural distortion corresponding to the site of surgery seen in the right breast.\par -There is no mammographic evidence of malignancy.\par BI-RADS Category 2:  Benign\par \par B/L Breast Sono - 04/27/2022:\par Right breast:\par -At the 2:00 position 7 cm from the nipple, there is a stable hypoechoic mass measuring 1.9 x 1.3 x 0.4 cm, benign.\par -At the 4:00 position 2 cm from the nipple, there is a previously biopsied benign mass measuring 1.1 x 0.3 x 0.8 cm.\par -At the 11:00 position periareolar region, there is a heterogeneous mass measuring 3.0 x 1.0 x 1.9 cm, indeterminate. \par -No axillary adenopathy.\par Left breast:\par -At the 6:00 position 2 cm from the nipple (periareolar region), there is a heterogeneous mass measuring 3.5 x 0.5 x 1.8 cm. Real-time ultrasound is recommended.\par -Benign scar is noted at the lumpectomy site at the 12:00 position.\par -No axillary adenopathy.\par BI-RADS Category 0:  Incomplete: Needs Additional Imaging Evaluation\par \par B/L Breast Sono - 05/02/2022:\par -Right breast at the 11:00 location periareolar region, there appears to be a single duct with debris measuring 3.0 cm x 1.0 cm x 1.9 cm\par -6:00 location 2 cm from the nipple of the left breast measuring 3.5 cm x 0.5 cm x 1.7 cm. \par An ultrasound-guided core biopsy of each area is recommended.\par BI-RADS Category 4: Suspicious \par Physical exam was unrevealing today.\par \par Recommended bilateral breast biopsies have not yet been performed; they are scheduled for tomorrow 05/04/2022.\par We will discuss results when they become available and further recommendations will be made.\par \par

## 2022-05-03 NOTE — DATA REVIEWED
[FreeTextEntry1] : B/L Screening Mammo - 04/27/2022:\par MAMMOGRAM FINDINGS:\par Mammography was performed including the following views: bilateral craniocaudal with tomosynthesis, bilateral mediolateral oblique with tomosynthesis.  The examination includes digital synthetic 2D and digital tomosynthesis 3D images. Additional imaging analysis was performed using CAD (computer-aided detection) software.\par \par There are scattered areas of fibroglandular density.\par \par Finding 1:  There is an area of architectural distortion at the site of lumpectomy seen in the left breast.\par \par Finding 2:  There is an area of benign architectural distortion corresponding to the site of surgery seen in the right breast.\par \par No suspicious mass, grouping of calcifications, or other abnormality is identified.\par \par IMPRESSION:\par There is no mammographic evidence of malignancy.\par \par RECOMMENDATION:\par Unless otherwise indicated by clinical findings, annual screening mammography recommended.\par \par ASSESSMENT:\par BI-RADS Category 2:  Benign\par \par \par B/L Breast Sono - 04/27/2022:\par Findings:\par \par Ultrasound:\par \par Bilateral whole breast ultrasound was performed.\par \par Right breast:\par At the 2:00 position 7 cm from the nipple, there is a stable hypoechoic mass measuring 1.9 x 1.3 x 0.4 cm, benign.\par \par At the 4:00 position 2 cm from the nipple, there is a previously biopsied benign mass measuring 1.1 x 0.3 x 0.8 cm.\par \par At the 11:00 position periareolar region, there is a heterogeneous mass measuring 3.0 x 1.0 x 1.9 cm, indeterminate. Real-time ultrasound is recommended.\par \par No axillary adenopathy.\par \par Left breast:\par At the 6:00 position 2 cm from the nipple (periareolar region), there is a heterogeneous mass measuring 3.5 x 0.5 x 1.8 cm. Real-time ultrasound is recommended.\par \par Benign scar is noted at the lumpectomy site at the 12:00 position.\par \par No axillary adenopathy.\par \par Impression: Bilateral indeterminate periareolar masses for which real-time ultrasound is recommended.\par \par RECOMMENDATION:\par Patient will be recalled for additional views.\par \par ASSESSMENT:\par BI-RADS Category 0:  Incomplete: Needs Additional Imaging Evaluation\par \par \par B/L Breast Sono - 05/02/2022:\par Targeted Bilateral Breast Sonogram:\par \par In the right breast at the 11:00 location periareolar region, there appears to be a single duct with debris measuring 3.0 cm x 1.0 cm x 1.9 cm as well as at the 6:00 location 2 cm from the nipple of the left breast measuring 3.5 cm x 0.5 cm x 1.7 cm. An ultrasound-guided core biopsy of each area is recommended.\par \par Impression: Indeterminate right breast lesion at the 11:00 location periareolar region and left breast at the 6:00 location 2 cm from the nipple as above. An ultrasound guided core biopsy is recommended.\par \par Recommendation: Ultrasound guided biopsy.\par \par BI-RADS Category 4: Suspicious\par \par

## 2022-05-04 ENCOUNTER — RESULT REVIEW (OUTPATIENT)
Age: 72
End: 2022-05-04

## 2022-05-04 ENCOUNTER — OUTPATIENT (OUTPATIENT)
Dept: OUTPATIENT SERVICES | Facility: HOSPITAL | Age: 72
LOS: 1 days | Discharge: HOME | End: 2022-05-04
Payer: MEDICARE

## 2022-05-04 DIAGNOSIS — Z90.49 ACQUIRED ABSENCE OF OTHER SPECIFIED PARTS OF DIGESTIVE TRACT: Chronic | ICD-10-CM

## 2022-05-04 DIAGNOSIS — R92.1 MAMMOGRAPHIC CALCIFICATION FOUND ON DIAGNOSTIC IMAGING OF BREAST: Chronic | ICD-10-CM

## 2022-05-04 DIAGNOSIS — Z87.2 PERSONAL HISTORY OF DISEASES OF THE SKIN AND SUBCUTANEOUS TISSUE: Chronic | ICD-10-CM

## 2022-05-04 DIAGNOSIS — Z90.89 ACQUIRED ABSENCE OF OTHER ORGANS: Chronic | ICD-10-CM

## 2022-05-04 DIAGNOSIS — Z98.890 OTHER SPECIFIED POSTPROCEDURAL STATES: Chronic | ICD-10-CM

## 2022-05-04 DIAGNOSIS — R93.3 ABNORMAL FINDINGS ON DIAGNOSTIC IMAGING OF OTHER PARTS OF DIGESTIVE TRACT: Chronic | ICD-10-CM

## 2022-05-04 PROCEDURE — 88305 TISSUE EXAM BY PATHOLOGIST: CPT | Mod: 26

## 2022-05-04 PROCEDURE — 77066 DX MAMMO INCL CAD BI: CPT | Mod: 26

## 2022-05-04 PROCEDURE — 19084 BX BREAST ADD LESION US IMAG: CPT | Mod: RT

## 2022-05-04 PROCEDURE — 19083 BX BREAST 1ST LESION US IMAG: CPT | Mod: LT

## 2022-05-04 RX ORDER — AMOXICILLIN AND CLAVULANATE POTASSIUM 875; 125 MG/1; MG/1
875-125 TABLET, COATED ORAL
Qty: 14 | Refills: 1 | Status: ACTIVE | COMMUNITY
Start: 2022-05-04 | End: 1900-01-01

## 2022-05-05 LAB — SURGICAL PATHOLOGY STUDY: SIGNIFICANT CHANGE UP

## 2022-05-06 ENCOUNTER — NON-APPOINTMENT (OUTPATIENT)
Age: 72
End: 2022-05-06

## 2022-05-09 DIAGNOSIS — N64.2 ATROPHY OF BREAST: ICD-10-CM

## 2022-05-09 DIAGNOSIS — R92.0 MAMMOGRAPHIC MICROCALCIFICATION FOUND ON DIAGNOSTIC IMAGING OF BREAST: ICD-10-CM

## 2022-05-09 DIAGNOSIS — N60.32 FIBROSCLEROSIS OF LEFT BREAST: ICD-10-CM

## 2022-05-09 DIAGNOSIS — N63.20 UNSPECIFIED LUMP IN THE LEFT BREAST, UNSPECIFIED QUADRANT: ICD-10-CM

## 2022-05-09 DIAGNOSIS — D24.1 BENIGN NEOPLASM OF RIGHT BREAST: ICD-10-CM

## 2022-06-03 NOTE — ASU PATIENT PROFILE, ADULT - NS PRO ABUSE SCREEN AFRAID ANYONE YN
From: Janina JOHNSON  To: Bhumika Dewey  Sent: 5/17/2022 8:35 AM CDT  Subject: cardiac event monitor    Good morning Bhumika and Viki.    Dr. Dee wanted me to let you know the cardiac event monitor was normal.    Thank you,  Janina VILLAFANA   no

## 2022-06-21 ENCOUNTER — APPOINTMENT (OUTPATIENT)
Dept: BREAST CENTER | Facility: CLINIC | Age: 72
End: 2022-06-21
Payer: MEDICARE

## 2022-06-21 VITALS
BODY MASS INDEX: 28.28 KG/M2 | DIASTOLIC BLOOD PRESSURE: 86 MMHG | HEART RATE: 74 BPM | HEIGHT: 66 IN | WEIGHT: 176 LBS | SYSTOLIC BLOOD PRESSURE: 130 MMHG

## 2022-06-21 DIAGNOSIS — R92.8 OTHER ABNORMAL AND INCONCLUSIVE FINDINGS ON DIAGNOSTIC IMAGING OF BREAST: ICD-10-CM

## 2022-06-21 PROCEDURE — 99212 OFFICE O/P EST SF 10 MIN: CPT

## 2022-06-21 NOTE — PHYSICAL EXAM
[Normocephalic] : normocephalic [Atraumatic] : atraumatic [EOMI] : extra ocular movement intact [Examined in the supine and seated position] : examined in the supine and seated position [Symmetrical] : symmetrical [No dominant masses] : no dominant masses in right breast  [No dominant masses] : no dominant masses left breast [No Nipple Retraction] : no left nipple retraction [No Nipple Discharge] : no left nipple discharge [No Axillary Lymphadenopathy] : no left axillary lymphadenopathy [No Edema] : no edema [No Rashes] : no rashes [No Ulceration] : no ulceration [de-identified] : On physical exam, there are no discrete masses in either breast or axilla. There is no nipple discharge or inversion bilaterally. There are no skin changes bilaterally.\par \par

## 2022-06-21 NOTE — DATA REVIEWED
[FreeTextEntry1] : 5/4/2022 12:03 EDT\par \par Surgical Pathology Report - Auth (Verified)\par \par Specimen(s) Submitted\par 1  Left breast 6:00 N2\par Time obtained: 11:04 am\par Time in formalin: 11:08 am\par 2  Right breast 11:00 retro maki\par Time obtained: 11:17 am\par Time in formalin: 11:21 am\par \par Final Diagnosis\par \par 1.  Breast, left vague mass, ultrasound-guided needle core biopsies:\par - Benign atrophic breast tissue with dominant paucicellular stromal\par fibrosis and rare microcalcifications present in benign ductules.\par \par 2.  Breast, right irregular mass, ultrasound-guided needle core biopsies:\par - Intraductal papilloma.\par - Benign atrophic breast tissue with proliferative type fibrocystic\par changes associated with microcalcifications.\par

## 2022-06-21 NOTE — ASSESSMENT
[FreeTextEntry1] : ALEK is a akbar 71 year old patient who presented today in follow up for a history of Stage IIA left breast cancer.  She present with new dx of right breast intraductal papilloma \par \par Her imaging is as follows:\par 05/04/2022\par Site 1, left breast US guided bx:(mini-cork) \par -Benign atrophic breast tissue with dominant paucicellular stromal fibrosis and rare microcalcifications present in benign ductules.\par Site 2, right breast US guided bx:(stop-light) \par -Intraductal papilloma.\par -Benign atrophic breast tissue with proliferative type fibrocystic changes associated with microcalcifications.\par \par \par Intraductal papillomas without atypia are considered fibroproliferative lesions without atypia.  Patients with these lesions were found to have a slightly increased relative risk of breast cancer compared to the reference population.  However the lesions themselves do not have any malignant potential. \par \par Although newer studies regarding the upgrade rate (to cancer) ranges from 0-14% on surgical excision, with pathologic/radiologic concordance, older studies found a higher upgrade rate.  I have recommended surgical excision for this reason.  Because it is not readily palpable, I will have her undergo a preoperative radiofrequency tag localization.  \par \par The risks and benefits of the procedure were explained to the patient, including bleeding, infection, seroma/hematoma formation, and possible re-operation if the surgical excision yields an upgrade to cancer with positive margins. Informed consent was obtained today.\par \par PLAN:\par -DX: RIGHT BREAST INTRADUCTAL PAPILLOMA\par -OR:RIGHT WLE WITH RF LOC \par -follow up post op

## 2022-06-21 NOTE — HISTORY OF PRESENT ILLNESS
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  Mother had pancreatic cancer.  Brother has prostate cancer.  No BRCA testing in family.  \par \par Status post insertion of a mediport on 3/5/18.   Status post re-placement of mediport 3/29/18 for neoadjuvant chemotherapy.  Completed Herceptin and Perjeta.  \par \par Status post Left NLOC of three areas and SLNB 8/24/18 demonstrating 0/2 (-); 3. Breast, left 12 o'clock mass, needle localized lumpectomy: Sclerosing large duct papilloma associated with florid duct hyperplasia and coarse stromal calcifications, radial sclerosing lesion (radial scar).  Diffuse histologic changes consistent with prior systemic therapy effect.  Focal healing prior biopsy site change with no residual carcinoma identified.  AJCC 8th Edition Pathologic Stage: ypT0, p(sn)N0, pMx, pCR (pathologic complete response to neoadjuvant therapy).\par Breast, left 11 o'clock mass, needle localized lumpectomy: Radial sclerosing lesion (radial scar) located adjacent to healing prior biopsy site changes.\par Breast, left 9 o'clock mass, needle localized lumpectomy: Sclerosing small duct papilloma containing florid duct hyperplasia with adjacent healing prior biopsy site changes.\par \par s/p adjuvant RT w/ Dr. Kennedy Muñoz.\par \par Left breast fragments of hyalinized fibroadenoma containing coarse stromal calcifications with adjacent benign atrophic fatty breast tissue on stereotactic core biopsy 10/28/19; Posterior (tophat).  Findings are benign concordant and 6-month followup left breast mammogram recommended.\par \par Right breast radial sclerosing lesion on ultrasound guided core biopsy 10/28/19; 2:00 N2, 8 mm (top hat).  Surgical excision recommended.  \par \par Status post Right NLOC and removal of mediport 12/13/19 demonstrating large duct sclerosing papilloma containing focal florid duct hyperplasia and focal atypical lobular hyperplasia.  \par \par MONICA ALMONTE is a 71 year old female patient who presents today in follow up for Stage IIA left breast cancer.\par Since her last visit, she has no new breast related complaints. \par \par Her most recent imaging:\par B/L Screening Mammo - 04/27/2022:\par -There are scattered areas of fibroglandular density.\par -There is an area of architectural distortion at the site of lumpectomy seen in the left breast.\par -There is an area of benign architectural distortion corresponding to the site of surgery seen in the right breast.\par -There is no mammographic evidence of malignancy.\par BI-RADS Category 2:  Benign\par \par B/L Breast Sono - 04/27/2022:\par Right breast:\par -At the 2:00 position 7 cm from the nipple, there is a stable hypoechoic mass measuring 1.9 x 1.3 x 0.4 cm, benign.\par -At the 4:00 position 2 cm from the nipple, there is a previously biopsied benign mass measuring 1.1 x 0.3 x 0.8 cm.\par -At the 11:00 position periareolar region, there is a heterogeneous mass measuring 3.0 x 1.0 x 1.9 cm, indeterminate. \par -No axillary adenopathy.\par Left breast:\par -At the 6:00 position 2 cm from the nipple (periareolar region), there is a heterogeneous mass measuring 3.5 x 0.5 x 1.8 cm. Real-time ultrasound is recommended.\par -Benign scar is noted at the lumpectomy site at the 12:00 position.\par -No axillary adenopathy.\par BI-RADS Category 0:  Incomplete: Needs Additional Imaging Evaluation\par \par B/L Breast Sono - 05/02/2022:\par -Right breast at the 11:00 location periareolar region, there appears to be a single duct with debris measuring 3.0 cm x 1.0 cm x 1.9 cm\par -6:00 location 2 cm from the nipple of the left breast measuring 3.5 cm x 0.5 cm x 1.7 cm. \par An ultrasound-guided core biopsy of each area is recommended.\par BI-RADS Category 4: Suspicious\par \par The recommended biopsies have not yet been performed.\par \par INTERVAL HISTORY 6/21/22\par Monica is here to discuss her new dx of RIGHT breast Intraductal papilloma.\par She has no breast related complaints at this time.  She denies any breast pain, has not palpated any new palpable masses in either breast and denies any nipple discharge or retraction.\par \par Her imaging is as follows:\par 05/04/2022\par Site 1, left breast US guided bx:(mini-cork) \par -Benign atrophic breast tissue with dominant paucicellular stromal fibrosis and rare microcalcifications present in benign ductules.\par Site 2, right breast US guided bx:(stop-light) \par -Intraductal papilloma.\par -Benign atrophic breast tissue with proliferative type fibrocystic changes associated with microcalcifications.\par

## 2022-06-23 ENCOUNTER — NON-APPOINTMENT (OUTPATIENT)
Age: 72
End: 2022-06-23

## 2022-06-25 LAB
ALBUMIN SERPL ELPH-MCNC: 4.4 G/DL
ALP BLD-CCNC: 60 U/L
ALT SERPL-CCNC: 37 U/L
ANION GAP SERPL CALC-SCNC: 16 MMOL/L
AST SERPL-CCNC: 29 U/L
BILIRUB DIRECT SERPL-MCNC: <0.2 MG/DL
BILIRUB INDIRECT SERPL-MCNC: >0.1 MG/DL
BILIRUB SERPL-MCNC: 0.3 MG/DL
BUN SERPL-MCNC: 19 MG/DL
CALCIUM SERPL-MCNC: 10.1 MG/DL
CANCER AG15-3 SERPL-ACNC: 22.9 U/ML
CEA SERPL-MCNC: 1.2 NG/ML
CHLORIDE SERPL-SCNC: 106 MMOL/L
CO2 SERPL-SCNC: 20 MMOL/L
CREAT SERPL-MCNC: 0.8 MG/DL
ESTIMATED AVERAGE GLUCOSE: 128 MG/DL
GLUCOSE SERPL-MCNC: 106 MG/DL
HBA1C MFR BLD HPLC: 6.1 %
HCT VFR BLD CALC: 43.7 %
HGB BLD-MCNC: 14.7 G/DL
MCHC RBC-ENTMCNC: 30.6 PG
MCHC RBC-ENTMCNC: 33.6 G/DL
MCV RBC AUTO: 91 FL
PLATELET # BLD AUTO: 196 K/UL
PMV BLD: 10 FL
POTASSIUM SERPL-SCNC: 5.6 MMOL/L
PROT SERPL-MCNC: 7.1 G/DL
RBC # BLD: 4.8 M/UL
RBC # FLD: 12.8 %
SODIUM SERPL-SCNC: 142 MMOL/L
WBC # FLD AUTO: 5.88 K/UL

## 2022-07-13 ENCOUNTER — RESULT REVIEW (OUTPATIENT)
Age: 72
End: 2022-07-13

## 2022-07-13 ENCOUNTER — OUTPATIENT (OUTPATIENT)
Dept: OUTPATIENT SERVICES | Facility: HOSPITAL | Age: 72
LOS: 1 days | Discharge: HOME | End: 2022-07-13

## 2022-07-13 VITALS
TEMPERATURE: 100 F | DIASTOLIC BLOOD PRESSURE: 78 MMHG | OXYGEN SATURATION: 97 % | WEIGHT: 166.89 LBS | SYSTOLIC BLOOD PRESSURE: 121 MMHG | RESPIRATION RATE: 15 BRPM | HEIGHT: 67 IN | HEART RATE: 92 BPM

## 2022-07-13 DIAGNOSIS — Z98.890 OTHER SPECIFIED POSTPROCEDURAL STATES: Chronic | ICD-10-CM

## 2022-07-13 DIAGNOSIS — N60.91 UNSPECIFIED BENIGN MAMMARY DYSPLASIA OF RIGHT BREAST: ICD-10-CM

## 2022-07-13 DIAGNOSIS — R92.1 MAMMOGRAPHIC CALCIFICATION FOUND ON DIAGNOSTIC IMAGING OF BREAST: Chronic | ICD-10-CM

## 2022-07-13 DIAGNOSIS — Z90.89 ACQUIRED ABSENCE OF OTHER ORGANS: Chronic | ICD-10-CM

## 2022-07-13 DIAGNOSIS — R93.3 ABNORMAL FINDINGS ON DIAGNOSTIC IMAGING OF OTHER PARTS OF DIGESTIVE TRACT: Chronic | ICD-10-CM

## 2022-07-13 DIAGNOSIS — Z01.818 ENCOUNTER FOR OTHER PREPROCEDURAL EXAMINATION: ICD-10-CM

## 2022-07-13 DIAGNOSIS — Z90.49 ACQUIRED ABSENCE OF OTHER SPECIFIED PARTS OF DIGESTIVE TRACT: Chronic | ICD-10-CM

## 2022-07-13 DIAGNOSIS — Z87.2 PERSONAL HISTORY OF DISEASES OF THE SKIN AND SUBCUTANEOUS TISSUE: Chronic | ICD-10-CM

## 2022-07-13 LAB
ALBUMIN SERPL ELPH-MCNC: 4.8 G/DL — SIGNIFICANT CHANGE UP (ref 3.5–5.2)
ALP SERPL-CCNC: 59 U/L — SIGNIFICANT CHANGE UP (ref 30–115)
ALT FLD-CCNC: 39 U/L — SIGNIFICANT CHANGE UP (ref 0–41)
ANION GAP SERPL CALC-SCNC: 13 MMOL/L — SIGNIFICANT CHANGE UP (ref 7–14)
APTT BLD: 29.6 SEC — SIGNIFICANT CHANGE UP (ref 27–39.2)
AST SERPL-CCNC: 31 U/L — SIGNIFICANT CHANGE UP (ref 0–41)
BASOPHILS # BLD AUTO: 0.05 K/UL — SIGNIFICANT CHANGE UP (ref 0–0.2)
BASOPHILS NFR BLD AUTO: 0.7 % — SIGNIFICANT CHANGE UP (ref 0–1)
BILIRUB SERPL-MCNC: 0.3 MG/DL — SIGNIFICANT CHANGE UP (ref 0.2–1.2)
BUN SERPL-MCNC: 22 MG/DL — HIGH (ref 10–20)
CALCIUM SERPL-MCNC: 10.1 MG/DL — SIGNIFICANT CHANGE UP (ref 8.5–10.1)
CHLORIDE SERPL-SCNC: 102 MMOL/L — SIGNIFICANT CHANGE UP (ref 98–110)
CO2 SERPL-SCNC: 24 MMOL/L — SIGNIFICANT CHANGE UP (ref 17–32)
CREAT SERPL-MCNC: 0.8 MG/DL — SIGNIFICANT CHANGE UP (ref 0.7–1.5)
EGFR: 78 ML/MIN/1.73M2 — SIGNIFICANT CHANGE UP
EOSINOPHIL # BLD AUTO: 0.16 K/UL — SIGNIFICANT CHANGE UP (ref 0–0.7)
EOSINOPHIL NFR BLD AUTO: 2.4 % — SIGNIFICANT CHANGE UP (ref 0–8)
GLUCOSE SERPL-MCNC: 91 MG/DL — SIGNIFICANT CHANGE UP (ref 70–99)
HCT VFR BLD CALC: 45.8 % — SIGNIFICANT CHANGE UP (ref 37–47)
HGB BLD-MCNC: 15 G/DL — SIGNIFICANT CHANGE UP (ref 12–16)
IMM GRANULOCYTES NFR BLD AUTO: 0.4 % — HIGH (ref 0.1–0.3)
INR BLD: 1.08 RATIO — SIGNIFICANT CHANGE UP (ref 0.65–1.3)
LYMPHOCYTES # BLD AUTO: 1.74 K/UL — SIGNIFICANT CHANGE UP (ref 1.2–3.4)
LYMPHOCYTES # BLD AUTO: 25.7 % — SIGNIFICANT CHANGE UP (ref 20.5–51.1)
MCHC RBC-ENTMCNC: 30.1 PG — SIGNIFICANT CHANGE UP (ref 27–31)
MCHC RBC-ENTMCNC: 32.8 G/DL — SIGNIFICANT CHANGE UP (ref 32–37)
MCV RBC AUTO: 91.8 FL — SIGNIFICANT CHANGE UP (ref 81–99)
MONOCYTES # BLD AUTO: 0.58 K/UL — SIGNIFICANT CHANGE UP (ref 0.1–0.6)
MONOCYTES NFR BLD AUTO: 8.6 % — SIGNIFICANT CHANGE UP (ref 1.7–9.3)
NEUTROPHILS # BLD AUTO: 4.21 K/UL — SIGNIFICANT CHANGE UP (ref 1.4–6.5)
NEUTROPHILS NFR BLD AUTO: 62.2 % — SIGNIFICANT CHANGE UP (ref 42.2–75.2)
NRBC # BLD: 0 /100 WBCS — SIGNIFICANT CHANGE UP (ref 0–0)
PLATELET # BLD AUTO: 226 K/UL — SIGNIFICANT CHANGE UP (ref 130–400)
POTASSIUM SERPL-MCNC: 4.4 MMOL/L — SIGNIFICANT CHANGE UP (ref 3.5–5)
POTASSIUM SERPL-SCNC: 4.4 MMOL/L — SIGNIFICANT CHANGE UP (ref 3.5–5)
PROT SERPL-MCNC: 7.5 G/DL — SIGNIFICANT CHANGE UP (ref 6–8)
PROTHROM AB SERPL-ACNC: 12.4 SEC — SIGNIFICANT CHANGE UP (ref 9.95–12.87)
RBC # BLD: 4.99 M/UL — SIGNIFICANT CHANGE UP (ref 4.2–5.4)
RBC # FLD: 13.1 % — SIGNIFICANT CHANGE UP (ref 11.5–14.5)
SODIUM SERPL-SCNC: 139 MMOL/L — SIGNIFICANT CHANGE UP (ref 135–146)
WBC # BLD: 6.77 K/UL — SIGNIFICANT CHANGE UP (ref 4.8–10.8)
WBC # FLD AUTO: 6.77 K/UL — SIGNIFICANT CHANGE UP (ref 4.8–10.8)

## 2022-07-13 PROCEDURE — 71046 X-RAY EXAM CHEST 2 VIEWS: CPT | Mod: 26

## 2022-07-13 PROCEDURE — 93010 ELECTROCARDIOGRAM REPORT: CPT

## 2022-07-13 NOTE — H&P PST ADULT - NSICDXPASTMEDICALHX_GEN_ALL_CORE_FT
PAST MEDICAL HISTORY:  Anxiety     Barretts esophagus     Breast cancer left arm prec    Former smoker     GERD (gastroesophageal reflux disease)     H/O therapeutic radiation     Hiatal hernia     History of cancer chemotherapy     HTN (hypertension)     Hypothyroid med dose stable ~ 1 yr    MVP (mitral valve prolapse)     Thyroid nodule

## 2022-07-13 NOTE — H&P PST ADULT - REASON FOR ADMISSION
Case Type: OP Block TimeSuite: CASProceduralist: Sara Yeon Kim  Confirmed Surgery DateTime: 07- - 0:00PAST DateTime: 07- - 10:30Procedure: RIGHT BREAST WIDE LOCAL EXCISION WITH RADIOFREQUENCY LOCALIZER  ERP?: UnavailableLaterality: RightLength of Procedure: 90 Minutes  Anesthesia Type: Local Standby

## 2022-07-13 NOTE — H&P PST ADULT - HISTORY OF PRESENT ILLNESS
71y Female presents today for presurgical testing for RIGHT BREAST WIDE LOCAL EXCISION WITH RADIOFREQUENCY LOCALIZER. Patient reports abnormal mammogram and sonogram of left breast at the end of 4/2022. Subsequent biopsy of the breast done 5/4/22 demonstrated the following:  "Pathology:    Site 1, left breast:  -Benign atrophic breast tissue with dominant paucicellular stromal   fibrosis and rare microcalcifications present in benign ductules.    This is benign, concordant histology.    Site 2, right breast:  -Intraductal papilloma.  -Benign atrophic breast tissue with proliferative type fibrocystic   changes associated with microcalcifications.    This is benign, high risk, concordant histology. Surgical management is   recommended."  Patient denies any changes to her breast appearance, temperature or texture, denies pain or nipple discharge and offers no other complaints at this time.    Patient denies any CP, palpitations, SOB, cough, or dysuria. No recent URI or UTI.  Stated exercise tolerance is FOS 1.5  RADHA screen reviewed    Patient denies any recent personal exposure to COVID19. Denies any sick contacts. Patient reports travel 5/12 - 6/12 to [place]. Patient was instructed to quarantine until after procedure.    Anesthesia Alert  NO--Difficult Airway  NO--History of neck surgery or radiation  YES--Limited ROM of neck - MILD LIMITATION EXTENTION NECK AND LATERAL ROTATION NECK RIGHT AND LEFT  NO--History of Malignant hyperthermia  NO--Personal or family history of Pseudocholinesterase deficiency.  NO--Prior Anesthesia Complication  NO--Latex Allergy  NO--Loose teeth  NO--History of Rheumatoid Arthritis  NO--RADHA  NO--Bleeding risk  YES--Other - LEFT ARM PRECAUTIONS    Patient states that this is their complete medical history and list of medications.  Patient verbalized understanding of instructions given during this visit and was given the opportunity to ask questions and have them answered. They were instructed to follow up with their surgeon/surgeon's office with any questions regarding their procedure.

## 2022-07-14 ENCOUNTER — NON-APPOINTMENT (OUTPATIENT)
Age: 72
End: 2022-07-14

## 2022-07-21 ENCOUNTER — RESULT REVIEW (OUTPATIENT)
Age: 72
End: 2022-07-21

## 2022-07-21 ENCOUNTER — OUTPATIENT (OUTPATIENT)
Dept: OUTPATIENT SERVICES | Facility: HOSPITAL | Age: 72
LOS: 1 days | Discharge: HOME | End: 2022-07-21

## 2022-07-21 DIAGNOSIS — Z90.89 ACQUIRED ABSENCE OF OTHER ORGANS: Chronic | ICD-10-CM

## 2022-07-21 DIAGNOSIS — R93.3 ABNORMAL FINDINGS ON DIAGNOSTIC IMAGING OF OTHER PARTS OF DIGESTIVE TRACT: Chronic | ICD-10-CM

## 2022-07-21 DIAGNOSIS — Z98.890 OTHER SPECIFIED POSTPROCEDURAL STATES: Chronic | ICD-10-CM

## 2022-07-21 DIAGNOSIS — R92.1 MAMMOGRAPHIC CALCIFICATION FOUND ON DIAGNOSTIC IMAGING OF BREAST: Chronic | ICD-10-CM

## 2022-07-21 DIAGNOSIS — Z90.49 ACQUIRED ABSENCE OF OTHER SPECIFIED PARTS OF DIGESTIVE TRACT: Chronic | ICD-10-CM

## 2022-07-21 DIAGNOSIS — Z87.2 PERSONAL HISTORY OF DISEASES OF THE SKIN AND SUBCUTANEOUS TISSUE: Chronic | ICD-10-CM

## 2022-07-21 PROBLEM — Z92.3 PERSONAL HISTORY OF IRRADIATION: Chronic | Status: ACTIVE | Noted: 2022-07-13

## 2022-07-21 PROBLEM — Z87.891 PERSONAL HISTORY OF NICOTINE DEPENDENCE: Chronic | Status: ACTIVE | Noted: 2022-07-13

## 2022-07-21 PROBLEM — Z92.21 PERSONAL HISTORY OF ANTINEOPLASTIC CHEMOTHERAPY: Chronic | Status: ACTIVE | Noted: 2022-07-13

## 2022-07-21 PROCEDURE — 19285 PERQ DEV BREAST 1ST US IMAG: CPT | Mod: RT

## 2022-07-21 PROCEDURE — 77065 DX MAMMO INCL CAD UNI: CPT | Mod: 26,RT

## 2022-07-22 ENCOUNTER — LABORATORY RESULT (OUTPATIENT)
Age: 72
End: 2022-07-22

## 2022-07-22 NOTE — ASU PATIENT PROFILE, ADULT - ABLE TO REACH PT
Patient:   MESFIN DUGGAN            MRN: [id number]            FIN: [id number]              Age:   23 years     Sex:  FEMALE     :  [birth year]   Associated Diagnoses:   None   Author:   SCHLATTER, BETTY CNM Postpartum  Discharge Summary    S: Feels well; denies HAs,dizziness, etc  Voiding with no problems  Infant feeding method: breast   BCM: IUD  Plans discharge today; will have help at home    O: vs B/P 117/74; pulse 84; temp 36.5 C;  H&H 10.9/33.7; WBC 9.1; platelets 273 K  Breasts: soft; nipples erect, intact  Abdomen: soft; FF ML NT U/U  Perineum: Well approx, not edematous  Lochia:  small serosa; no unusual odor  Legs: neg mike's; neg edema; DTRs 1+/1+    A: S/P  with first degree perineal laceration with normal PP course thus far    P: Home today in satisfactory condition  PP discharge instructions reviewed including high iron/high fiber nutrition, water intake, gradual increase of exercise, breast and perineal care, no sex until after PP appt and decision about method of birth control, use of pain meds  Encouraged to buy OTC Tylenol 500 mg q4-6h and/or ibuprofen 200 mg (up to 3 tabs) q4-6h (alternate meds if using both)  Continue prenatal/multivitamin daily  Pt. to go to [facility] for 4-6 week PP appt. with CNM; RN will give appt card in her chart  Page or call CNM with questions or problems  To make peds appointment within 1-2 days of discharge    Betty Schlatter, APRN CNMARCK  Page via [employer]  
yes

## 2022-07-22 NOTE — ASU PATIENT PROFILE, ADULT - NSICDXPASTSURGICALHX_GEN_ALL_CORE_FT
PAST SURGICAL HISTORY:  Abnormal magnetic resonance cholangiopancreatography (MRCP) STENT PLACED IN BILE DUCT    Breast calcifications     H/O lymph node biopsy sentinal node left breast    H/O sebaceous cyst EXCISION LEFT BREAST    History of lumpectomy of right breast 2019    S/P laparoscopic cholecystectomy 5/18/19 WITH REMOVAL OF CBD STENT    S/P lumpectomy, left breast     S/P tonsillectomy

## 2022-07-25 ENCOUNTER — OUTPATIENT (OUTPATIENT)
Dept: OUTPATIENT SERVICES | Facility: HOSPITAL | Age: 72
LOS: 1 days | Discharge: HOME | End: 2022-07-25

## 2022-07-25 ENCOUNTER — APPOINTMENT (OUTPATIENT)
Dept: BREAST CENTER | Facility: AMBULATORY SURGERY CENTER | Age: 72
End: 2022-07-25

## 2022-07-25 ENCOUNTER — RESULT REVIEW (OUTPATIENT)
Age: 72
End: 2022-07-25

## 2022-07-25 ENCOUNTER — TRANSCRIPTION ENCOUNTER (OUTPATIENT)
Age: 72
End: 2022-07-25

## 2022-07-25 VITALS
HEART RATE: 75 BPM | DIASTOLIC BLOOD PRESSURE: 70 MMHG | RESPIRATION RATE: 20 BRPM | OXYGEN SATURATION: 97 % | SYSTOLIC BLOOD PRESSURE: 150 MMHG

## 2022-07-25 VITALS
SYSTOLIC BLOOD PRESSURE: 123 MMHG | WEIGHT: 166.89 LBS | RESPIRATION RATE: 18 BRPM | HEART RATE: 70 BPM | HEIGHT: 67 IN | OXYGEN SATURATION: 96 % | DIASTOLIC BLOOD PRESSURE: 73 MMHG | TEMPERATURE: 98 F

## 2022-07-25 DIAGNOSIS — Z98.890 OTHER SPECIFIED POSTPROCEDURAL STATES: Chronic | ICD-10-CM

## 2022-07-25 DIAGNOSIS — Z87.2 PERSONAL HISTORY OF DISEASES OF THE SKIN AND SUBCUTANEOUS TISSUE: Chronic | ICD-10-CM

## 2022-07-25 DIAGNOSIS — Z90.89 ACQUIRED ABSENCE OF OTHER ORGANS: Chronic | ICD-10-CM

## 2022-07-25 DIAGNOSIS — R93.3 ABNORMAL FINDINGS ON DIAGNOSTIC IMAGING OF OTHER PARTS OF DIGESTIVE TRACT: Chronic | ICD-10-CM

## 2022-07-25 DIAGNOSIS — Z90.49 ACQUIRED ABSENCE OF OTHER SPECIFIED PARTS OF DIGESTIVE TRACT: Chronic | ICD-10-CM

## 2022-07-25 DIAGNOSIS — R92.1 MAMMOGRAPHIC CALCIFICATION FOUND ON DIAGNOSTIC IMAGING OF BREAST: Chronic | ICD-10-CM

## 2022-07-25 PROCEDURE — 88305 TISSUE EXAM BY PATHOLOGIST: CPT | Mod: 26

## 2022-07-25 PROCEDURE — 19125 EXCISION BREAST LESION: CPT | Mod: RT

## 2022-07-25 RX ORDER — HYDROMORPHONE HYDROCHLORIDE 2 MG/ML
0.5 INJECTION INTRAMUSCULAR; INTRAVENOUS; SUBCUTANEOUS ONCE
Refills: 0 | Status: DISCONTINUED | OUTPATIENT
Start: 2022-07-25 | End: 2022-07-25

## 2022-07-25 RX ORDER — MULTIVIT-MIN/FERROUS GLUCONATE 9 MG/15 ML
1 LIQUID (ML) ORAL
Qty: 0 | Refills: 0 | DISCHARGE

## 2022-07-25 RX ORDER — OMEPRAZOLE 10 MG/1
0 CAPSULE, DELAYED RELEASE ORAL
Qty: 0 | Refills: 0 | DISCHARGE

## 2022-07-25 RX ORDER — SODIUM CHLORIDE 9 MG/ML
1000 INJECTION, SOLUTION INTRAVENOUS
Refills: 0 | Status: DISCONTINUED | OUTPATIENT
Start: 2022-07-25 | End: 2022-08-08

## 2022-07-25 RX ORDER — AMLODIPINE BESYLATE 2.5 MG/1
1 TABLET ORAL
Qty: 0 | Refills: 0 | DISCHARGE

## 2022-07-25 RX ORDER — IBUPROFEN 200 MG
1 TABLET ORAL
Qty: 15 | Refills: 0
Start: 2022-07-25 | End: 2022-07-29

## 2022-07-25 RX ORDER — LOSARTAN POTASSIUM 100 MG/1
1 TABLET, FILM COATED ORAL
Qty: 0 | Refills: 0 | DISCHARGE

## 2022-07-25 RX ORDER — VENLAFAXINE HCL 75 MG
1 CAPSULE, EXT RELEASE 24 HR ORAL
Qty: 0 | Refills: 0 | DISCHARGE

## 2022-07-25 RX ORDER — ACETAMINOPHEN 500 MG
650 TABLET ORAL ONCE
Refills: 0 | Status: DISCONTINUED | OUTPATIENT
Start: 2022-07-25 | End: 2022-08-08

## 2022-07-25 RX ORDER — LEVOTHYROXINE SODIUM 125 MCG
1 TABLET ORAL
Qty: 0 | Refills: 0 | DISCHARGE

## 2022-07-25 RX ORDER — METOPROLOL TARTRATE 50 MG
1 TABLET ORAL
Qty: 0 | Refills: 0 | DISCHARGE

## 2022-07-25 RX ADMIN — SODIUM CHLORIDE 100 MILLILITER(S): 9 INJECTION, SOLUTION INTRAVENOUS at 08:57

## 2022-07-25 NOTE — ASU DISCHARGE PLAN (ADULT/PEDIATRIC) - FOLLOW UP APPOINTMENTS
[facility],  Endoscopy/Ambulatory Surgery [facility]:  Center for Amb[facility]

## 2022-07-25 NOTE — ASU DISCHARGE PLAN (ADULT/PEDIATRIC) - NS MD DC FALL RISK RISK
For information on Fall & Injury Prevention, visit: [email or web address] OR  [email or web address] OR  https://www.cdc.gov/steadi/patient.html

## 2022-07-25 NOTE — CHART NOTE - NSCHARTNOTEFT_GEN_A_CORE
PACU ANESTHESIA ADMISSION NOTE      Procedure: Right breast lumpectomy      Post op diagnosis:  Intraductal papilloma of right breast        ____  Intubated  TV:______       Rate: ______      FiO2: ______    __x__  Patent Airway    __x__  Full return of protective reflexes    __x__  Full recovery from anesthesia / back to baseline status    Vitals:  T(C): 36.7 (07-25-22 @ 07:07), Max: 36.7 (07-25-22 @ 06:31)  HR: 70 (07-25-22 @ 07:07) (70 - 70)  BP: 123/73 (07-25-22 @ 07:07) (123/73 - 123/73)  RR: 18 (07-25-22 @ 07:07) (18 - 18)  SpO2: 96% (07-25-22 @ 07:07) (96% - 96%)    Mental Status:  __x__ Awake   ___x__ Alert   _____ Drowsy   _____ Sedated    Nausea/Vomiting:  __x__ NO  ______Yes,   See Post - Op Orders          Pain Scale (0-10):  __0___    Treatment: ____ None    __x__ See Post - Op/PCA Orders    Post - Operative Fluids:   ____ Oral   __x__ See Post - Op Orders    Plan: Discharge:   __x__Home       _____Floor     _____Critical Care    _____  Other:_________________    Comments: Patient had smooth intraoperative event, no anesthesia complication.  PACU Vital signs: HR:   70         BP:    122    /   56       RR:   12          O2 Sat:   99    %     Temp 98F

## 2022-07-25 NOTE — ASU DISCHARGE PLAN (ADULT/PEDIATRIC) - ASU DC SPECIAL INSTRUCTIONSFT
Breast Wide local excision    What to expect  1. You will have dermabond covering your incision. This is skin glue and creates an artificial scar over the wound.    2. A small amount of bruising is normal after surgery    Wound Care  1. You may shower after 24-48 hrs after surgery.    2. When showering, do not try to scrub the incision. Do not soak the incision (baths/swimming/hot tubs) for 2 weeks.    3. The dermabond will start to come off after about 2 weeks. Do not try to pick off the edges that have loosened.    4. You should wear a sports bra or a more supportive bra day and night for 1-2 weeks post operatively. This will help with your pain after surgery.    Restrictions  1. It is always beneficial to ambulate regularly after surgery    2. You may resume normal activities the day after surgery (as tolerated). (It is okay to lift heavy things)    3. Avoid bras/clothing that directly put pressure on the incision    4. Most home medications can be restarted the day after surgery (24 hours post-op). Specific instructions for blood thinners/aspirin will be given to patient per the medication reconciliation form    5. Do NOT make important decisions or drive while on opioid pain medications    Pain control  1. You will be given a script for ibuprofen 600 mg, 1 tablet every 8 hrs as needed for pain. If you still require more medications, you can alternate between Tylenol and Ibuprofen    2. Please take the pain medication with food to decrease GI upset    3. If requiring stronger medications, please call the office at [phone] to speak with a practitioner as this may be a sign of prolonged healing/seroma/hematoma/infection/necrosis    Diet  No Restrictions    When to call us  1. if you develop fevers or chills    2. if the incisions become red, tender and warm to touch    3. if the drainage from the bulb increases in volume, turn dark red with clots, stops draining completely    4. if you notice purulent drainage from either the incision or the drain    5. if you notice that your chest wall is becoming "puffy" or filled with fluid after surgery    Follow up care  1. follow up appointment is usually 1-2 weeks after surgery    2. You will receive final pathology and further instructions during the first follow up visit.

## 2022-07-25 NOTE — ASU DISCHARGE PLAN (ADULT/PEDIATRIC) - CARE PROVIDER_API CALL
Naomi Morataya)  Surgery  [address] [facility] [place] [address]  Phone: [phone]  Fax: [phone]  Established Patient  Follow Up Time:

## 2022-07-27 ENCOUNTER — APPOINTMENT (OUTPATIENT)
Dept: HEMATOLOGY ONCOLOGY | Facility: CLINIC | Age: 72
End: 2022-07-27

## 2022-07-27 DIAGNOSIS — D24.1 BENIGN NEOPLASM OF RIGHT BREAST: ICD-10-CM

## 2022-07-29 LAB — SURGICAL PATHOLOGY STUDY: SIGNIFICANT CHANGE UP

## 2022-08-01 ENCOUNTER — OUTPATIENT (OUTPATIENT)
Dept: OUTPATIENT SERVICES | Facility: HOSPITAL | Age: 72
LOS: 1 days | Discharge: HOME | End: 2022-08-01

## 2022-08-01 DIAGNOSIS — F41.9 ANXIETY DISORDER, UNSPECIFIED: ICD-10-CM

## 2022-08-01 DIAGNOSIS — Z92.21 PERSONAL HISTORY OF ANTINEOPLASTIC CHEMOTHERAPY: ICD-10-CM

## 2022-08-01 DIAGNOSIS — I10 ESSENTIAL (PRIMARY) HYPERTENSION: ICD-10-CM

## 2022-08-01 DIAGNOSIS — Z85.3 PERSONAL HISTORY OF MALIGNANT NEOPLASM OF BREAST: ICD-10-CM

## 2022-08-01 DIAGNOSIS — Z92.3 PERSONAL HISTORY OF IRRADIATION: ICD-10-CM

## 2022-08-01 DIAGNOSIS — Z90.49 ACQUIRED ABSENCE OF OTHER SPECIFIED PARTS OF DIGESTIVE TRACT: Chronic | ICD-10-CM

## 2022-08-01 DIAGNOSIS — D24.1 BENIGN NEOPLASM OF RIGHT BREAST: ICD-10-CM

## 2022-08-01 DIAGNOSIS — R92.1 MAMMOGRAPHIC CALCIFICATION FOUND ON DIAGNOSTIC IMAGING OF BREAST: Chronic | ICD-10-CM

## 2022-08-01 DIAGNOSIS — Z80.3 FAMILY HISTORY OF MALIGNANT NEOPLASM OF BREAST: ICD-10-CM

## 2022-08-01 DIAGNOSIS — N60.01 SOLITARY CYST OF RIGHT BREAST: ICD-10-CM

## 2022-08-01 DIAGNOSIS — Z87.891 PERSONAL HISTORY OF NICOTINE DEPENDENCE: ICD-10-CM

## 2022-08-01 DIAGNOSIS — Z98.890 OTHER SPECIFIED POSTPROCEDURAL STATES: Chronic | ICD-10-CM

## 2022-08-01 DIAGNOSIS — Z90.49 ACQUIRED ABSENCE OF OTHER SPECIFIED PARTS OF DIGESTIVE TRACT: ICD-10-CM

## 2022-08-01 DIAGNOSIS — M25.551 PAIN IN RIGHT HIP: ICD-10-CM

## 2022-08-01 DIAGNOSIS — Z90.89 ACQUIRED ABSENCE OF OTHER ORGANS: Chronic | ICD-10-CM

## 2022-08-01 DIAGNOSIS — E03.9 HYPOTHYROIDISM, UNSPECIFIED: ICD-10-CM

## 2022-08-01 DIAGNOSIS — K21.9 GASTRO-ESOPHAGEAL REFLUX DISEASE WITHOUT ESOPHAGITIS: ICD-10-CM

## 2022-08-01 DIAGNOSIS — N64.89 OTHER SPECIFIED DISORDERS OF BREAST: ICD-10-CM

## 2022-08-01 DIAGNOSIS — N60.81 OTHER BENIGN MAMMARY DYSPLASIAS OF RIGHT BREAST: ICD-10-CM

## 2022-08-01 DIAGNOSIS — N60.21 FIBROADENOSIS OF RIGHT BREAST: ICD-10-CM

## 2022-08-01 DIAGNOSIS — N60.31 FIBROSCLEROSIS OF RIGHT BREAST: ICD-10-CM

## 2022-08-01 DIAGNOSIS — Z87.2 PERSONAL HISTORY OF DISEASES OF THE SKIN AND SUBCUTANEOUS TISSUE: Chronic | ICD-10-CM

## 2022-08-01 DIAGNOSIS — I34.1 NONRHEUMATIC MITRAL (VALVE) PROLAPSE: ICD-10-CM

## 2022-08-01 DIAGNOSIS — R93.3 ABNORMAL FINDINGS ON DIAGNOSTIC IMAGING OF OTHER PARTS OF DIGESTIVE TRACT: Chronic | ICD-10-CM

## 2022-08-01 PROCEDURE — 73502 X-RAY EXAM HIP UNI 2-3 VIEWS: CPT | Mod: 26,RT

## 2022-08-05 ENCOUNTER — APPOINTMENT (OUTPATIENT)
Dept: BREAST CENTER | Facility: CLINIC | Age: 72
End: 2022-08-05

## 2022-08-05 VITALS
SYSTOLIC BLOOD PRESSURE: 124 MMHG | WEIGHT: 164 LBS | BODY MASS INDEX: 25.74 KG/M2 | HEART RATE: 92 BPM | HEIGHT: 67 IN | DIASTOLIC BLOOD PRESSURE: 79 MMHG

## 2022-08-05 PROCEDURE — 99024 POSTOP FOLLOW-UP VISIT: CPT

## 2022-08-05 NOTE — HISTORY OF PRESENT ILLNESS
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  Mother had pancreatic cancer.  Brother has prostate cancer.  No BRCA testing in family.  \par \par Status post insertion of a mediport on 3/5/18.   Status post re-placement of mediport 3/29/18 for neoadjuvant chemotherapy.  Completed Herceptin and Perjeta.  \par \par Status post Left NLOC of three areas and SLNB 8/24/18 demonstrating 0/2 (-); 3. Breast, left 12 o'clock mass, needle localized lumpectomy: Sclerosing large duct papilloma associated with florid duct hyperplasia and coarse stromal calcifications, radial sclerosing lesion (radial scar).  Diffuse histologic changes consistent with prior systemic therapy effect.  Focal healing prior biopsy site change with no residual carcinoma identified.  AJCC 8th Edition Pathologic Stage: ypT0, p(sn)N0, pMx, pCR (pathologic complete response to neoadjuvant therapy).\par Breast, left 11 o'clock mass, needle localized lumpectomy: Radial sclerosing lesion (radial scar) located adjacent to healing prior biopsy site changes.\par Breast, left 9 o'clock mass, needle localized lumpectomy: Sclerosing small duct papilloma containing florid duct hyperplasia with adjacent healing prior biopsy site changes.\par \par s/p adjuvant RT w/ Dr. Kennedy Muñoz.\par \par Left breast fragments of hyalinized fibroadenoma containing coarse stromal calcifications with adjacent benign atrophic fatty breast tissue on stereotactic core biopsy 10/28/19; Posterior (tophat).  Findings are benign concordant and 6-month followup left breast mammogram recommended.\par \par Right breast radial sclerosing lesion on ultrasound guided core biopsy 10/28/19; 2:00 N2, 8 mm (top hat).  Surgical excision recommended.  \par \par Status post Right NLOC and removal of mediport 12/13/19 demonstrating large duct sclerosing papilloma containing focal florid duct hyperplasia and focal atypical lobular hyperplasia.  \par \par MONICA ALMONTE is a 71 year old female patient who presents today in follow up for Stage IIA left breast cancer.\par Since her last visit, she has no new breast related complaints. \par \par Her most recent imaging:\par B/L Screening Mammo - 04/27/2022:\par -There are scattered areas of fibroglandular density.\par -There is an area of architectural distortion at the site of lumpectomy seen in the left breast.\par -There is an area of benign architectural distortion corresponding to the site of surgery seen in the right breast.\par -There is no mammographic evidence of malignancy.\par BI-RADS Category 2:  Benign\par \par B/L Breast Sono - 04/27/2022:\par Right breast:\par -At the 2:00 position 7 cm from the nipple, there is a stable hypoechoic mass measuring 1.9 x 1.3 x 0.4 cm, benign.\par -At the 4:00 position 2 cm from the nipple, there is a previously biopsied benign mass measuring 1.1 x 0.3 x 0.8 cm.\par -At the 11:00 position periareolar region, there is a heterogeneous mass measuring 3.0 x 1.0 x 1.9 cm, indeterminate. \par -No axillary adenopathy.\par Left breast:\par -At the 6:00 position 2 cm from the nipple (periareolar region), there is a heterogeneous mass measuring 3.5 x 0.5 x 1.8 cm. Real-time ultrasound is recommended.\par -Benign scar is noted at the lumpectomy site at the 12:00 position.\par -No axillary adenopathy.\par BI-RADS Category 0:  Incomplete: Needs Additional Imaging Evaluation\par \par B/L Breast Sono - 05/02/2022:\par -Right breast at the 11:00 location periareolar region, there appears to be a single duct with debris measuring 3.0 cm x 1.0 cm x 1.9 cm\par -6:00 location 2 cm from the nipple of the left breast measuring 3.5 cm x 0.5 cm x 1.7 cm. \par An ultrasound-guided core biopsy of each area is recommended.\par BI-RADS Category 4: Suspicious\par \par The recommended biopsies have not yet been performed.\par \par INTERVAL HISTORY 6/21/22\par Monica is here to discuss her new dx of RIGHT breast Intraductal papilloma.\par She has no breast related complaints at this time.  She denies any breast pain, has not palpated any new palpable masses in either breast and denies any nipple discharge or retraction.\par \par Her imaging is as follows:\par 05/04/2022\par Site 1, left breast US guided bx:(mini-cork) \par -Benign atrophic breast tissue with dominant paucicellular stromal fibrosis and rare microcalcifications present in benign ductules.\par Site 2, right breast US guided bx:(stop-light) \par -Intraductal papilloma.\par -Benign atrophic breast tissue with proliferative type fibrocystic changes associated with microcalcifications.\par \par 08/05/2022--\par MONICA ALMONTE is a 71 year old female patient who presents today Pt presents to the office for her initial post-operative visit.\par She is s/p Right WLE w/ RF loc for an intraductal papilloma - 07/25/2022.\par She is feeling well.\par She denies any fever / chills or erythema and / or drainage related to the incision.\par Her pain is well controlled, only complains of mild soreness of the area.\par \par Her final surgical pathology revealed intraductal papilloma located adjacent to healing prior biopsy site changes and separate radial sclerosing lesion (radial scar).\par

## 2022-08-05 NOTE — PHYSICAL EXAM
[Normocephalic] : normocephalic [Atraumatic] : atraumatic [EOMI] : extra ocular movement intact [Examined in the supine and seated position] : examined in the supine and seated position [Symmetrical] : symmetrical [No dominant masses] : no dominant masses in right breast  [No dominant masses] : no dominant masses left breast [No Nipple Retraction] : no left nipple retraction [No Nipple Discharge] : no left nipple discharge [No Axillary Lymphadenopathy] : no left axillary lymphadenopathy [No Edema] : no edema [No Rashes] : no rashes [No Ulceration] : no ulceration [de-identified] : On physical exam, there are no discrete masses in either breast or axilla. There is no nipple discharge or inversion bilaterally. There are no skin changes bilaterally.\par \par  [de-identified] : her surgical incision is healing well with dermabond still in place; no signs of infection, erythema or induration

## 2022-08-05 NOTE — ASSESSMENT
[FreeTextEntry1] : ALEK is a akbar 71 year old patient who presented today in follow up for a history of Stage IIA left breast cancer.  She present with new dx of right breast intraductal papilloma \par She presents today Pt presents to the office for her initial post-operative visit.\par She is s/p Right WLE w/ RF loc - 07/25/2022.\par She is feeling well.\par She denies any fever / chills or erythema and / or drainage related to the incision.\par Her pain is well controlled, only complains of mild soreness of the area.\par \par Her final surgical pathology revealed intraductal papilloma located adjacent to healing prior biopsy site changes and separate radial sclerosing lesion (radial scar).\par \par Physical exam, her right breast surgical incision is healing well without any signs of infection, erythema or induration. \par \par She will be due for a b/l dx mammogram and US on 11/2022 (this will be delayed until 12/2022 to allow for extra healing).  This will be scheduled for her today.  I will have her follow up after for a CBE. \par \par AS REVIEW: \par Her imaging is as follows:\par 05/04/2022\par Site 1, left breast US guided bx:(mini-cork) \par -Benign atrophic breast tissue with dominant paucicellular stromal fibrosis and rare microcalcifications present in benign ductules.\par Site 2, right breast US guided bx:(stop-light) \par -Intraductal papilloma.\par -Benign atrophic breast tissue with proliferative type fibrocystic changes associated with microcalcifications.\par \par \par Intraductal papillomas without atypia are considered fibroproliferative lesions without atypia.  Patients with these lesions were found to have a slightly increased relative risk of breast cancer compared to the reference population.  However the lesions themselves do not have any malignant potential. \par \par Although newer studies regarding the upgrade rate (to cancer) ranges from 0-14% on surgical excision, with pathologic/radiologic concordance, older studies found a higher upgrade rate.  I have recommended surgical excision for this reason.  Because it is not readily palpable, I will have her undergo a preoperative radiofrequency tag localization.  \par \par The risks and benefits of the procedure were explained to the patient, including bleeding, infection, seroma/hematoma formation, and possible re-operation if the surgical excision yields an upgrade to cancer with positive margins. Informed consent was obtained today.\par \par PLAN:\par -DX: RIGHT BREAST INTRADUCTAL PAPILLOMA\par -b/l Uni Dx Mammo & Sono - 12/2022 (R breast surgery, L benign biopsy, 6 month follow up.)\par -Follow-up after.

## 2022-08-15 ENCOUNTER — APPOINTMENT (OUTPATIENT)
Dept: HEMATOLOGY ONCOLOGY | Facility: CLINIC | Age: 72
End: 2022-08-15

## 2022-08-15 ENCOUNTER — LABORATORY RESULT (OUTPATIENT)
Age: 72
End: 2022-08-15

## 2022-08-15 ENCOUNTER — OUTPATIENT (OUTPATIENT)
Dept: OUTPATIENT SERVICES | Facility: HOSPITAL | Age: 72
LOS: 1 days | Discharge: HOME | End: 2022-08-15

## 2022-08-15 VITALS
HEART RATE: 87 BPM | SYSTOLIC BLOOD PRESSURE: 121 MMHG | WEIGHT: 168 LBS | HEIGHT: 67 IN | BODY MASS INDEX: 26.37 KG/M2 | RESPIRATION RATE: 18 BRPM | DIASTOLIC BLOOD PRESSURE: 78 MMHG

## 2022-08-15 DIAGNOSIS — Z90.49 ACQUIRED ABSENCE OF OTHER SPECIFIED PARTS OF DIGESTIVE TRACT: Chronic | ICD-10-CM

## 2022-08-15 DIAGNOSIS — M25.551 PAIN IN RIGHT HIP: ICD-10-CM

## 2022-08-15 DIAGNOSIS — Z98.890 OTHER SPECIFIED POSTPROCEDURAL STATES: Chronic | ICD-10-CM

## 2022-08-15 DIAGNOSIS — R93.3 ABNORMAL FINDINGS ON DIAGNOSTIC IMAGING OF OTHER PARTS OF DIGESTIVE TRACT: Chronic | ICD-10-CM

## 2022-08-15 DIAGNOSIS — Z87.2 PERSONAL HISTORY OF DISEASES OF THE SKIN AND SUBCUTANEOUS TISSUE: Chronic | ICD-10-CM

## 2022-08-15 DIAGNOSIS — R92.1 MAMMOGRAPHIC CALCIFICATION FOUND ON DIAGNOSTIC IMAGING OF BREAST: Chronic | ICD-10-CM

## 2022-08-15 DIAGNOSIS — Z90.89 ACQUIRED ABSENCE OF OTHER ORGANS: Chronic | ICD-10-CM

## 2022-08-15 PROCEDURE — 99214 OFFICE O/P EST MOD 30 MIN: CPT

## 2022-08-15 NOTE — ASSESSMENT
[FreeTextEntry1] : 1. Poorly differentiated IDC of the left breast, ER/OH negative and Her-2 positive, with large tumor mass and FDG avid left axillary adenopathy, clinical stage T3N1, s/p neoadjuvant chemotherapy followed by left lumpectomy and SLNB, achieved pCR, s/p adjuvant WBI.\par 2. Right breast ALH, sclerosing papilloma and proliferative type fibrocystic changes, s/p lumpectomy.\par  \par Assessment and Plan:\par -- Breast imaging and biopsies reviewed. On 4/27/22, B/L Breast Mammogram showed an area of architectural distortion at the site of lumpectomy seen in the left breast and an area of benign architectural distortion corresponding to the site of surgery seen in the right breast. b/l breast US on 5/2/22 showed -Right breast at the 11:00 location periareolar region, there appears to be a single duct with debris measuring 3.0 cm x 1.0 cm x 1.9 cm and 6:00 location 2 cm from the nipple of the left breast measuring 3.5 cm x 0.5 cm x 1.7 cm. \par An ultrasound-guided core biopsy of each area is recommended. On 5/4/22, she had US guided biopsies. The left breast mass biopsy showed benign atrophic breast tissue. Left breast mass biopsy showed Intraductal papilloma and benign atrophic breast tissue with proliferative type fibrocystic. On 7/25/22, she had right retroareolar mass, radiofrequency seed localized lumpectomy which revealed Intraductal papilloma containing florid duct hyperplasia located and separate radial sclerosing lesion (radial scar). \par -- Breast exam today did not reveal palpable abnormality. She is scheduled for b/l dx mammo and US in 12/2022.\par -- Order blood work for CBC, CMP and tumor markers.\par -- Right hip pain. X-Ray showed arthrosis and generative changes. Will refer her for MRI right hip for further evaluation. \par -- RTO for followup visit in 6 months.\par \par \par

## 2022-08-15 NOTE — PHYSICAL EXAM
[Restricted in physically strenuous activity but ambulatory and able to carry out work of a light or sedentary nature] : Status 1- Restricted in physically strenuous activity but ambulatory and able to carry out work of a light or sedentary nature, e.g., light house work, office work [Normal] : affect appropriate [de-identified] : Left breast lumpectomy surgical incision healed well. No tenderness. left SLNB scar healed well.

## 2022-08-15 NOTE — HISTORY OF PRESENT ILLNESS
[Patient] : the patient [de-identified] : 68 yo female is here for f/u visit and chemotherapy. She was recently diagnosed left breast cancer. She had screening mammogram in 06/2015 and it was normal. In Jan 2018 she noticed a lump in left breast. A diagnostic mammo on 1/18/2018 showed a  2.1  cm  mass In  the  region  of  patient's  palpable  abnormality.  No  other  suspicious  masses,  areas of  architectural  distortion  or  cluster  of  microcalcifications  in  either  breast.  Evaluation  of  the  left  axilla  demonstrates  normal-appearing  lymph  nodes.\par   \par Targeted  Left  Breast  Sonogram showed  \par In  the  region  of  the  patient's  palpable  abnormality  at  the  12:00  location  3  cm  from  the  nipple,  there  is  an  irregular  hypoechoic  mass  measuring  5.2  cm  x  3.2  cm  x  1.2  cm  which  corresponds  to  mammographic  findings. \par Other  masses  also  identified  within  the  left  breast  and  are  as  follows: 6:00  location  2  cm  from  the  nipple  measuring  4.1  cm  x  2.4  cm  x  1.4  cm,  9-10  o'clock  location  4  cm  from  the  nipple  measuring  2.1  cm  1.4  cm  x  0.4  cm  and  11:00  location  5  to  6  cm  from  the  nipple  measuring  1.3  cm  x  2.1  cm  x  0.4  cm.   An  ultrasound-guided  core  biopsy  of  the  each  mass  is  recommended\par \par She then had USG guided biopsies of those lesions. Pathology showed \par Site  A:  Left  breast,  11:00  N5-6. \par Intraductal  papilloma  containing  florid  duct  hyperplasia. \par   \par Site  B:  Left  breast  9-10  o'clock  N4. \par -Complex  sclerosing  lesion  (radial  scar)  containing  focal  florid  duct  hyperplasia.  \par -Atrophic  breast  tissue  associated  with  coarse  stromal  calcifications. \par  Site  C:  Left  Breast  6:00  N2 \par -Benign  breast  tissue  with  proliferative  type  fibrocystic  changes  including  dominant  dense  stromal  fibrosis,  focal  florid  duct  hyperplasia,  sclerosing  adenosis,  macrocyst  wall  fragments,  columnar  cell  change/hyperplasia  without  atypia,  and  rare  microcalcifications  present  in  benign  ducts. 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  Surgical  and  oncologic  follow-up  for  the  biopsy-proven  12:00  carcinoma  is  recommended.    Surgical  planning  for  excision  of  the  11:00  and  9-10  o'clock  high  risk  lesions  also  recommended\par \par On 2/13/2018, PET/CT  showed abnormal increased uptake is seen in multiple areas in the left breast. Max SUV 13.92 in the superior breast mass near surgical clip findings are \par consistent with biopsy proven malignancy in the superior mass. Remaining of the abnormal areas of increased uptake could be malignancy and/or postinflammatory nature. Correlate the findings with the biopsy reports.  There are PET positive left axillary lymph nodes with a max SUV 2.56. Findings could be postinflammatory and/or malignancy. \par No other areas of abnormal increased uptake is seen to suggest biologic tumor activity. \par \par On 2/15/18,  MRI breast was done. the report is still pending.\par \par The patient saw Dr. Hampton for surgical consultation. In light of large Her-2 positive tumor, she was given an option to have neoadjuvant chemotherapy followed by surgery. Monica is here with her sister for consultation of neoadjuvant systemic therapy. [de-identified] : Monica has completed neoadjuvant chemotherapy with TCHP x 6 times.  She responded well and her left breast mass has reduced significantly. She complains fatigue, \par She experienced side effects from the chemotherapy including diarrhea, poor appetite and insomnia. These symptoms have got better since chemo completed.\par \par On 7/27/18, she had PET/CT which showed No pathologic FDG uptake to suggest definite biologic tumor activity. All previously seen pathologic FDG uptake in the left breast has resolved. Previously seen mild FDG uptake in left axillary nodes has resolved. Findings are consistent with good treatment response. Mildly FDG avid portacaval lymph node, SUV max 5.7, is nonspecific but less likely to represent biologic tumor activity. This can be reassessed on follow-up. \par \par On 7/23/18, she had b/l breast MRI which showed Interval decrease in size of left breast 12:00 axis biopsy-proven invasive ductal carcinoma; currently it measures up to 1.0 cm, previously measuring up to 3.2 cm. \par \par 9/7/18:\par The patient is here today for followup and treatment. She underwent NLOC left lumpectomy and SLNB on 8/24/18. The pathology showed no evidence of residual disease and 2 SLN negative. She recovered well from surgery.\par She still has loose bowel movement sometimes. her appetite is improving.\par \par 10/19/2018\par Patient is here for a follow up prior to getting treatment with Herceptin and Perjeta. Her lumpectomy scars have healed up well and she has some mild tenderness. It assured that it will get better. She was concerned about irregularity at incision site and again we discussed the mammography findings which showed post lumpectomy changes ( 10/2018) and that it is scar tissue. \par She had mapping done for radiation, yet to start though. \par Her loose bowel movements have improved as well. \par She offers no other complaints.\par \par 12/28/18:\par The patient is here for followup visit. She completed radiation in 11/2018. She feels well and does not have new complains. In 10/2018, she had b/l dx mammo. There was no suspicious finding. In 11/2018, she had 2D echo. Her LVEF was 50-55%.\par \par 2/8/19: Pt is here for a follow-up visit. She is doing well on herceptin and perjeta. Last echo from Nov 2018 showed EF of 55-65%. No fresh complaints. Last US from Jan 2019 showed:\par Oval, hypoechoic, circumscribed, parallel mass in the right breast, 4:00  axis, 2 to 3 cm from the nipple measures 0.9 x 0.4 x 1.1 cm. This is not  significantly changed in size from 2/26/2018 where it measured 1.0 x 0.4  x 1.1 cm, and was previously biopsy proven to be benign.\par   Additional oval, circumscribed, hypoechoic mass in the right breast, 2:00  axis, 7 cm from the nipple measures 1.7 x 1.2 x 0.5 cm. This was likely  present on the prior MRI of 2/15/2018 (series 8, image 49) and is also  likely benign. Continued sonographic follow-up is recommended.\par   Benign post lumpectomy changes are seen in the left breast, 12:00 axis.  No suspicious solid or cystic masses are seen in the left breast.\par \par 4/12/19:\par The patient is here for f/u visit. She complains RUQ pain for a few weeks. She saw Dr. Manzo and had abdominal sonogram which showed Cholelithiasis without sonographic evidence of acute cholecystitis. CBD dilatation (11 mm), new since March 2016. Recommend correlation with laboratory tests. If indicated, MRCP can be obtained to exclude CBD \par obstruction or stone. She has been taking Cipro and Flagyl. She did not have any fever. She is given a referral for MRCP but she has not made an appointment yet.\par She has b/l dx mammo on 4/219. There was no suspicious finding.\par \par 7/26/2019: \par The patient is here for follow up. Since last visit , she had a cholecystectomy which showed chronic cholecystitis and cholelithiasis. Her bilirubin and liver enzymes normalized after surgery. She is also switched to venlafaxine for her anxiety. She is feeling much better. She is trying to exercise and regain energy. She complains left axillary pain at the site of SNLBx . She is also concerned about multiple lesions of her breasts that were proven to be benign in the past. She is due for a mammogram in October 2019.\par The patient also reported palpitations when she gets up quickly from a seating or a sleeping position , and bilateral ankle swelling by the end of the day . She will see her PMD to adjust her medications. \par \par 11/11/19\par Patient is here today for follow up visit.  She had a mammogram 10/2019 which showed calcification in left breast and a radial sclerosing lesion on biopsy of right breast.  She will have a lumpectomy on right breast for this high risk benign lesion and port removal on same day-12/11/19 by Dr. Hampton.  She offers no further complaints.\par \par 5/4/2020:\par Patient is evaluated via telehealth. She has IDC of the left breast, ER/NV negative and Her-2 positive, with large tumor mass and FDG avid left axillary adenopathy, clinical stage T3N1, s/p neoadjuvant chemotherapy followed by left lumpectomy and SLNBin 8/2018, achieved pCR - followed by  maintenance herceptin and Perjeta. She received adjuvant breast radiotherapy. She had a mammogram 10/2019 which showed calcification in left breast and a radial sclerosing lesion on biopsy of right breast. 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 She recovered well from surgery. She does not have any new complains.\par \par 8/3/2020\par 68 yo female is here today for follow up visit. She was diagnosed with ER/NV neg and her-2 pos IDC of the left breast, s/p neoadjuvant chemotherapy with TCHP x 6 followed by by left lumpectomy and SLNB in 8/2018, achieved pCR, s/p adjuvant WBI and completed maintenance Herceptin and Perjeta, remains KASHMIR.  In 12/2019 she had right lumpectomy and was found to have focal atypical lobular hyperplasia (ALH), and proliferative type fibrocystic changes associated with calcifications from a previous biopsy site. Primary prevention with risk reduction agent was discussed with her previously. She was reluctant.  Today, she c/o worsening pain to her left shoulder area x few months.  No trauma to site reported.  \par Last mammogram / breast US were done 4/2020 which showed no mammographic or sonographic evidence of malignancy. Post lumpectomy changes of the left breast. Postsurgical distortion of the right breast. \par Pt requests for genetic testing to be done today.  Family history includes;  mother- pancreatic ca at 62 yo, maternal aunt- breast cancer at 70's yo, paternal aunt- breast cancer at 70's yo, brother- prostate cancer at 64 yo, cousin- bone cancer at 12 yo.\par \par 01/25/2021: MONICA is here for follow up visit for ER/NV neg and her-2 pos IDC of the left breast, s/p neoadjuvant chemotherapy with TCHP x 6 followed by by left lumpectomy and SLNB in 8/2018, achieved pCR, s/p adjuvant WBI and completed maintenance Herceptin and Perjeta, remains KASHMIR.  In 12/2019 she had right lumpectomy and was found to have focal atypical lobular hyperplasia (ALH), and proliferative type fibrocystic changes associated with calcifications from a previous biopsy site. Primary prevention with risk reduction agent was discussed with her previously. She was reluctant and opted to continue on surveillance. Pain of the left shoulder resolved: xray showed No evidence of acute osseous abnormalities. No sclerotic or lytic bone lesion. Mild AC joint and mild glenohumeral joint degenerative changes. Last diagnostic mammogram and US of left breast completed in Oct 2020 showed stable benign post lumpectomy and postradiation changes in the left breast and no significant change to suggest malignancy. She denies any new breast symptoms at this time. Labs reviewed from [facility].\par \par 7/29/21:\par MONICA is here for follow up visit for ER/NV neg and her-2 pos IDC of the left breast, s/p neoadjuvant chemotherapy with TCHP x 6 followed by by left lumpectomy and SLNB in 8/2018, achieved pCR, s/p adjuvant WBI and completed maintenance Herceptin and Perjeta, remains KASHMIR.  In 12/2019 she had right lumpectomy and was found to have focal atypical lobular hyperplasia (ALH), and proliferative type fibrocystic changes associated with calcifications from a previous biopsy site. She opted not to take risk reduction agent. B/L Breast Mammogram  from 4/22/21 showed no mammographic evidence of malignancy. Today patient reports feeling well. She complains pain in her left breast sometimes.  \par \par 01/26/2022: MONICA is here for follow up visit for ER/NV neg and her-2 pos IDC of the left breast, s/p neoadjuvant chemotherapy with TCHP x 6 followed by by left lumpectomy and SLNB in 8/2018, achieved pCR, s/p adjuvant WBI and completed maintenance Herceptin and Perjeta, remains KASHMIR.  In 12/2019 she had right lumpectomy and was found to have focal atypical lobular hyperplasia (ALH), and proliferative type fibrocystic changes associated with calcifications from a previous biopsy site. She opted not to take risk reduction agent. B/L Breast Mammogram  from 4/22/21 showed no mammographic evidence of malignancy; she will repeat imaging in April. she denies any new breast changes. She continues to have intermittent nerve pain to the surgical bed. \par \par 8/15/22:\par MONICA is here for follow up visit for ER/NV neg and her-2 pos IDC of the left breast diagnosed in 2018, s/p neoadjuvant chemotherapy with TCHP x 6 followed by by left lumpectomy and SLNB in 8/2018, achieved pCR, s/p adjuvant WBI and completed maintenance Herceptin and Perjeta, remains KASHMIR. In 12/2019 she had right lumpectomy and was found to have focal atypical lobular hyperplasia (ALH), and proliferative type fibrocystic changes associated with calcifications from a previous biopsy site. She opted not to take risk reduction agent. On 4/27/22, B/L Breast Mammogram showed an area of architectural distortion at the site of lumpectomy seen in the left breast and an area of benign architectural distortion corresponding to the site of surgery seen in the right breast. b/l breast US on 5/2/22 showed -Right breast at the 11:00 location periareolar region, there appears to be a single duct with debris measuring 3.0 cm x 1.0 cm x 1.9 cm and 6:00 location 2 cm from the nipple of the left breast measuring 3.5 cm x 0.5 cm x 1.7 cm. \par An ultrasound-guided core biopsy of each area is recommended. On 5/4/22, she had US guided biopsies. The left breast mass biopsy showed benign atrophic breast tissue. Left breast mass biopsy showed Intraductal papilloma and benign atrophic breast tissue with proliferative type fibrocystic. On 7/25/22, she had right retroareolar mass, radiofrequency seed localized lumpectomy which revealed Intraductal papilloma containing florid duct hyperplasia located and separate radial sclerosing lesion (radial scar). She recovered well from surgery. She complains right hip pain for a few weeks.\par \par \par \par \par \par \par \par

## 2022-08-16 DIAGNOSIS — C50.412 MALIGNANT NEOPLASM OF UPPER-OUTER QUADRANT OF LEFT FEMALE BREAST: ICD-10-CM

## 2022-08-16 DIAGNOSIS — M25.551 PAIN IN RIGHT HIP: ICD-10-CM

## 2022-08-16 DIAGNOSIS — N64.89 OTHER SPECIFIED DISORDERS OF BREAST: ICD-10-CM

## 2022-09-08 NOTE — PRE-ANESTHESIA EVALUATION ADULT - NSANTHBPHIGHRD_ENT_A_CORE
Spoke with Radha to let her know that the biopsy show fibroadenoma. Per notes you are aware of being benign and follow up is recommened at [facility], per Dr Schneider. She is seeing [facility] in 6 months. Please let Dr Schneider know if it becomes painful or distorting your anatomy, those are typical reasons to refer for surgical excision. You will also be receiving a letter from [facility] with the impression and findings. Thank you.   Yes

## 2022-12-03 NOTE — ASU PATIENT PROFILE, ADULT - MENTAL HEALTH CONDITIONS/SYMPTOMS, PROFILE
[facility]    Patient Name:  Cheyenne Estrella  YOB: 1950  MRN: [id number]  Admit Date:  11/24/2022    Progress note to check on TF. TF order has been canceled per MD. Per RN notes pt received abd CT last night for possible abd bleed. Will continue to follow.     If enteral feeds are to be restarted, please re-consult RD to order.     Electronically signed by:  Mayra Godoy RD  12/03/22 12:55 EST    none

## 2022-12-19 ENCOUNTER — RESULT REVIEW (OUTPATIENT)
Age: 72
End: 2022-12-19

## 2022-12-19 ENCOUNTER — OUTPATIENT (OUTPATIENT)
Dept: OUTPATIENT SERVICES | Facility: HOSPITAL | Age: 72
LOS: 1 days | Discharge: HOME | End: 2022-12-19

## 2022-12-19 DIAGNOSIS — Z90.89 ACQUIRED ABSENCE OF OTHER ORGANS: Chronic | ICD-10-CM

## 2022-12-19 DIAGNOSIS — Z98.890 OTHER SPECIFIED POSTPROCEDURAL STATES: Chronic | ICD-10-CM

## 2022-12-19 DIAGNOSIS — R93.3 ABNORMAL FINDINGS ON DIAGNOSTIC IMAGING OF OTHER PARTS OF DIGESTIVE TRACT: Chronic | ICD-10-CM

## 2022-12-19 DIAGNOSIS — Z87.2 PERSONAL HISTORY OF DISEASES OF THE SKIN AND SUBCUTANEOUS TISSUE: Chronic | ICD-10-CM

## 2022-12-19 DIAGNOSIS — Z90.49 ACQUIRED ABSENCE OF OTHER SPECIFIED PARTS OF DIGESTIVE TRACT: Chronic | ICD-10-CM

## 2022-12-19 DIAGNOSIS — R92.1 MAMMOGRAPHIC CALCIFICATION FOUND ON DIAGNOSTIC IMAGING OF BREAST: Chronic | ICD-10-CM

## 2022-12-19 DIAGNOSIS — R92.8 OTHER ABNORMAL AND INCONCLUSIVE FINDINGS ON DIAGNOSTIC IMAGING OF BREAST: ICD-10-CM

## 2022-12-19 PROCEDURE — G0279: CPT | Mod: 26

## 2022-12-19 PROCEDURE — 77066 DX MAMMO INCL CAD BI: CPT | Mod: 26

## 2022-12-19 PROCEDURE — 76641 ULTRASOUND BREAST COMPLETE: CPT | Mod: 26,50

## 2023-01-15 ENCOUNTER — NON-APPOINTMENT (OUTPATIENT)
Age: 73
End: 2023-01-15

## 2023-02-06 ENCOUNTER — APPOINTMENT (OUTPATIENT)
Dept: BREAST CENTER | Facility: CLINIC | Age: 73
End: 2023-02-06
Payer: MEDICARE

## 2023-02-06 VITALS
BODY MASS INDEX: 26.21 KG/M2 | SYSTOLIC BLOOD PRESSURE: 128 MMHG | HEIGHT: 67 IN | DIASTOLIC BLOOD PRESSURE: 85 MMHG | WEIGHT: 167 LBS

## 2023-02-06 DIAGNOSIS — D24.1 BENIGN NEOPLASM OF RIGHT BREAST: ICD-10-CM

## 2023-02-06 DIAGNOSIS — C50.912 MALIGNANT NEOPLASM OF UNSPECIFIED SITE OF LEFT FEMALE BREAST: ICD-10-CM

## 2023-02-06 PROCEDURE — 99212 OFFICE O/P EST SF 10 MIN: CPT

## 2023-02-06 NOTE — PHYSICAL EXAM
[Normocephalic] : normocephalic [Atraumatic] : atraumatic [EOMI] : extra ocular movement intact [Examined in the supine and seated position] : examined in the supine and seated position [No dominant masses] : no dominant masses in right breast  [No dominant masses] : no dominant masses left breast [No Nipple Retraction] : no left nipple retraction [No Nipple Discharge] : no left nipple discharge [No Axillary Lymphadenopathy] : no left axillary lymphadenopathy [No Edema] : no edema [No Rashes] : no rashes [No Ulceration] : no ulceration [de-identified] : On physical exam, there are no discrete masses in either breast or axilla. There is no nipple discharge or inversion bilaterally. There are no skin changes bilaterally.\par \par

## 2023-02-06 NOTE — DATA REVIEWED
[FreeTextEntry1] : ACC: [id number]     EXAM:  MG US BREAST COMPLETE BI\par ACC: [id number]     EXAM:  MG MAMMO DIAG W ZACH BI#\par   12/19/2022\par INTERPRETATION:  Clinical History / Reason for exam: Personal history of breast cancer as the patient is status post a left lumpectomy in January 2018.\par \par The patient reports her last clinical breast examination was performed within the past year\par \par Diagnostic bilateral mammogram including tomography was performed and submitted for evaluation.\par \par Computer-aided detection was utilized in the interpretation of this examination.\par \par Comparison is made to the prior study dated April 2020.\par \par Breast composition:There are scattered areas of fibroglandular density.\par \par The patient is status post a left lumpectomy and right breast excisional biopsy with stable architectural distortion most consistent with postsurgical change.  No suspicious masses or abnormal groups of microcalcifications are seen in either breast.\par \par Whole Bilateral Breast Sonogram:\par \par In the right breast at the 2:00 location 7 cm from the nipple is a stable benign  oval circumscribed hypoechoic mass measuring 1.9 cm x 1.3 cm x 0.4 cm as well as a stable benign previously biopsied mass at the 4:00 location 2 to 3 cm from the nipple measuring 1.1 cm x 0.3 cm x 0.8 cm and stable previously biopsied benign heterogeneous mass in the left breast at the 6:00 location 2 cm from the nipple measuring 3.0 cm x 0.5 cm x 0.4 cm.\par \par Evaluation of both axillary regions demonstrates normal-appearing lymph nodes.\par Impression: Status post a left lumpectomy and right breast excisional biopsy with stable architectural distortion most consistent with postsurgical change.\par \par Stable benign nodularity in both breasts as above.\par \par No evidence of malignancy.\par \par Recommendation: Unless otherwise indicated by clinical findings, annual screening mammography recommended.\par \par BI-RADS Category 2: Benign\par

## 2023-02-06 NOTE — ASSESSMENT
[FreeTextEntry1] : ALEK is a akbar 72 year old patient who presented today in follow up for a history of Stage IIA left breast cancer.  With dx of right breast intraductal papilloma  s/p Right WLE w/ RF loc for an intraductal papilloma - 07/25/2022.\par \par Her imaging is as follows:\par 12/19/2022 b/l dx mammo and US\par - scattered areas of fibroglandular density.\par - patient is status post a left lumpectomy and right breast excisional biopsy with stable architectural distortion most consistent with postsurgical change.  \par \par RIGHT US:\par -@2N 7 stable benign  oval circumscribed hypoechoic mass measuring 1.9 cm x 1.3 cm x 0.4 cm as well as a stable benign previously biopsied mass at the 4:00 location 2 to 3 cm from the nipple measuring 1.1 cm x 0.3 cm x 0.8 cm and stable previously biopsied benign heterogeneous mass in the left breast at the 6:00 location 2 cm from the nipple measuring 3.0 cm x 0.5 cm x 0.4 cm.\par BIRADS2\par \par \par AS REVIEW:\par Her final surgical pathology revealed intraductal papilloma located adjacent to healing prior biopsy site changes and separate radial sclerosing lesion (radial scar).\par \par AS REVIEW: \par Her imaging is as follows:\par 05/04/2022\par Site 1, left breast US guided bx:(mini-cork) \par -Benign atrophic breast tissue with dominant paucicellular stromal fibrosis and rare microcalcifications present in benign ductules.\par Site 2, right breast US guided bx:(stop-light) \par -Intraductal papilloma.\par -Benign atrophic breast tissue with proliferative type fibrocystic changes associated with microcalcifications.\par \par \par Intraductal papillomas without atypia are considered fibroproliferative lesions without atypia.  Patients with these lesions were found to have a slightly increased relative risk of breast cancer compared to the reference population.  However the lesions themselves do not have any malignant potential. \par \par Although newer studies regarding the upgrade rate (to cancer) ranges from 0-14% on surgical excision, with pathologic/radiologic concordance, older studies found a higher upgrade rate.  I have recommended surgical excision for this reason.  Because it is not readily palpable, I will have her undergo a preoperative radiofrequency tag localization.  \par \par The risks and benefits of the procedure were explained to the patient, including bleeding, infection, seroma/hematoma formation, and possible re-operation if the surgical excision yields an upgrade to cancer with positive margins. Informed consent was obtained today.\par \par PLAN:\par -DX: RIGHT BREAST INTRADUCTAL PAPILLOMA\par -b/l  Mammo  -12/20/23\par -Follow-up after.

## 2023-02-06 NOTE — HISTORY OF PRESENT ILLNESS
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  Mother had pancreatic cancer.  Brother has prostate cancer.  No BRCA testing in family.  \par \par Status post insertion of a mediport on 3/5/18.   Status post re-placement of mediport 3/29/18 for neoadjuvant chemotherapy.  Completed Herceptin and Perjeta.  \par \par Status post Left NLOC of three areas and SLNB 8/24/18 demonstrating 0/2 (-); 3. Breast, left 12 o'clock mass, needle localized lumpectomy: Sclerosing large duct papilloma associated with florid duct hyperplasia and coarse stromal calcifications, radial sclerosing lesion (radial scar).  Diffuse histologic changes consistent with prior systemic therapy effect.  Focal healing prior biopsy site change with no residual carcinoma identified.  AJCC 8th Edition Pathologic Stage: ypT0, p(sn)N0, pMx, pCR (pathologic complete response to neoadjuvant therapy).\par Breast, left 11 o'clock mass, needle localized lumpectomy: Radial sclerosing lesion (radial scar) located adjacent to healing prior biopsy site changes.\par Breast, left 9 o'clock mass, needle localized lumpectomy: Sclerosing small duct papilloma containing florid duct hyperplasia with adjacent healing prior biopsy site changes.\par \par s/p adjuvant RT w/ Dr. Kennedy Muñoz.\par \par Left breast fragments of hyalinized fibroadenoma containing coarse stromal calcifications with adjacent benign atrophic fatty breast tissue on stereotactic core biopsy 10/28/19; Posterior (tophat).  Findings are benign concordant and 6-month followup left breast mammogram recommended.\par \par Right breast radial sclerosing lesion on ultrasound guided core biopsy 10/28/19; 2:00 N2, 8 mm (top hat).  Surgical excision recommended.  \par \par Status post Right NLOC and removal of mediport 12/13/19 demonstrating large duct sclerosing papilloma containing focal florid duct hyperplasia and focal atypical lobular hyperplasia.  \par \par MONICA ALMONTE is a 71 year old female patient who presents today in follow up for Stage IIA left breast cancer.\par Since her last visit, she has no new breast related complaints. \par \par Her most recent imaging:\par B/L Screening Mammo - 04/27/2022:\par -There are scattered areas of fibroglandular density.\par -There is an area of architectural distortion at the site of lumpectomy seen in the left breast.\par -There is an area of benign architectural distortion corresponding to the site of surgery seen in the right breast.\par -There is no mammographic evidence of malignancy.\par BI-RADS Category 2:  Benign\par \par B/L Breast Sono - 04/27/2022:\par Right breast:\par -At the 2:00 position 7 cm from the nipple, there is a stable hypoechoic mass measuring 1.9 x 1.3 x 0.4 cm, benign.\par -At the 4:00 position 2 cm from the nipple, there is a previously biopsied benign mass measuring 1.1 x 0.3 x 0.8 cm.\par -At the 11:00 position periareolar region, there is a heterogeneous mass measuring 3.0 x 1.0 x 1.9 cm, indeterminate. \par -No axillary adenopathy.\par Left breast:\par -At the 6:00 position 2 cm from the nipple (periareolar region), there is a heterogeneous mass measuring 3.5 x 0.5 x 1.8 cm. Real-time ultrasound is recommended.\par -Benign scar is noted at the lumpectomy site at the 12:00 position.\par -No axillary adenopathy.\par BI-RADS Category 0:  Incomplete: Needs Additional Imaging Evaluation\par \par B/L Breast Sono - 05/02/2022:\par -Right breast at the 11:00 location periareolar region, there appears to be a single duct with debris measuring 3.0 cm x 1.0 cm x 1.9 cm\par -6:00 location 2 cm from the nipple of the left breast measuring 3.5 cm x 0.5 cm x 1.7 cm. \par An ultrasound-guided core biopsy of each area is recommended.\par BI-RADS Category 4: Suspicious\par \par The recommended biopsies have not yet been performed.\par \par INTERVAL HISTORY 6/21/22\par Monica is here to discuss her new dx of RIGHT breast Intraductal papilloma.\par She has no breast related complaints at this time.  She denies any breast pain, has not palpated any new palpable masses in either breast and denies any nipple discharge or retraction.\par \par Her imaging is as follows:\par 05/04/2022\par Site 1, left breast US guided bx:(mini-cork) \par -Benign atrophic breast tissue with dominant paucicellular stromal fibrosis and rare microcalcifications present in benign ductules.\par Site 2, right breast US guided bx:(stop-light) \par -Intraductal papilloma.\par -Benign atrophic breast tissue with proliferative type fibrocystic changes associated with microcalcifications.\par \par 08/05/2022--\par MONICA ALMONTE is a 71 year old female patient who presents today Pt presents to the office for her initial post-operative visit.\par She is s/p Right WLE w/ RF loc for an intraductal papilloma - 07/25/2022.\par She is feeling well.\par She denies any fever / chills or erythema and / or drainage related to the incision.\par Her pain is well controlled, only complains of mild soreness of the area.\par \par Her final surgical pathology revealed intraductal papilloma located adjacent to healing prior biopsy site changes and separate radial sclerosing lesion (radial scar).\par \par \par INTERVAL HISTORY 2/6/23\dona Ochoaie is here for her six months follow up visit \par She has no breast related complaints at this time.  She denies any breast pain, has not palpated any new palpable masses in either breast and denies any nipple discharge or retraction.\par \par Her imaging is as follows:\par 12/19/2022 b/l dx mammo and US\par - scattered areas of fibroglandular density.\par - patient is status post a left lumpectomy and right breast excisional biopsy with stable architectural distortion most consistent with postsurgical change.  \par \par RIGHT US:\par -@2N 7 stable benign  oval circumscribed hypoechoic mass measuring 1.9 cm x 1.3 cm x 0.4 cm as well as a stable benign previously biopsied mass at the 4:00 location 2 to 3 cm from the nipple measuring 1.1 cm x 0.3 cm x 0.8 cm and stable previously biopsied benign heterogeneous mass in the left breast at the 6:00 location 2 cm from the nipple measuring 3.0 cm x 0.5 cm x 0.4 cm.\par BIRADS2\par

## 2023-02-13 ENCOUNTER — OUTPATIENT (OUTPATIENT)
Dept: OUTPATIENT SERVICES | Facility: HOSPITAL | Age: 73
LOS: 1 days | End: 2023-02-13
Payer: MEDICARE

## 2023-02-13 ENCOUNTER — APPOINTMENT (OUTPATIENT)
Dept: HEMATOLOGY ONCOLOGY | Facility: CLINIC | Age: 73
End: 2023-02-13

## 2023-02-13 ENCOUNTER — LABORATORY RESULT (OUTPATIENT)
Age: 73
End: 2023-02-13

## 2023-02-13 ENCOUNTER — APPOINTMENT (OUTPATIENT)
Dept: HEMATOLOGY ONCOLOGY | Facility: CLINIC | Age: 73
End: 2023-02-13
Payer: MEDICARE

## 2023-02-13 VITALS
HEART RATE: 74 BPM | HEIGHT: 67 IN | WEIGHT: 172 LBS | TEMPERATURE: 98 F | BODY MASS INDEX: 27 KG/M2 | RESPIRATION RATE: 16 BRPM | DIASTOLIC BLOOD PRESSURE: 87 MMHG | SYSTOLIC BLOOD PRESSURE: 131 MMHG

## 2023-02-13 DIAGNOSIS — R93.3 ABNORMAL FINDINGS ON DIAGNOSTIC IMAGING OF OTHER PARTS OF DIGESTIVE TRACT: Chronic | ICD-10-CM

## 2023-02-13 DIAGNOSIS — C50.912 MALIGNANT NEOPLASM OF UNSPECIFIED SITE OF LEFT FEMALE BREAST: ICD-10-CM

## 2023-02-13 DIAGNOSIS — Z90.89 ACQUIRED ABSENCE OF OTHER ORGANS: Chronic | ICD-10-CM

## 2023-02-13 DIAGNOSIS — Z98.890 OTHER SPECIFIED POSTPROCEDURAL STATES: Chronic | ICD-10-CM

## 2023-02-13 DIAGNOSIS — R92.1 MAMMOGRAPHIC CALCIFICATION FOUND ON DIAGNOSTIC IMAGING OF BREAST: Chronic | ICD-10-CM

## 2023-02-13 DIAGNOSIS — Z87.2 PERSONAL HISTORY OF DISEASES OF THE SKIN AND SUBCUTANEOUS TISSUE: Chronic | ICD-10-CM

## 2023-02-13 DIAGNOSIS — Z90.49 ACQUIRED ABSENCE OF OTHER SPECIFIED PARTS OF DIGESTIVE TRACT: Chronic | ICD-10-CM

## 2023-02-13 LAB
ALBUMIN SERPL ELPH-MCNC: 4.5 G/DL
ALP BLD-CCNC: 58 U/L
ALT SERPL-CCNC: 33 U/L
ANION GAP SERPL CALC-SCNC: 18 MMOL/L
AST SERPL-CCNC: 26 U/L
BILIRUB DIRECT SERPL-MCNC: <0.2 MG/DL
BILIRUB INDIRECT SERPL-MCNC: NORMAL MG/DL
BILIRUB SERPL-MCNC: 0.3 MG/DL
BUN SERPL-MCNC: 21 MG/DL
CALCIUM SERPL-MCNC: 10 MG/DL
CANCER AG15-3 SERPL-ACNC: 22.3 U/ML
CEA SERPL-MCNC: 1.4 NG/ML
CHLORIDE SERPL-SCNC: 101 MMOL/L
CO2 SERPL-SCNC: 21 MMOL/L
CREAT SERPL-MCNC: 0.8 MG/DL
EGFR: 78 ML/MIN/1.73M2
GLUCOSE SERPL-MCNC: 128 MG/DL
HCT VFR BLD CALC: 45 %
HGB BLD-MCNC: 14.6 G/DL
MCHC RBC-ENTMCNC: 29.4 PG
MCHC RBC-ENTMCNC: 32.4 G/DL
MCV RBC AUTO: 90.5 FL
PLATELET # BLD AUTO: 205 K/UL
PMV BLD: 9.6 FL
POTASSIUM SERPL-SCNC: 4.4 MMOL/L
PROT SERPL-MCNC: 7.1 G/DL
RBC # BLD: 4.97 M/UL
RBC # FLD: 12.6 %
SODIUM SERPL-SCNC: 140 MMOL/L
WBC # FLD AUTO: 5.37 K/UL

## 2023-02-13 PROCEDURE — 99213 OFFICE O/P EST LOW 20 MIN: CPT

## 2023-02-13 PROCEDURE — 86300 IMMUNOASSAY TUMOR CA 15-3: CPT

## 2023-02-13 PROCEDURE — 85027 COMPLETE CBC AUTOMATED: CPT

## 2023-02-13 PROCEDURE — 83519 RIA NONANTIBODY: CPT

## 2023-02-13 PROCEDURE — 80048 BASIC METABOLIC PNL TOTAL CA: CPT

## 2023-02-13 PROCEDURE — 82378 CARCINOEMBRYONIC ANTIGEN: CPT

## 2023-02-13 PROCEDURE — 82310 ASSAY OF CALCIUM: CPT

## 2023-02-13 PROCEDURE — 83970 ASSAY OF PARATHORMONE: CPT

## 2023-02-13 PROCEDURE — 80076 HEPATIC FUNCTION PANEL: CPT

## 2023-02-13 NOTE — HISTORY OF PRESENT ILLNESS
[de-identified] : 68 yo female is here for f/u visit and chemotherapy. She was recently diagnosed left breast cancer. She had screening mammogram in 06/2015 and it was normal. In Jan 2018 she noticed a lump in left breast. A diagnostic mammo on 1/18/2018 showed a  2.1  cm  mass In  the  region  of  patient's  palpable  abnormality.  No  other  suspicious  masses,  areas of  architectural  distortion  or  cluster  of  microcalcifications  in  either  breast.  Evaluation  of  the  left  axilla  demonstrates  normal-appearing  lymph  nodes.\par   \par Targeted  Left  Breast  Sonogram showed  \par In  the  region  of  the  patient's  palpable  abnormality  at  the  12:00  location  3  cm  from  the  nipple,  there  is  an  irregular  hypoechoic  mass  measuring  5.2  cm  x  3.2  cm  x  1.2  cm  which  corresponds  to  mammographic  findings. \par Other  masses  also  identified  within  the  left  breast  and  are  as  follows: 6:00  location  2  cm  from  the  nipple  measuring  4.1  cm  x  2.4  cm  x  1.4  cm,  9-10  o'clock  location  4  cm  from  the  nipple  measuring  2.1  cm  1.4  cm  x  0.4  cm  and  11:00  location  5  to  6  cm  from  the  nipple  measuring  1.3  cm  x  2.1  cm  x  0.4  cm.   An  ultrasound-guided  core  biopsy  of  the  each  mass  is  recommended\par \par She then had USG guided biopsies of those lesions. Pathology showed \par Site  A:  Left  breast,  11:00  N5-6. \par Intraductal  papilloma  containing  florid  duct  hyperplasia. \par   \par Site  B:  Left  breast  9-10  o'clock  N4. \par -Complex  sclerosing  lesion  (radial  scar)  containing  focal  florid  duct  hyperplasia.  \par -Atrophic  breast  tissue  associated  with  coarse  stromal  calcifications. \par  Site  C:  Left  Breast  6:00  N2 \par -Benign  breast  tissue  with  proliferative  type  fibrocystic  changes  including  dominant  dense  stromal  fibrosis,  focal  florid  duct  hyperplasia,  sclerosing  adenosis,  macrocyst  wall  fragments,  columnar  cell  change/hyperplasia  without  atypia,  and  rare  microcalcifications  present  in  benign  ducts. \par   \par Site  D:  Left  breast  12:00  N3 \par -Invasive  poorly  differentiated  duct  carcinoma  with  focal  necrosis, ER negative, NV <1%, Her-2 positive 3+ by IHC and Ki 67 50%.\par   \par Recommendation:  Surgical  excision.  Surgical  and  oncologic  follow-up  for  the  biopsy-proven  12:00  carcinoma  is  recommended.    Surgical  planning  for  excision  of  the  11:00  and  9-10  o'clock  high  risk  lesions  also  recommended\par \par On 2/13/2018, PET/CT  showed abnormal increased uptake is seen in multiple areas in the left breast. Max SUV 13.92 in the superior breast mass near surgical clip findings are \par consistent with biopsy proven malignancy in the superior mass. Remaining of the abnormal areas of increased uptake could be malignancy and/or postinflammatory nature. Correlate the findings with the biopsy reports.  There are PET positive left axillary lymph nodes with a max SUV 2.56. Findings could be postinflammatory and/or malignancy. \par No other areas of abnormal increased uptake is seen to suggest biologic tumor activity. \par \par On 2/15/18,  MRI breast was done. the report is still pending.\par \par The patient saw Dr. Hampton for surgical consultation. In light of large Her-2 positive tumor, she was given an option to have neoadjuvant chemotherapy followed by surgery. Monica is here with her sister for consultation of neoadjuvant systemic therapy. [de-identified] : Monica has completed neoadjuvant chemotherapy with TCHP x 6 times.  She responded well and her left breast mass has reduced significantly. She complains fatigue, \par She experienced side effects from the chemotherapy including diarrhea, poor appetite and insomnia. These symptoms have got better since chemo completed.\par \par On 7/27/18, she had PET/CT which showed No pathologic FDG uptake to suggest definite biologic tumor activity. All previously seen pathologic FDG uptake in the left breast has resolved. Previously seen mild FDG uptake in left axillary nodes has resolved. Findings are consistent with good treatment response. Mildly FDG avid portacaval lymph node, SUV max 5.7, is nonspecific but less likely to represent biologic tumor activity. This can be reassessed on follow-up. \par \par On 7/23/18, she had b/l breast MRI which showed Interval decrease in size of left breast 12:00 axis biopsy-proven invasive ductal carcinoma; currently it measures up to 1.0 cm, previously measuring up to 3.2 cm. \par \par 9/7/18:\par The patient is here today for followup and treatment. She underwent NLOC left lumpectomy and SLNB on 8/24/18. The pathology showed no evidence of residual disease and 2 SLN negative. She recovered well from surgery.\par She still has loose bowel movement sometimes. her appetite is improving.\par \par 10/19/2018\par Patient is here for a follow up prior to getting treatment with Herceptin and Perjeta. Her lumpectomy scars have healed up well and she has some mild tenderness. It assured that it will get better. She was concerned about irregularity at incision site and again we discussed the mammography findings which showed post lumpectomy changes ( 10/2018) and that it is scar tissue. \par She had mapping done for radiation, yet to start though. \par Her loose bowel movements have improved as well. \par She offers no other complaints.\par \par 12/28/18:\par The patient is here for followup visit. She completed radiation in 11/2018. She feels well and does not have new complains. In 10/2018, she had b/l dx mammo. There was no suspicious finding. In 11/2018, she had 2D echo. Her LVEF was 50-55%.\par \par 2/8/19: Pt is here for a follow-up visit. She is doing well on herceptin and perjeta. Last echo from Nov 2018 showed EF of 55-65%. No fresh complaints. Last US from Jan 2019 showed:\par Oval, hypoechoic, circumscribed, parallel mass in the right breast, 4:00  axis, 2 to 3 cm from the nipple measures 0.9 x 0.4 x 1.1 cm. This is not  significantly changed in size from 2/26/2018 where it measured 1.0 x 0.4  x 1.1 cm, and was previously biopsy proven to be benign.\par   Additional oval, circumscribed, hypoechoic mass in the right breast, 2:00  axis, 7 cm from the nipple measures 1.7 x 1.2 x 0.5 cm. This was likely  present on the prior MRI of 2/15/2018 (series 8, image 49) and is also  likely benign. Continued sonographic follow-up is recommended.\par   Benign post lumpectomy changes are seen in the left breast, 12:00 axis.  No suspicious solid or cystic masses are seen in the left breast.\par \par 4/12/19:\par The patient is here for f/u visit. She complains RUQ pain for a few weeks. She saw Dr. Manzo and had abdominal sonogram which showed Cholelithiasis without sonographic evidence of acute cholecystitis. CBD dilatation (11 mm), new since March 2016. Recommend correlation with laboratory tests. If indicated, MRCP can be obtained to exclude CBD \par obstruction or stone. She has been taking Cipro and Flagyl. She did not have any fever. She is given a referral for MRCP but she has not made an appointment yet.\par She has b/l dx mammo on 4/219. There was no suspicious finding.\par \par 7/26/2019: \par The patient is here for follow up. Since last visit , she had a cholecystectomy which showed chronic cholecystitis and cholelithiasis. Her bilirubin and liver enzymes normalized after surgery. She is also switched to venlafaxine for her anxiety. She is feeling much better. She is trying to exercise and regain energy. She complains left axillary pain at the site of SNLBx . She is also concerned about multiple lesions of her breasts that were proven to be benign in the past. She is due for a mammogram in October 2019.\par The patient also reported palpitations when she gets up quickly from a seating or a sleeping position , and bilateral ankle swelling by the end of the day . She will see her PMD to adjust her medications. \par \par 11/11/19\par Patient is here today for follow up visit.  She had a mammogram 10/2019 which showed calcification in left breast and a radial sclerosing lesion on biopsy of right breast.  She will have a lumpectomy on right breast for this high risk benign lesion and port removal on same day-12/11/19 by Dr. Hampton.  She offers no further complaints.\par \par 5/4/2020:\par Patient is evaluated via telehealth. She has IDC of the left breast, ER/UT negative and Her-2 positive, with large tumor mass and FDG avid left axillary adenopathy, clinical stage T3N1, s/p neoadjuvant chemotherapy followed by left lumpectomy and SLNBin 8/2018, achieved pCR - followed by  maintenance herceptin and Perjeta. She received adjuvant breast radiotherapy. She had a mammogram 10/2019 which showed calcification in left breast and a radial sclerosing lesion on biopsy of right breast. 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 She recovered well from surgery. She does not have any new complains.\par \par 8/3/2020\par 70 yo female is here today for follow up visit. She was diagnosed with ER/UT neg and her-2 pos IDC of the left breast, s/p neoadjuvant chemotherapy with TCHP x 6 followed by by left lumpectomy and SLNB in 8/2018, achieved pCR, s/p adjuvant WBI and completed maintenance Herceptin and Perjeta, remains KASHMIR.  In 12/2019 she had right lumpectomy and was found to have focal atypical lobular hyperplasia (ALH), and proliferative type fibrocystic changes associated with calcifications from a previous biopsy site. Primary prevention with risk reduction agent was discussed with her previously. She was reluctant.  Today, she c/o worsening pain to her left shoulder area x few months.  No trauma to site reported.  \par Last mammogram / breast US were done 4/2020 which showed no mammographic or sonographic evidence of malignancy. Post lumpectomy changes of the left breast. Postsurgical distortion of the right breast. \par Pt requests for genetic testing to be done today.  Family history includes;  mother- pancreatic ca at 62 yo, maternal aunt- breast cancer at 70's yo, paternal aunt- breast cancer at 70's yo, brother- prostate cancer at 64 yo, cousin- bone cancer at 12 yo.\par \par 01/25/2021: MONICA is here for follow up visit for ER/UT neg and her-2 pos IDC of the left breast, s/p neoadjuvant chemotherapy with TCHP x 6 followed by by left lumpectomy and SLNB in 8/2018, achieved pCR, s/p adjuvant WBI and completed maintenance Herceptin and Perjeta, remains KASHMIR.  In 12/2019 she had right lumpectomy and was found to have focal atypical lobular hyperplasia (ALH), and proliferative type fibrocystic changes associated with calcifications from a previous biopsy site. Primary prevention with risk reduction agent was discussed with her previously. She was reluctant and opted to continue on surveillance. Pain of the left shoulder resolved: xray showed No evidence of acute osseous abnormalities. No sclerotic or lytic bone lesion. Mild AC joint and mild glenohumeral joint degenerative changes. Last diagnostic mammogram and US of left breast completed in Oct 2020 showed stable benign post lumpectomy and postradiation changes in the left breast and no significant change to suggest malignancy. She denies any new breast symptoms at this time. Labs reviewed from [employer].\par \par 7/29/21:\par MONICA is here for follow up visit for ER/UT neg and her-2 pos IDC of the left breast, s/p neoadjuvant chemotherapy with TCHP x 6 followed by by left lumpectomy and SLNB in 8/2018, achieved pCR, s/p adjuvant WBI and completed maintenance Herceptin and Perjeta, remains KASHMIR.  In 12/2019 she had right lumpectomy and was found to have focal atypical lobular hyperplasia (ALH), and proliferative type fibrocystic changes associated with calcifications from a previous biopsy site. She opted not to take risk reduction agent. B/L Breast Mammogram  from 4/22/21 showed no mammographic evidence of malignancy. Today patient reports feeling well. She complains pain in her left breast sometimes.  \par \par 01/26/2022: MONICA is here for follow up visit for ER/UT neg and her-2 pos IDC of the left breast, s/p neoadjuvant chemotherapy with TCHP x 6 followed by by left lumpectomy and SLNB in 8/2018, achieved pCR, s/p adjuvant WBI and completed maintenance Herceptin and Perjeta, remains KASHMIR.  In 12/2019 she had right lumpectomy and was found to have focal atypical lobular hyperplasia (ALH), and proliferative type fibrocystic changes associated with calcifications from a previous biopsy site. She opted not to take risk reduction agent. B/L Breast Mammogram  from 4/22/21 showed no mammographic evidence of malignancy; she will repeat imaging in April. she denies any new breast changes. She continues to have intermittent nerve pain to the surgical bed. \par \par 8/15/22:\par MONICA is here for follow up visit for ER/UT neg and her-2 pos IDC of the left breast diagnosed in 2018, s/p neoadjuvant chemotherapy with TCHP x 6 followed by by left lumpectomy and SLNB in 8/2018, achieved pCR, s/p adjuvant WBI and completed maintenance Herceptin and Perjeta, remains KASHMIR. In 12/2019 she had right lumpectomy and was found to have focal atypical lobular hyperplasia (ALH), and proliferative type fibrocystic changes associated with calcifications from a previous biopsy site. She opted not to take risk reduction agent. On 4/27/22, B/L Breast Mammogram showed an area of architectural distortion at the site of lumpectomy seen in the left breast and an area of benign architectural distortion corresponding to the site of surgery seen in the right breast. b/l breast US on 5/2/22 showed -Right breast at the 11:00 location periareolar region, there appears to be a single duct with debris measuring 3.0 cm x 1.0 cm x 1.9 cm and 6:00 location 2 cm from the nipple of the left breast measuring 3.5 cm x 0.5 cm x 1.7 cm. \par An ultrasound-guided core biopsy of each area is recommended. On 5/4/22, she had US guided biopsies. The left breast mass biopsy showed benign atrophic breast tissue. Left breast mass biopsy showed Intraductal papilloma and benign atrophic breast tissue with proliferative type fibrocystic. On 7/25/22, she had right retroareolar mass, radiofrequency seed localized lumpectomy which revealed Intraductal papilloma containing florid duct hyperplasia located and separate radial sclerosing lesion (radial scar). She recovered well from surgery. She complains right hip pain for a few weeks.\par \par 2/13/22:\dona GASTON is here for follow up visit for ER/UT neg and her-2 pos IDC of the left breast diagnosed in 2018, s/p neoadjuvant chemotherapy with TCHP x 6 followed by by left lumpectomy and SLNB in 8/2018, achieved pCR, s/p adjuvant WBI and completed maintenance Herceptin and Perjeta, remains KASHMIR. In 12/2019 she had right lumpectomy and was found to have focal atypical lobular hyperplasia (ALH), and proliferative type fibrocystic changes associated with calcifications from a previous biopsy site. She opted not to take risk reduction agent. On 4/27/22, B/L Breast Mammogram showed an area of architectural distortion at the site of lumpectomy seen in the left breast and an area of benign architectural distortion corresponding to the site of surgery seen in the right breast. b/l breast US on 5/2/22 showed -Right breast at the 11:00 location periareolar region, there appears to be a single duct with debris measuring 3.0 cm x 1.0 cm x 1.9 cm and 6:00 location 2 cm from the nipple of the left breast measuring 3.5 cm x 0.5 cm x 1.7 cm. \par An ultrasound-guided core biopsy of each area is recommended. On 5/4/22, she had US guided biopsies. The left breast mass biopsy showed benign atrophic breast tissue. Left breast mass biopsy showed Intraductal papilloma and benign atrophic breast tissue with proliferative type fibrocystic. On 7/25/22, she had right retroareolar mass, radiofrequency seed localized lumpectomy which revealed Intraductal papilloma containing florid duct hyperplasia located and separate radial sclerosing lesion (radial scar). She recovered well from surgery. No complaint to offer at this time.

## 2023-02-13 NOTE — PHYSICAL EXAM
[Restricted in physically strenuous activity but ambulatory and able to carry out work of a light or sedentary nature] : Status 1- Restricted in physically strenuous activity but ambulatory and able to carry out work of a light or sedentary nature, e.g., light house work, office work [Normal] : affect appropriate [de-identified] : Left breast lumpectomy surgical incision healed well. No tenderness. left SLNB scar healed well.

## 2023-02-13 NOTE — ASSESSMENT
[FreeTextEntry1] : 1. 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 Mild hypercalcemia, resolved now\par \par Assessment and Plan:\par -- Breast imaging and biopsies reviewed. B/L Dx mammogram done on 12/30/22 shows stable benign nodularity in both breasts w/ no evidence of malignancy. \par -- Breast exam today did not reveal palpable abnormality. She is scheduled for b/l dx mammo in 12/2023 w/ breast surgery team.\par -- Order blood work for CBC, CMP and tumor markers.\par -- will check PTH since serum calcium was mildly elevated.  \par -- RTO for followup visit in 6 months.\par \par \par

## 2023-02-14 DIAGNOSIS — C50.912 MALIGNANT NEOPLASM OF UNSPECIFIED SITE OF LEFT FEMALE BREAST: ICD-10-CM

## 2023-04-13 ENCOUNTER — OUTPATIENT (OUTPATIENT)
Dept: OUTPATIENT SERVICES | Facility: HOSPITAL | Age: 73
LOS: 1 days | End: 2023-04-13
Payer: MEDICARE

## 2023-04-13 DIAGNOSIS — Z98.890 OTHER SPECIFIED POSTPROCEDURAL STATES: Chronic | ICD-10-CM

## 2023-04-13 DIAGNOSIS — Z90.49 ACQUIRED ABSENCE OF OTHER SPECIFIED PARTS OF DIGESTIVE TRACT: Chronic | ICD-10-CM

## 2023-04-13 DIAGNOSIS — Z90.89 ACQUIRED ABSENCE OF OTHER ORGANS: Chronic | ICD-10-CM

## 2023-04-13 DIAGNOSIS — R93.3 ABNORMAL FINDINGS ON DIAGNOSTIC IMAGING OF OTHER PARTS OF DIGESTIVE TRACT: Chronic | ICD-10-CM

## 2023-04-13 DIAGNOSIS — Z87.2 PERSONAL HISTORY OF DISEASES OF THE SKIN AND SUBCUTANEOUS TISSUE: Chronic | ICD-10-CM

## 2023-04-13 DIAGNOSIS — R06.2 WHEEZING: ICD-10-CM

## 2023-04-13 DIAGNOSIS — R92.1 MAMMOGRAPHIC CALCIFICATION FOUND ON DIAGNOSTIC IMAGING OF BREAST: Chronic | ICD-10-CM

## 2023-04-13 PROCEDURE — 71046 X-RAY EXAM CHEST 2 VIEWS: CPT | Mod: 26

## 2023-04-13 PROCEDURE — 71046 X-RAY EXAM CHEST 2 VIEWS: CPT

## 2023-04-14 DIAGNOSIS — R06.2 WHEEZING: ICD-10-CM

## 2023-04-18 DIAGNOSIS — R59.0 LOCALIZED ENLARGED LYMPH NODES: ICD-10-CM

## 2023-04-19 ENCOUNTER — RESULT REVIEW (OUTPATIENT)
Age: 73
End: 2023-04-19

## 2023-04-19 ENCOUNTER — OUTPATIENT (OUTPATIENT)
Dept: OUTPATIENT SERVICES | Facility: HOSPITAL | Age: 73
LOS: 1 days | End: 2023-04-19
Payer: MEDICARE

## 2023-04-19 DIAGNOSIS — Z90.49 ACQUIRED ABSENCE OF OTHER SPECIFIED PARTS OF DIGESTIVE TRACT: Chronic | ICD-10-CM

## 2023-04-19 DIAGNOSIS — R92.1 MAMMOGRAPHIC CALCIFICATION FOUND ON DIAGNOSTIC IMAGING OF BREAST: Chronic | ICD-10-CM

## 2023-04-19 DIAGNOSIS — Z87.2 PERSONAL HISTORY OF DISEASES OF THE SKIN AND SUBCUTANEOUS TISSUE: Chronic | ICD-10-CM

## 2023-04-19 DIAGNOSIS — R93.3 ABNORMAL FINDINGS ON DIAGNOSTIC IMAGING OF OTHER PARTS OF DIGESTIVE TRACT: Chronic | ICD-10-CM

## 2023-04-19 DIAGNOSIS — N83.10 CORPUS LUTEUM CYST OF OVARY, UNSPECIFIED SIDE: ICD-10-CM

## 2023-04-19 DIAGNOSIS — Z98.890 OTHER SPECIFIED POSTPROCEDURAL STATES: Chronic | ICD-10-CM

## 2023-04-19 DIAGNOSIS — Z90.89 ACQUIRED ABSENCE OF OTHER ORGANS: Chronic | ICD-10-CM

## 2023-04-19 DIAGNOSIS — R92.8 OTHER ABNORMAL AND INCONCLUSIVE FINDINGS ON DIAGNOSTIC IMAGING OF BREAST: ICD-10-CM

## 2023-04-19 PROCEDURE — 76642 ULTRASOUND BREAST LIMITED: CPT | Mod: RT

## 2023-04-19 PROCEDURE — 76642 ULTRASOUND BREAST LIMITED: CPT | Mod: 26,RT

## 2023-04-20 DIAGNOSIS — R92.8 OTHER ABNORMAL AND INCONCLUSIVE FINDINGS ON DIAGNOSTIC IMAGING OF BREAST: ICD-10-CM

## 2023-04-25 ENCOUNTER — NON-APPOINTMENT (OUTPATIENT)
Age: 73
End: 2023-04-25

## 2023-05-04 ENCOUNTER — OUTPATIENT (OUTPATIENT)
Dept: OUTPATIENT SERVICES | Facility: HOSPITAL | Age: 73
LOS: 1 days | End: 2023-05-04
Payer: MEDICARE

## 2023-05-04 DIAGNOSIS — Z90.89 ACQUIRED ABSENCE OF OTHER ORGANS: Chronic | ICD-10-CM

## 2023-05-04 DIAGNOSIS — R93.3 ABNORMAL FINDINGS ON DIAGNOSTIC IMAGING OF OTHER PARTS OF DIGESTIVE TRACT: Chronic | ICD-10-CM

## 2023-05-04 DIAGNOSIS — R92.1 MAMMOGRAPHIC CALCIFICATION FOUND ON DIAGNOSTIC IMAGING OF BREAST: Chronic | ICD-10-CM

## 2023-05-04 DIAGNOSIS — Z98.890 OTHER SPECIFIED POSTPROCEDURAL STATES: Chronic | ICD-10-CM

## 2023-05-04 DIAGNOSIS — R91.8 OTHER NONSPECIFIC ABNORMAL FINDING OF LUNG FIELD: ICD-10-CM

## 2023-05-04 DIAGNOSIS — Z87.2 PERSONAL HISTORY OF DISEASES OF THE SKIN AND SUBCUTANEOUS TISSUE: Chronic | ICD-10-CM

## 2023-05-04 DIAGNOSIS — Z90.49 ACQUIRED ABSENCE OF OTHER SPECIFIED PARTS OF DIGESTIVE TRACT: Chronic | ICD-10-CM

## 2023-05-04 PROCEDURE — 71046 X-RAY EXAM CHEST 2 VIEWS: CPT | Mod: 26

## 2023-05-04 PROCEDURE — 71046 X-RAY EXAM CHEST 2 VIEWS: CPT

## 2023-05-05 DIAGNOSIS — R91.8 OTHER NONSPECIFIC ABNORMAL FINDING OF LUNG FIELD: ICD-10-CM

## 2023-08-14 ENCOUNTER — LABORATORY RESULT (OUTPATIENT)
Age: 73
End: 2023-08-14

## 2023-08-14 ENCOUNTER — APPOINTMENT (OUTPATIENT)
Dept: HEMATOLOGY ONCOLOGY | Facility: CLINIC | Age: 73
End: 2023-08-14
Payer: MEDICARE

## 2023-08-14 ENCOUNTER — OUTPATIENT (OUTPATIENT)
Dept: OUTPATIENT SERVICES | Facility: HOSPITAL | Age: 73
LOS: 1 days | End: 2023-08-14
Payer: MEDICARE

## 2023-08-14 VITALS
OXYGEN SATURATION: 99 % | BODY MASS INDEX: 26.21 KG/M2 | RESPIRATION RATE: 18 BRPM | HEART RATE: 76 BPM | TEMPERATURE: 98.6 F | SYSTOLIC BLOOD PRESSURE: 127 MMHG | WEIGHT: 167 LBS | DIASTOLIC BLOOD PRESSURE: 79 MMHG | HEIGHT: 67 IN

## 2023-08-14 DIAGNOSIS — C50.912 MALIGNANT NEOPLASM OF UNSPECIFIED SITE OF LEFT FEMALE BREAST: ICD-10-CM

## 2023-08-14 DIAGNOSIS — Z90.49 ACQUIRED ABSENCE OF OTHER SPECIFIED PARTS OF DIGESTIVE TRACT: Chronic | ICD-10-CM

## 2023-08-14 DIAGNOSIS — Z98.890 OTHER SPECIFIED POSTPROCEDURAL STATES: Chronic | ICD-10-CM

## 2023-08-14 DIAGNOSIS — B36.9 SUPERFICIAL MYCOSIS, UNSPECIFIED: ICD-10-CM

## 2023-08-14 DIAGNOSIS — Z90.89 ACQUIRED ABSENCE OF OTHER ORGANS: Chronic | ICD-10-CM

## 2023-08-14 DIAGNOSIS — Z87.2 PERSONAL HISTORY OF DISEASES OF THE SKIN AND SUBCUTANEOUS TISSUE: Chronic | ICD-10-CM

## 2023-08-14 DIAGNOSIS — R92.1 MAMMOGRAPHIC CALCIFICATION FOUND ON DIAGNOSTIC IMAGING OF BREAST: Chronic | ICD-10-CM

## 2023-08-14 DIAGNOSIS — R93.3 ABNORMAL FINDINGS ON DIAGNOSTIC IMAGING OF OTHER PARTS OF DIGESTIVE TRACT: Chronic | ICD-10-CM

## 2023-08-14 PROCEDURE — 82378 CARCINOEMBRYONIC ANTIGEN: CPT

## 2023-08-14 PROCEDURE — 80048 BASIC METABOLIC PNL TOTAL CA: CPT

## 2023-08-14 PROCEDURE — 86300 IMMUNOASSAY TUMOR CA 15-3: CPT

## 2023-08-14 PROCEDURE — 99213 OFFICE O/P EST LOW 20 MIN: CPT

## 2023-08-14 PROCEDURE — 85027 COMPLETE CBC AUTOMATED: CPT

## 2023-08-14 PROCEDURE — 80076 HEPATIC FUNCTION PANEL: CPT

## 2023-08-14 RX ORDER — CLOTRIMAZOLE 10 MG/G
1 CREAM TOPICAL TWICE DAILY
Qty: 1 | Refills: 0 | Status: ACTIVE | COMMUNITY
Start: 2023-08-14 | End: 1900-01-01

## 2023-08-14 NOTE — END OF VISIT
[FreeTextEntry3] : I was physically present for the key portions of the evaluation and management service provided.  I agree with the history and physical, and plan which I have reviewed and edited where appropriate.

## 2023-08-14 NOTE — PHYSICAL EXAM
[Restricted in physically strenuous activity but ambulatory and able to carry out work of a light or sedentary nature] : Status 1- Restricted in physically strenuous activity but ambulatory and able to carry out work of a light or sedentary nature, e.g., light house work, office work [Normal] : affect appropriate [de-identified] : Left breast lumpectomy surgical incision healed well. No tenderness. left SLNB scar healed well. Fungal rash under left breast

## 2023-08-14 NOTE — HISTORY OF PRESENT ILLNESS
[de-identified] : 66 yo female is here for f/u visit and chemotherapy. She was recently diagnosed left breast cancer. She had screening mammogram in 06/2015 and it was normal. In Jan 2018 she noticed a lump in left breast. A diagnostic mammo on 1/18/2018 showed a  2.1  cm  mass In  the  region  of  patient's  palpable  abnormality.  No  other  suspicious  masses,  areas of  architectural  distortion  or  cluster  of  microcalcifications  in  either  breast.  Evaluation  of  the  left  axilla  demonstrates  normal-appearing  lymph  nodes.\par    \par  Targeted  Left  Breast  Sonogram showed  \par  In  the  region  of  the  patient's  palpable  abnormality  at  the  12:00  location  3  cm  from  the  nipple,  there  is  an  irregular  hypoechoic  mass  measuring  5.2  cm  x  3.2  cm  x  1.2  cm  which  corresponds  to  mammographic  findings. \par  Other  masses  also  identified  within  the  left  breast  and  are  as  follows: 6:00  location  2  cm  from  the  nipple  measuring  4.1  cm  x  2.4  cm  x  1.4  cm,  9-10  o'clock  location  4  cm  from  the  nipple  measuring  2.1  cm  1.4  cm  x  0.4  cm  and  11:00  location  5  to  6  cm  from  the  nipple  measuring  1.3  cm  x  2.1  cm  x  0.4  cm.   An  ultrasound-guided  core  biopsy  of  the  each  mass  is  recommended\par  \par  She then had USG guided biopsies of those lesions. Pathology showed \par  Site  A:  Left  breast,  11:00  N5-6. \par  Intraductal  papilloma  containing  florid  duct  hyperplasia. \par    \par  Site  B:  Left  breast  9-10  o'clock  N4. \par  -Complex  sclerosing  lesion  (radial  scar)  containing  focal  florid  duct  hyperplasia.  \par  -Atrophic  breast  tissue  associated  with  coarse  stromal  calcifications. \par   Site  C:  Left  Breast  6:00  N2 \par  -Benign  breast  tissue  with  proliferative  type  fibrocystic  changes  including  dominant  dense  stromal  fibrosis,  focal  florid  duct  hyperplasia,  sclerosing  adenosis,  macrocyst  wall  fragments,  columnar  cell  change/hyperplasia  without  atypia,  and  rare  microcalcifications  present  in  benign  ducts. \par    \par  Site  D:  Left  breast  12:00  N3 \par  -Invasive  poorly  differentiated  duct  carcinoma  with  focal  necrosis, ER negative, CO <1%, Her-2 positive 3+ by IHC and Ki 67 50%.\par    \par  Recommendation:  Surgical  excision.  Surgical  and  oncologic  follow-up  for  the  biopsy-proven  12:00  carcinoma  is  recommended.    Surgical  planning  for  excision  of  the  11:00  and  9-10  o'clock  high  risk  lesions  also  recommended\par  \par  On 2/13/2018, PET/CT  showed abnormal increased uptake is seen in multiple areas in the left breast. Max SUV 13.92 in the superior breast mass near surgical clip findings are \par  consistent with biopsy proven malignancy in the superior mass. Remaining of the abnormal areas of increased uptake could be malignancy and/or postinflammatory nature. Correlate the findings with the biopsy reports.  There are PET positive left axillary lymph nodes with a max SUV 2.56. Findings could be postinflammatory and/or malignancy. \par  No other areas of abnormal increased uptake is seen to suggest biologic tumor activity. \par  \par  On 2/15/18,  MRI breast was done. the report is still pending.\par  \par  The patient saw Dr. Hampton for surgical consultation. In light of large Her-2 positive tumor, she was given an option to have neoadjuvant chemotherapy followed by surgery. Monica is here with her sister for consultation of neoadjuvant systemic therapy. [de-identified] : Monica has completed neoadjuvant chemotherapy with TCHP x 6 times.  She responded well and her left breast mass has reduced significantly. She complains fatigue,  She experienced side effects from the chemotherapy including diarrhea, poor appetite and insomnia. These symptoms have got better since chemo completed.  On 7/27/18, she had PET/CT which showed No pathologic FDG uptake to suggest definite biologic tumor activity. All previously seen pathologic FDG uptake in the left breast has resolved. Previously seen mild FDG uptake in left axillary nodes has resolved. Findings are consistent with good treatment response. Mildly FDG avid portacaval lymph node, SUV max 5.7, is nonspecific but less likely to represent biologic tumor activity. This can be reassessed on follow-up.   On 7/23/18, she had b/l breast MRI which showed Interval decrease in size of left breast 12:00 axis biopsy-proven invasive ductal carcinoma; currently it measures up to 1.0 cm, previously measuring up to 3.2 cm.   9/7/18: The patient is here today for followup and treatment. She underwent NLOC left lumpectomy and SLNB on 8/24/18. The pathology showed no evidence of residual disease and 2 SLN negative. She recovered well from surgery. She still has loose bowel movement sometimes. her appetite is improving.  10/19/2018 Patient is here for a follow up prior to getting treatment with Herceptin and Perjeta. Her lumpectomy scars have healed up well and she has some mild tenderness. It assured that it will get better. She was concerned about irregularity at incision site and again we discussed the mammography findings which showed post lumpectomy changes ( 10/2018) and that it is scar tissue.  She had mapping done for radiation, yet to start though.  Her loose bowel movements have improved as well.  She offers no other complaints.  12/28/18: The patient is here for followup visit. She completed radiation in 11/2018. She feels well and does not have new complains. In 10/2018, she had b/l dx mammo. There was no suspicious finding. In 11/2018, she had 2D echo. Her LVEF was 50-55%.  2/8/19: Pt is here for a follow-up visit. She is doing well on herceptin and perjeta. Last echo from Nov 2018 showed EF of 55-65%. No fresh complaints. Last US from Jan 2019 showed: Oval, hypoechoic, circumscribed, parallel mass in the right breast, 4:00  axis, 2 to 3 cm from the nipple measures 0.9 x 0.4 x 1.1 cm. This is not  significantly changed in size from 2/26/2018 where it measured 1.0 x 0.4  x 1.1 cm, and was previously biopsy proven to be benign.   Additional oval, circumscribed, hypoechoic mass in the right breast, 2:00  axis, 7 cm from the nipple measures 1.7 x 1.2 x 0.5 cm. This was likely  present on the prior MRI of 2/15/2018 (series 8, image 49) and is also  likely benign. Continued sonographic follow-up is recommended.   Benign post lumpectomy changes are seen in the left breast, 12:00 axis.  No suspicious solid or cystic masses are seen in the left breast.  4/12/19: The patient is here for f/u visit. She complains RUQ pain for a few weeks. She saw Dr. Manzo and had abdominal sonogram which showed Cholelithiasis without sonographic evidence of acute cholecystitis. CBD dilatation (11 mm), new since March 2016. Recommend correlation with laboratory tests. If indicated, MRCP can be obtained to exclude CBD  obstruction or stone. She has been taking Cipro and Flagyl. She did not have any fever. She is given a referral for MRCP but she has not made an appointment yet. She has b/l dx mammo on 4/219. There was no suspicious finding.  7/26/2019:  The patient is here for follow up. Since last visit , she had a cholecystectomy which showed chronic cholecystitis and cholelithiasis. Her bilirubin and liver enzymes normalized after surgery. She is also switched to venlafaxine for her anxiety. She is feeling much better. She is trying to exercise and regain energy. She complains left axillary pain at the site of SNLBx . She is also concerned about multiple lesions of her breasts that were proven to be benign in the past. She is due for a mammogram in October 2019. The patient also reported palpitations when she gets up quickly from a seating or a sleeping position , and bilateral ankle swelling by the end of the day . She will see her PMD to adjust her medications.   11/11/19 Patient is here today for follow up visit.  She had a mammogram 10/2019 which showed calcification in left breast and a radial sclerosing lesion on biopsy of right breast.  She will have a lumpectomy on right breast for this high risk benign lesion and port removal on same day-12/11/19 by Dr. Hampton.  She offers no further complaints.  5/4/2020: Patient is evaluated via telehealth. She has IDC of the left breast, ER/[place] negative and Her-2 positive, with large tumor mass and FDG avid left axillary adenopathy, clinical stage T3N1, s/p neoadjuvant chemotherapy followed by left lumpectomy and SLNBin 8/2018, achieved pCR - followed by  maintenance herceptin and Perjeta. She received adjuvant breast radiotherapy. She had a mammogram 10/2019 which showed calcification in left breast and a radial sclerosing lesion on biopsy of right breast. 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 She recovered well from surgery. She does not have any new complains.  8/3/2020 70 yo female is here today for follow up visit. She was diagnosed with ER/NY neg and her-2 pos IDC of the left breast, s/p neoadjuvant chemotherapy with TCHP x 6 followed by by left lumpectomy and SLNB in 8/2018, achieved pCR, s/p adjuvant WBI and completed maintenance Herceptin and Perjeta, remains KASHMIR.  In 12/2019 she had right lumpectomy and was found to have focal atypical lobular hyperplasia (ALH), and proliferative type fibrocystic changes associated with calcifications from a previous biopsy site. Primary prevention with risk reduction agent was discussed with her previously. She was reluctant.  Today, she c/o worsening pain to her left shoulder area x few months.  No trauma to site reported.   Last mammogram / breast US were done 4/2020 which showed no mammographic or sonographic evidence of malignancy. Post lumpectomy changes of the left breast. Postsurgical distortion of the right breast.  Pt requests for genetic testing to be done today.  Family history includes;  mother- pancreatic ca at 62 yo, maternal aunt- breast cancer at 70's yo, paternal aunt- breast cancer at 70's yo, brother- prostate cancer at 62 yo, cousin- bone cancer at 10 yo.  01/25/2021: MONICA is here for follow up visit for ER/[place] neg and her-2 pos IDC of the left breast, s/p neoadjuvant chemotherapy with TCHP x 6 followed by by left lumpectomy and SLNB in 8/2018, achieved pCR, s/p adjuvant WBI and completed maintenance Herceptin and Perjeta, remains KASHMIR.  In 12/2019 she had right lumpectomy and was found to have focal atypical lobular hyperplasia (ALH), and proliferative type fibrocystic changes associated with calcifications from a previous biopsy site. Primary prevention with risk reduction agent was discussed with her previously. She was reluctant and opted to continue on surveillance. Pain of the left shoulder resolved: xray showed No evidence of acute osseous abnormalities. No sclerotic or lytic bone lesion. Mild AC joint and mild glenohumeral joint degenerative changes. Last diagnostic mammogram and US of left breast completed in Oct 2020 showed stable benign post lumpectomy and postradiation changes in the left breast and no significant change to suggest malignancy. She denies any new breast symptoms at this time. Labs reviewed from [facility].  7/29/21: MONICA is here for follow up visit for ER/[place] neg and her-2 pos IDC of the left breast, s/p neoadjuvant chemotherapy with TCHP x 6 followed by by left lumpectomy and SLNB in 8/2018, achieved pCR, s/p adjuvant WBI and completed maintenance Herceptin and Perjeta, remains KASHMIR.  In 12/2019 she had right lumpectomy and was found to have focal atypical lobular hyperplasia (ALH), and proliferative type fibrocystic changes associated with calcifications from a previous biopsy site. She opted not to take risk reduction agent. B/L Breast Mammogram  from 4/22/21 showed no mammographic evidence of malignancy. Today patient reports feeling well. She complains pain in her left breast sometimes.    01/26/2022: MONICA is here for follow up visit for ER/NY neg and her-2 pos IDC of the left breast, s/p neoadjuvant chemotherapy with TCHP x 6 followed by by left lumpectomy and SLNB in 8/2018, achieved pCR, s/p adjuvant WBI and completed maintenance Herceptin and Perjeta, remains KASHMIR.  In 12/2019 she had right lumpectomy and was found to have focal atypical lobular hyperplasia (ALH), and proliferative type fibrocystic changes associated with calcifications from a previous biopsy site. She opted not to take risk reduction agent. B/L Breast Mammogram  from 4/22/21 showed no mammographic evidence of malignancy; she will repeat imaging in April. she denies any new breast changes. She continues to have intermittent nerve pain to the surgical bed.   8/15/22: MONICA is here for follow up visit for ER/[place] neg and her-2 pos IDC of the left breast diagnosed in 2018, s/p neoadjuvant chemotherapy with TCHP x 6 followed by by left lumpectomy and SLNB in 8/2018, achieved pCR, s/p adjuvant WBI and completed maintenance Herceptin and Perjeta, remains KASHMIR. In 12/2019 she had right lumpectomy and was found to have focal atypical lobular hyperplasia (ALH), and proliferative type fibrocystic changes associated with calcifications from a previous biopsy site. She opted not to take risk reduction agent. On 4/27/22, B/L Breast Mammogram showed an area of architectural distortion at the site of lumpectomy seen in the left breast and an area of benign architectural distortion corresponding to the site of surgery seen in the right breast. b/l breast US on 5/2/22 showed -Right breast at the 11:00 location periareolar region, there appears to be a single duct with debris measuring 3.0 cm x 1.0 cm x 1.9 cm and 6:00 location 2 cm from the nipple of the left breast measuring 3.5 cm x 0.5 cm x 1.7 cm.  An ultrasound-guided core biopsy of each area is recommended. On 5/4/22, she had US guided biopsies. The left breast mass biopsy showed benign atrophic breast tissue. Left breast mass biopsy showed Intraductal papilloma and benign atrophic breast tissue with proliferative type fibrocystic. On 7/25/22, she had right retroareolar mass, radiofrequency seed localized lumpectomy which revealed Intraductal papilloma containing florid duct hyperplasia located and separate radial sclerosing lesion (radial scar). She recovered well from surgery. She complains right hip pain for a few weeks.  2/13/22: MONICA is here for follow up visit for ER/NY neg and her-2 pos IDC of the left breast diagnosed in 2018, s/p neoadjuvant chemotherapy with TCHP x 6 followed by by left lumpectomy and SLNB in 8/2018, achieved pCR, s/p adjuvant WBI and completed maintenance Herceptin and Perjeta, remains KASHMIR. In 12/2019 she had right lumpectomy and was found to have focal atypical lobular hyperplasia (ALH), and proliferative type fibrocystic changes associated with calcifications from a previous biopsy site. She opted not to take risk reduction agent. On 4/27/22, B/L Breast Mammogram showed an area of architectural distortion at the site of lumpectomy seen in the left breast and an area of benign architectural distortion corresponding to the site of surgery seen in the right breast. b/l breast US on 5/2/22 showed -Right breast at the 11:00 location periareolar region, there appears to be a single duct with debris measuring 3.0 cm x 1.0 cm x 1.9 cm and 6:00 location 2 cm from the nipple of the left breast measuring 3.5 cm x 0.5 cm x 1.7 cm.  An ultrasound-guided core biopsy of each area is recommended. On 5/4/22, she had US guided biopsies. The left breast mass biopsy showed benign atrophic breast tissue. Left breast mass biopsy showed Intraductal papilloma and benign atrophic breast tissue with proliferative type fibrocystic. On 7/25/22, she had right retroareolar mass, radiofrequency seed localized lumpectomy which revealed Intraductal papilloma containing florid duct hyperplasia located and separate radial sclerosing lesion (radial scar). She recovered well from surgery. No complaint to offer at this time.  8/14/23 MONICA is here for follow up visit for ER/[place] neg and her-2 pos IDC of the left breast diagnosed in 2018, s/p neoadjuvant chemotherapy with TCHP x 6 followed by by left lumpectomy and SLNB in 8/2018, achieved pCR, s/p adjuvant WBI and completed maintenance Herceptin and Perjeta, remains KASHMIR. In 12/2019 she had right lumpectomy and was found to have focal atypical lobular hyperplasia (ALH), and proliferative type fibrocystic changes associated with calcifications from a previous biopsy site. She opted not to take risk reduction agent. On 4/27/22, B/L Breast Mammogram showed an area of architectural distortion at the site of lumpectomy seen in the left breast and an area of benign architectural distortion corresponding to the site of surgery seen in the right breast. b/l breast US on 5/2/22 showed -Right breast at the 11:00 location periareolar region, there appears to be a single duct with debris measuring 3.0 cm x 1.0 cm x 1.9 cm and 6:00 location 2 cm from the nipple of the left breast measuring 3.5 cm x 0.5 cm x 1.7 cm.  An ultrasound-guided core biopsy of each area is recommended. On 5/4/22, she had US guided biopsies. The left breast mass biopsy showed benign atrophic breast tissue. Left breast mass biopsy showed Intraductal papilloma and benign atrophic breast tissue with proliferative type fibrocystic. On 7/25/22, she had right retroareolar mass, radiofrequency seed localized lumpectomy which revealed Intraductal papilloma containing florid duct hyperplasia located and separate radial sclerosing lesion (radial scar). In April, she had right axilla pain and US was performed was negative. No complaint to offer at this time.

## 2023-08-14 NOTE — ASSESSMENT
[FreeTextEntry1] : 1. 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 2. Right breast ALH, sclerosing papilloma and proliferative type fibrocystic changes, s/p lumpectomy. 3. Mild hypercalcemia, resolved now  Assessment and Plan: -- Breast imaging and biopsies reviewed. B/L Dx mammogram done on 12/30/22 shows stable benign nodularity in both breasts w/ no evidence of malignancy.  -- Breast exam today did not reveal palpable abnormality. She is scheduled for b/l dx mammo and US in 12/2023 w/ breast surgery team. -- Order blood work for CBC, CMP and tumor markers. -- Fungal rash under left breast- will rx lotrimin cream.  -- RTO for followup visit in 6 months.  Case was seen and discussed with Dr. Lynne who agreed with the assessment and plan.

## 2023-12-13 ENCOUNTER — OUTPATIENT (OUTPATIENT)
Dept: OUTPATIENT SERVICES | Facility: HOSPITAL | Age: 73
LOS: 1 days | End: 2023-12-13
Payer: MEDICARE

## 2023-12-13 DIAGNOSIS — R92.1 MAMMOGRAPHIC CALCIFICATION FOUND ON DIAGNOSTIC IMAGING OF BREAST: Chronic | ICD-10-CM

## 2023-12-13 DIAGNOSIS — Z98.890 OTHER SPECIFIED POSTPROCEDURAL STATES: Chronic | ICD-10-CM

## 2023-12-13 DIAGNOSIS — Z90.89 ACQUIRED ABSENCE OF OTHER ORGANS: Chronic | ICD-10-CM

## 2023-12-13 DIAGNOSIS — Z90.49 ACQUIRED ABSENCE OF OTHER SPECIFIED PARTS OF DIGESTIVE TRACT: Chronic | ICD-10-CM

## 2023-12-13 DIAGNOSIS — M25.561 PAIN IN RIGHT KNEE: ICD-10-CM

## 2023-12-13 DIAGNOSIS — R93.3 ABNORMAL FINDINGS ON DIAGNOSTIC IMAGING OF OTHER PARTS OF DIGESTIVE TRACT: Chronic | ICD-10-CM

## 2023-12-13 DIAGNOSIS — Z87.2 PERSONAL HISTORY OF DISEASES OF THE SKIN AND SUBCUTANEOUS TISSUE: Chronic | ICD-10-CM

## 2023-12-13 PROCEDURE — 73562 X-RAY EXAM OF KNEE 3: CPT | Mod: RT

## 2023-12-13 PROCEDURE — 73562 X-RAY EXAM OF KNEE 3: CPT | Mod: 26,RT

## 2023-12-14 DIAGNOSIS — M25.561 PAIN IN RIGHT KNEE: ICD-10-CM

## 2023-12-20 ENCOUNTER — RESULT REVIEW (OUTPATIENT)
Age: 73
End: 2023-12-20

## 2023-12-20 ENCOUNTER — OUTPATIENT (OUTPATIENT)
Dept: OUTPATIENT SERVICES | Facility: HOSPITAL | Age: 73
LOS: 1 days | End: 2023-12-20
Payer: MEDICARE

## 2023-12-20 DIAGNOSIS — Z98.890 OTHER SPECIFIED POSTPROCEDURAL STATES: Chronic | ICD-10-CM

## 2023-12-20 DIAGNOSIS — Z90.49 ACQUIRED ABSENCE OF OTHER SPECIFIED PARTS OF DIGESTIVE TRACT: Chronic | ICD-10-CM

## 2023-12-20 DIAGNOSIS — Z90.89 ACQUIRED ABSENCE OF OTHER ORGANS: Chronic | ICD-10-CM

## 2023-12-20 DIAGNOSIS — Z12.31 ENCOUNTER FOR SCREENING MAMMOGRAM FOR MALIGNANT NEOPLASM OF BREAST: ICD-10-CM

## 2023-12-20 DIAGNOSIS — R92.2 INCONCLUSIVE MAMMOGRAM: ICD-10-CM

## 2023-12-20 DIAGNOSIS — Z87.2 PERSONAL HISTORY OF DISEASES OF THE SKIN AND SUBCUTANEOUS TISSUE: Chronic | ICD-10-CM

## 2023-12-20 DIAGNOSIS — R92.1 MAMMOGRAPHIC CALCIFICATION FOUND ON DIAGNOSTIC IMAGING OF BREAST: Chronic | ICD-10-CM

## 2023-12-20 DIAGNOSIS — R93.3 ABNORMAL FINDINGS ON DIAGNOSTIC IMAGING OF OTHER PARTS OF DIGESTIVE TRACT: Chronic | ICD-10-CM

## 2023-12-20 PROCEDURE — 76641 ULTRASOUND BREAST COMPLETE: CPT | Mod: 50

## 2023-12-20 PROCEDURE — 77063 BREAST TOMOSYNTHESIS BI: CPT | Mod: 26

## 2023-12-20 PROCEDURE — 77067 SCR MAMMO BI INCL CAD: CPT

## 2023-12-20 PROCEDURE — 77067 SCR MAMMO BI INCL CAD: CPT | Mod: 26

## 2023-12-20 PROCEDURE — 77063 BREAST TOMOSYNTHESIS BI: CPT

## 2023-12-20 PROCEDURE — 76641 ULTRASOUND BREAST COMPLETE: CPT | Mod: 26,50

## 2023-12-21 DIAGNOSIS — R92.2 INCONCLUSIVE MAMMOGRAM: ICD-10-CM

## 2023-12-21 DIAGNOSIS — Z12.31 ENCOUNTER FOR SCREENING MAMMOGRAM FOR MALIGNANT NEOPLASM OF BREAST: ICD-10-CM

## 2024-01-04 ENCOUNTER — APPOINTMENT (OUTPATIENT)
Dept: BREAST CENTER | Facility: CLINIC | Age: 74
End: 2024-01-04
Payer: MEDICARE

## 2024-01-04 VITALS
SYSTOLIC BLOOD PRESSURE: 130 MMHG | DIASTOLIC BLOOD PRESSURE: 83 MMHG | WEIGHT: 163 LBS | HEART RATE: 79 BPM | HEIGHT: 67 IN | BODY MASS INDEX: 25.58 KG/M2

## 2024-01-04 DIAGNOSIS — N64.89 OTHER SPECIFIED DISORDERS OF BREAST: ICD-10-CM

## 2024-01-04 DIAGNOSIS — Z51.11 ENCOUNTER FOR ANTINEOPLASTIC CHEMOTHERAPY: ICD-10-CM

## 2024-01-04 DIAGNOSIS — D24.9 BENIGN NEOPLASM OF UNSPECIFIED BREAST: ICD-10-CM

## 2024-01-04 PROCEDURE — 99212 OFFICE O/P EST SF 10 MIN: CPT

## 2024-01-04 NOTE — HISTORY OF PRESENT ILLNESS
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  Left complex sclerosing lesion (radial scar) on ultrasound guided core biopsy 1/25/18; 9-10:00 N4, 21 mm (cork).  Left benign breast tissue with proliferative type fibrocystic changes on ultrasound guided core biopsy 1/25/18; 6:00 N2, 24 mm (stoplight).  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 Estrogen receptor negative. Progesterone receptor <1% HER2 positive. Ki-67: 50%   Patient has history of breast calcifications and cysts, with a history of a benign left breast lumpectomy in 1990's.  Family history of breast cancer - paternal aunt 73 and maternal aunt 67.  Mother had pancreatic cancer.  Brother has prostate cancer.  No BRCA testing in family.    Status post insertion of a mediport on 3/5/18.   Status post re-placement of mediport 3/29/18 for neoadjuvant chemotherapy.  Completed Herceptin and Perjeta.    Status post Left NLOC of three areas and SLNB 8/24/18 demonstrating 0/2 (-); 3. Breast, left 12 o'clock mass, needle localized lumpectomy: Sclerosing large duct papilloma associated with florid duct hyperplasia and coarse stromal calcifications, radial sclerosing lesion (radial scar).  Diffuse histologic changes consistent with prior systemic therapy effect.  Focal healing prior biopsy site change with no residual carcinoma identified.  AJCC 8th Edition Pathologic Stage: ypT0, p(sn)N0, pMx, pCR (pathologic complete response to neoadjuvant therapy). Breast, left 11 o'clock mass, needle localized lumpectomy: Radial sclerosing lesion (radial scar) located adjacent to healing prior biopsy site changes. Breast, left 9 o'clock mass, needle localized lumpectomy: Sclerosing small duct papilloma containing florid duct hyperplasia with adjacent healing prior biopsy site changes.  s/p adjuvant RT w/ Dr. Kennedy Muñoz.  Left breast fragments of hyalinized fibroadenoma containing coarse stromal calcifications with adjacent benign atrophic fatty breast tissue on stereotactic core biopsy 10/28/19; Posterior (tophat).  Findings are benign concordant and 6-month followup left breast mammogram recommended.  Right breast radial sclerosing lesion on ultrasound guided core biopsy 10/28/19; 2:00 N2, 8 mm (top hat).  Surgical excision recommended.    Status post Right NLOC and removal of mediport 12/13/19 demonstrating large duct sclerosing papilloma containing focal florid duct hyperplasia and focal atypical lobular hyperplasia.    MONICA ALMONTE is a 71 year old female patient who presents today in follow up for Stage IIA left breast cancer. Since her last visit, she has no new breast related complaints.   Her most recent imaging: B/L Screening Mammo - 04/27/2022: -There are scattered areas of fibroglandular density. -There is an area of architectural distortion at the site of lumpectomy seen in the left breast. -There is an area of benign architectural distortion corresponding to the site of surgery seen in the right breast. -There is no mammographic evidence of malignancy. BI-RADS Category 2:  Benign  B/L Breast Sono - 04/27/2022: Right breast: -At the 2:00 position 7 cm from the nipple, there is a stable hypoechoic mass measuring 1.9 x 1.3 x 0.4 cm, benign. -At the 4:00 position 2 cm from the nipple, there is a previously biopsied benign mass measuring 1.1 x 0.3 x 0.8 cm. -At the 11:00 position periareolar region, there is a heterogeneous mass measuring 3.0 x 1.0 x 1.9 cm, indeterminate.  -No axillary adenopathy. Left breast: -At the 6:00 position 2 cm from the nipple (periareolar region), there is a heterogeneous mass measuring 3.5 x 0.5 x 1.8 cm. Real-time ultrasound is recommended. -Benign scar is noted at the lumpectomy site at the 12:00 position. -No axillary adenopathy. BI-RADS Category 0:  Incomplete: Needs Additional Imaging Evaluation  B/L Breast Sono - 05/02/2022: -Right breast at the 11:00 location periareolar region, there appears to be a single duct with debris measuring 3.0 cm x 1.0 cm x 1.9 cm -6:00 location 2 cm from the nipple of the left breast measuring 3.5 cm x 0.5 cm x 1.7 cm.  An ultrasound-guided core biopsy of each area is recommended. BI-RADS Category 4: Suspicious  The recommended biopsies have not yet been performed.  INTERVAL HISTORY 6/21/22 Monica is here to discuss her new dx of RIGHT breast Intraductal papilloma. She has no breast related complaints at this time.  She denies any breast pain, has not palpated any new palpable masses in either breast and denies any nipple discharge or retraction.  Her imaging is as follows: 05/04/2022 Site 1, left breast US guided bx:(mini-cork)  -Benign atrophic breast tissue with dominant paucicellular stromal fibrosis and rare microcalcifications present in benign ductules. Site 2, right breast US guided bx:(stop-light)  -Intraductal papilloma. -Benign atrophic breast tissue with proliferative type fibrocystic changes associated with microcalcifications.  08/05/2022-- MONICA ALMONTE is a 71 year old female patient who presents today Pt presents to the office for her initial post-operative visit. She is s/p Right WLE w/ RF loc for an intraductal papilloma - 07/25/2022. She is feeling well. She denies any fever / chills or erythema and / or drainage related to the incision. Her pain is well controlled, only complains of mild soreness of the area.  Her final surgical pathology revealed intraductal papilloma located adjacent to healing prior biopsy site changes and separate radial sclerosing lesion (radial scar).   INTERVAL HISTORY 2/6/23 Monica is here for her six months follow up visit  She has no breast related complaints at this time.  She denies any breast pain, has not palpated any new palpable masses in either breast and denies any nipple discharge or retraction.  Her imaging is as follows: 12/19/2022 b/l dx mammo and US - scattered areas of fibroglandular density. - patient is status post a left lumpectomy and right breast excisional biopsy with stable architectural distortion most consistent with postsurgical change.    RIGHT US: -@2N 7 stable benign  oval circumscribed hypoechoic mass measuring 1.9 cm x 1.3 cm x 0.4 cm as well as a stable benign previously biopsied mass at the 4:00 location 2 to 3 cm from the nipple measuring 1.1 cm x 0.3 cm x 0.8 cm and stable previously biopsied benign heterogeneous mass in the left breast at the 6:00 location 2 cm from the nipple measuring 3.0 cm x 0.5 cm x 0.4 cm. BIRADS2   INTERVAL HISTORY 1/4/24 Monica is here for follow up visit.  She has no breast related complaints at this time.  She denies any breast pain, has not palpated any new palpable masses in either breast and denies any nipple discharge or retraction.   s/p neoadjuvant chemotherapy with TCHP x 6 followed by by left lumpectomy and SLNB in 8/2018  s/p adjuvant WBI and completed maintenance Herceptin and Perjeta  Her imaging is as follows: 12/20/2023 b/l mammo and US-->birads2 -scattered areas of fibroglandular density. -an area of architectural distortion at the site of lumpectomy seen in the left breast.  -an area of benign architectural distortion corresponding to the site of surgery seen in the right breast. - stable nodular asymmetries seen in both breasts.  RIGHT US: - 2N 5  a stable hypoechoic solid circumscribed parallel mass that measures 19 x 13 x 4 mm. - 4N 2  a stable hypoechoic solid mass that measures 10 x 8 x 3 mm. This was previously biopsied and demonstrated to be benign.  LEFT US: -scar is identified at the 12N2  is without significant change.

## 2024-01-04 NOTE — ASSESSMENT
[FreeTextEntry1] : ALEK is a akbar 73 year old patient who presented today in follow up for a history of Stage IIA left breast cancer. With dx of right breast intraductal papilloma s/p Right WLE w/ RF loc for an intraductal papilloma - 07/25/2022.   Her imaging is as follows: 12/20/2023 b/l mammo and US-->birads2 scattered areas of fibroglandular density. an area of architectural distortion at the site of lumpectomy seen in the left breast.  an area of benign architectural distortion corresponding to the site of surgery seen in the right breast.  stable nodular asymmetries seen in both breasts.  RIGHT US: - 2N 5  a stable hypoechoic solid circumscribed parallel mass that measures 19 x 13 x 4 mm. - 4N 2  a stable hypoechoic solid mass that measures 10 x 8 x 3 mm. This was previously biopsied and demonstrated to be benign.  LEFT US: -scar is identified at the 12N2  is without significant change.    PLAN: -b/l Mammo -12/21/24 -Follow-up after. -continue med onc follow up

## 2024-01-04 NOTE — PHYSICAL EXAM
[Normocephalic] : normocephalic [Atraumatic] : atraumatic [EOMI] : extra ocular movement intact [Examined in the supine and seated position] : examined in the supine and seated position [Symmetrical] : symmetrical [No dominant masses] : no dominant masses in right breast  [No dominant masses] : no dominant masses left breast [No Nipple Retraction] : no left nipple retraction [No Nipple Discharge] : no left nipple discharge [No Axillary Lymphadenopathy] : no left axillary lymphadenopathy [No Rashes] : no rashes [No Ulceration] : no ulceration

## 2024-01-04 NOTE — DATA REVIEWED
[FreeTextEntry1] : [id number]     EXAM:  MG MAMMO SCREEN W ZACH BI#    12/20/2023 INTERPRETATION:  HISTORY: Bilateral MG MAMMO SCREEN W ZACH BI# was performed. Patient is 73 years old and is seen for screening. The patient has a history of Ultrasound guided core biopsy procedure revealed invasive ductal carcinoma - NOS in the left breast in January, 2018. The patient has a history of left excision more than 10 years ago - benign - no visible scar and cyst aspiration at an unknown age - benign. The patient has the following family history of breast cancer:  maternal aunt, breast cancer.  RISK ASSESSMENT: Tyrer-Cuzick Lifetime Risk: 15.5  CLINICAL BREAST EXAM: The patient reports their last clinical breast exam was performed within the past year.  COMPARISON STUDIES: The present examination has been compared to prior imaging studies performed at [facility] on 01/18/2018, 10/09/2018, 04/03/2019, 10/09/2019, 10/28/2019, 04/20/2020, 10/15/2020, 04/22/2021, 04/27/2022, 05/04/2022, 07/21/2022 and 12/19/2022.  MAMMOGRAM FINDINGS: Mammography was performed including the following views: bilateral craniocaudal with tomosynthesis, bilateral mediolateral oblique with tomosynthesis.  The examination includes digital synthetic 2D and digital tomosynthesis 3D images. Additional imaging analysis was performed using CAD (computer-aided detection) software.  There are scattered areas of fibroglandular density.  Finding 1:  There is an area of architectural distortion at the site of lumpectomy seen in the left breast.  Finding 2:  There is an area of benign architectural distortion corresponding to the site of surgery seen in the right breast.  Finding 3:  There are stable nodular asymmetries seen in both breasts.  No suspicious mass, grouping of calcifications, or other abnormality is identified.  There has been no significant change from prior examinations.  IMPRESSION: There is no mammographic evidence of malignancy.  RECOMMENDATION: Unless otherwise indicated by clinical findings, annual screening mammography recommended.  ASSESSMENT: BI-RADS Category 2:  Benign  C: [id number]     EXAM:  MG US BREAST COMPLETE BI     12/20/2023 INTERPRETATION:  Clinical History / Reason for exam: Dense breasts. The patient is status post left lumpectomy in 2018 followed by radiation and chemotherapy. Bilateral whole breast sonography was performed. Both breasts and axillae were examined in their entirety.  Comparison is made to prior breast ultrasounds dating back to 4/20/2020.  Findings: In the right breast 2:00, 5 cm from the nipple there is a stable hypoechoic solid circumscribed parallel mass that measures 19 x 13 x 4 mm. In the right breast 4:00 axis, 2 cm from the nipple there is a stable hypoechoic solid mass that measures 10 x 8 x 3 mm. This was previously biopsied and demonstrated to be benign. The patient's scar is identified at the 12:00 axis of the left breast, 2 cm from the nipple and is without significant change. Elsewhere in either breast no suspicious solid or cystic lesion is seen. Suspicious solid or cystic lesion is seen in either breast. The axillae are unremarkable.  Impression: No suspicious sonographic lesion identified.  Recommendation: Unless otherwise indicated by clinical findings, the patient should resume annual screening in one year.  Recommendation: Unless otherwise indicated by clinical findings, annual screening mammography recommended.  BI-RADS Category 2: Benign

## 2024-01-18 LAB
ALBUMIN SERPL ELPH-MCNC: 4.3 G/DL
ALBUMIN SERPL ELPH-MCNC: 4.4 G/DL
ALP BLD-CCNC: 52 U/L
ALP BLD-CCNC: 53 U/L
ALT SERPL-CCNC: 29 U/L
ALT SERPL-CCNC: 36 U/L
ANION GAP SERPL CALC-SCNC: 12 MMOL/L
ANION GAP SERPL CALC-SCNC: 8 MMOL/L
AST SERPL-CCNC: 22 U/L
AST SERPL-CCNC: 28 U/L
BILIRUB DIRECT SERPL-MCNC: <0.2 MG/DL
BILIRUB DIRECT SERPL-MCNC: <0.2 MG/DL
BILIRUB INDIRECT SERPL-MCNC: >0 MG/DL
BILIRUB INDIRECT SERPL-MCNC: >0.1 MG/DL
BILIRUB SERPL-MCNC: 0.2 MG/DL
BILIRUB SERPL-MCNC: 0.3 MG/DL
BUN SERPL-MCNC: 21 MG/DL
BUN SERPL-MCNC: 23 MG/DL
CALCIUM SERPL-MCNC: 10 MG/DL
CALCIUM SERPL-MCNC: 10.3 MG/DL
CALCIUM SERPL-MCNC: 9.8 MG/DL
CANCER AG15-3 SERPL-ACNC: 20.6 U/ML
CANCER AG15-3 SERPL-ACNC: 22.9 U/ML
CEA SERPL-MCNC: 1.1 NG/ML
CEA SERPL-MCNC: 1.5 NG/ML
CHLORIDE SERPL-SCNC: 102 MMOL/L
CHLORIDE SERPL-SCNC: 104 MMOL/L
CO2 SERPL-SCNC: 27 MMOL/L
CO2 SERPL-SCNC: 29 MMOL/L
CREAT SERPL-MCNC: 0.7 MG/DL
CREAT SERPL-MCNC: 0.9 MG/DL
EGFR: 68 ML/MIN/1.73M2
EGFR: 91 ML/MIN/1.73M2
GLUCOSE SERPL-MCNC: 111 MG/DL
GLUCOSE SERPL-MCNC: 97 MG/DL
HCT VFR BLD CALC: 42.1 %
HCT VFR BLD CALC: 42.6 %
HGB BLD-MCNC: 13.9 G/DL
HGB BLD-MCNC: 14.1 G/DL
MCHC RBC-ENTMCNC: 29.8 PG
MCHC RBC-ENTMCNC: 30.3 PG
MCHC RBC-ENTMCNC: 32.6 G/DL
MCHC RBC-ENTMCNC: 33.5 G/DL
MCV RBC AUTO: 90.5 FL
MCV RBC AUTO: 91.2 FL
PARATHYROID HORMONE INTACT: 42 PG/ML
PLATELET # BLD AUTO: 200 K/UL
PLATELET # BLD AUTO: 212 K/UL
PMV BLD: 10.1 FL
PMV BLD: 9.9 FL
POTASSIUM SERPL-SCNC: 4.4 MMOL/L
POTASSIUM SERPL-SCNC: 4.7 MMOL/L
PROT SERPL-MCNC: 6.8 G/DL
PROT SERPL-MCNC: 7 G/DL
PTH RELATED PROT SERPL-MCNC: <2 PMOL/L
RBC # BLD: 4.65 M/UL
RBC # BLD: 4.67 M/UL
RBC # FLD: 13 %
RBC # FLD: 13.2 %
SODIUM SERPL-SCNC: 141 MMOL/L
SODIUM SERPL-SCNC: 141 MMOL/L
WBC # FLD AUTO: 5.67 K/UL
WBC # FLD AUTO: 5.97 K/UL

## 2024-02-28 ENCOUNTER — OUTPATIENT (OUTPATIENT)
Dept: OUTPATIENT SERVICES | Facility: HOSPITAL | Age: 74
LOS: 1 days | End: 2024-02-28
Payer: MEDICARE

## 2024-02-28 ENCOUNTER — APPOINTMENT (OUTPATIENT)
Dept: HEMATOLOGY ONCOLOGY | Facility: CLINIC | Age: 74
End: 2024-02-28

## 2024-02-28 ENCOUNTER — APPOINTMENT (OUTPATIENT)
Dept: HEMATOLOGY ONCOLOGY | Facility: CLINIC | Age: 74
End: 2024-02-28
Payer: MEDICARE

## 2024-02-28 VITALS
BODY MASS INDEX: 25.43 KG/M2 | HEIGHT: 67 IN | DIASTOLIC BLOOD PRESSURE: 75 MMHG | TEMPERATURE: 97.6 F | SYSTOLIC BLOOD PRESSURE: 121 MMHG | HEART RATE: 112 BPM | WEIGHT: 162 LBS

## 2024-02-28 DIAGNOSIS — Z98.890 OTHER SPECIFIED POSTPROCEDURAL STATES: Chronic | ICD-10-CM

## 2024-02-28 DIAGNOSIS — R93.3 ABNORMAL FINDINGS ON DIAGNOSTIC IMAGING OF OTHER PARTS OF DIGESTIVE TRACT: Chronic | ICD-10-CM

## 2024-02-28 DIAGNOSIS — C50.912 MALIGNANT NEOPLASM OF UNSPECIFIED SITE OF LEFT FEMALE BREAST: ICD-10-CM

## 2024-02-28 DIAGNOSIS — Z90.89 ACQUIRED ABSENCE OF OTHER ORGANS: Chronic | ICD-10-CM

## 2024-02-28 DIAGNOSIS — Z17.1 MALIGNANT NEOPLASM OF UPPER-OUTER QUADRANT OF LEFT FEMALE BREAST: ICD-10-CM

## 2024-02-28 DIAGNOSIS — N60.91 UNSPECIFIED BENIGN MAMMARY DYSPLASIA OF RIGHT BREAST: ICD-10-CM

## 2024-02-28 DIAGNOSIS — Z87.2 PERSONAL HISTORY OF DISEASES OF THE SKIN AND SUBCUTANEOUS TISSUE: Chronic | ICD-10-CM

## 2024-02-28 DIAGNOSIS — R92.1 MAMMOGRAPHIC CALCIFICATION FOUND ON DIAGNOSTIC IMAGING OF BREAST: Chronic | ICD-10-CM

## 2024-02-28 DIAGNOSIS — Z90.49 ACQUIRED ABSENCE OF OTHER SPECIFIED PARTS OF DIGESTIVE TRACT: Chronic | ICD-10-CM

## 2024-02-28 DIAGNOSIS — C50.412 MALIGNANT NEOPLASM OF UPPER-OUTER QUADRANT OF LEFT FEMALE BREAST: ICD-10-CM

## 2024-02-28 LAB
BASOPHILS # BLD AUTO: 0.04 K/UL
BASOPHILS NFR BLD AUTO: 0.6 %
EOSINOPHIL # BLD AUTO: 0.31 K/UL
EOSINOPHIL NFR BLD AUTO: 4.6 %
HCT VFR BLD CALC: 42.2 %
HGB BLD-MCNC: 14.2 G/DL
IMM GRANULOCYTES NFR BLD AUTO: 0.6 %
LYMPHOCYTES # BLD AUTO: 1.32 K/UL
LYMPHOCYTES NFR BLD AUTO: 19.6 %
MAN DIFF?: NORMAL
MCHC RBC-ENTMCNC: 30.6 PG
MCHC RBC-ENTMCNC: 33.6 G/DL
MCV RBC AUTO: 90.9 FL
MONOCYTES # BLD AUTO: 0.52 K/UL
MONOCYTES NFR BLD AUTO: 7.7 %
NEUTROPHILS # BLD AUTO: 4.52 K/UL
NEUTROPHILS NFR BLD AUTO: 66.9 %
PLATELET # BLD AUTO: 263 K/UL
PMV BLD AUTO: 0 /100 WBCS
RBC # BLD: 4.64 M/UL
RBC # FLD: 13.1 %
WBC # FLD AUTO: 6.75 K/UL

## 2024-02-28 PROCEDURE — 82378 CARCINOEMBRYONIC ANTIGEN: CPT

## 2024-02-28 PROCEDURE — 85027 COMPLETE CBC AUTOMATED: CPT

## 2024-02-28 PROCEDURE — 86300 IMMUNOASSAY TUMOR CA 15-3: CPT

## 2024-02-28 PROCEDURE — 84443 ASSAY THYROID STIM HORMONE: CPT

## 2024-02-28 PROCEDURE — 84439 ASSAY OF FREE THYROXINE: CPT

## 2024-02-28 PROCEDURE — 80048 BASIC METABOLIC PNL TOTAL CA: CPT

## 2024-02-28 PROCEDURE — 99213 OFFICE O/P EST LOW 20 MIN: CPT

## 2024-02-28 PROCEDURE — 80076 HEPATIC FUNCTION PANEL: CPT

## 2024-02-28 NOTE — PHYSICAL EXAM
[Restricted in physically strenuous activity but ambulatory and able to carry out work of a light or sedentary nature] : Status 1- Restricted in physically strenuous activity but ambulatory and able to carry out work of a light or sedentary nature, e.g., light house work, office work [Normal] : affect appropriate [de-identified] : Left breast lumpectomy surgical incision healed well. No tenderness. left SLNB scar healed well. Fungal rash under left breast

## 2024-02-28 NOTE — HISTORY OF PRESENT ILLNESS
[de-identified] : 68 yo female is here for f/u visit and chemotherapy. She was recently diagnosed left breast cancer. She had screening mammogram in 06/2015 and it was normal. In Jan 2018 she noticed a lump in left breast. A diagnostic mammo on 1/18/2018 showed a  2.1  cm  mass In  the  region  of  patient's  palpable  abnormality.  No  other  suspicious  masses,  areas of  architectural  distortion  or  cluster  of  microcalcifications  in  either  breast.  Evaluation  of  the  left  axilla  demonstrates  normal-appearing  lymph  nodes.\par    \par  Targeted  Left  Breast  Sonogram showed  \par  In  the  region  of  the  patient's  palpable  abnormality  at  the  12:00  location  3  cm  from  the  nipple,  there  is  an  irregular  hypoechoic  mass  measuring  5.2  cm  x  3.2  cm  x  1.2  cm  which  corresponds  to  mammographic  findings. \par  Other  masses  also  identified  within  the  left  breast  and  are  as  follows: 6:00  location  2  cm  from  the  nipple  measuring  4.1  cm  x  2.4  cm  x  1.4  cm,  9-10  o'clock  location  4  cm  from  the  nipple  measuring  2.1  cm  1.4  cm  x  0.4  cm  and  11:00  location  5  to  6  cm  from  the  nipple  measuring  1.3  cm  x  2.1  cm  x  0.4  cm.   An  ultrasound-guided  core  biopsy  of  the  each  mass  is  recommended\par  \par  She then had USG guided biopsies of those lesions. Pathology showed \par  Site  A:  Left  breast,  11:00  N5-6. \par  Intraductal  papilloma  containing  florid  duct  hyperplasia. \par    \par  Site  B:  Left  breast  9-10  o'clock  N4. \par  -Complex  sclerosing  lesion  (radial  scar)  containing  focal  florid  duct  hyperplasia.  \par  -Atrophic  breast  tissue  associated  with  coarse  stromal  calcifications. \par   Site  C:  Left  Breast  6:00  N2 \par  -Benign  breast  tissue  with  proliferative  type  fibrocystic  changes  including  dominant  dense  stromal  fibrosis,  focal  florid  duct  hyperplasia,  sclerosing  adenosis,  macrocyst  wall  fragments,  columnar  cell  change/hyperplasia  without  atypia,  and  rare  microcalcifications  present  in  benign  ducts. 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  Surgical  and  oncologic  follow-up  for  the  biopsy-proven  12:00  carcinoma  is  recommended.    Surgical  planning  for  excision  of  the  11:00  and  9-10  o'clock  high  risk  lesions  also  recommended\par  \par  On 2/13/2018, PET/CT  showed abnormal increased uptake is seen in multiple areas in the left breast. Max SUV 13.92 in the superior breast mass near surgical clip findings are \par  consistent with biopsy proven malignancy in the superior mass. Remaining of the abnormal areas of increased uptake could be malignancy and/or postinflammatory nature. Correlate the findings with the biopsy reports.  There are PET positive left axillary lymph nodes with a max SUV 2.56. Findings could be postinflammatory and/or malignancy. \par  No other areas of abnormal increased uptake is seen to suggest biologic tumor activity. \par  \par  On 2/15/18,  MRI breast was done. the report is still pending.\par  \par  The patient saw Dr. Hampton for surgical consultation. In light of large Her-2 positive tumor, she was given an option to have neoadjuvant chemotherapy followed by surgery. Monica is here with her sister for consultation of neoadjuvant systemic therapy. [de-identified] : Monica has completed neoadjuvant chemotherapy with TCHP x 6 times.  She responded well and her left breast mass has reduced significantly. She complains fatigue,  She experienced side effects from the chemotherapy including diarrhea, poor appetite and insomnia. These symptoms have got better since chemo completed.  On 7/27/18, she had PET/CT which showed No pathologic FDG uptake to suggest definite biologic tumor activity. All previously seen pathologic FDG uptake in the left breast has resolved. Previously seen mild FDG uptake in left axillary nodes has resolved. Findings are consistent with good treatment response. Mildly FDG avid portacaval lymph node, SUV max 5.7, is nonspecific but less likely to represent biologic tumor activity. This can be reassessed on follow-up.   On 7/23/18, she had b/l breast MRI which showed Interval decrease in size of left breast 12:00 axis biopsy-proven invasive ductal carcinoma; currently it measures up to 1.0 cm, previously measuring up to 3.2 cm.   9/7/18: The patient is here today for followup and treatment. She underwent NLOC left lumpectomy and SLNB on 8/24/18. The pathology showed no evidence of residual disease and 2 SLN negative. She recovered well from surgery. She still has loose bowel movement sometimes. her appetite is improving.  10/19/2018 Patient is here for a follow up prior to getting treatment with Herceptin and Perjeta. Her lumpectomy scars have healed up well and she has some mild tenderness. It assured that it will get better. She was concerned about irregularity at incision site and again we discussed the mammography findings which showed post lumpectomy changes ( 10/2018) and that it is scar tissue.  She had mapping done for radiation, yet to start though.  Her loose bowel movements have improved as well.  She offers no other complaints.  12/28/18: The patient is here for followup visit. She completed radiation in 11/2018. She feels well and does not have new complains. In 10/2018, she had b/l dx mammo. There was no suspicious finding. In 11/2018, she had 2D echo. Her LVEF was 50-55%.  2/8/19: Pt is here for a follow-up visit. She is doing well on herceptin and perjeta. Last echo from Nov 2018 showed EF of 55-65%. No fresh complaints. Last US from Jan 2019 showed: Oval, hypoechoic, circumscribed, parallel mass in the right breast, 4:00  axis, 2 to 3 cm from the nipple measures 0.9 x 0.4 x 1.1 cm. This is not  significantly changed in size from 2/26/2018 where it measured 1.0 x 0.4  x 1.1 cm, and was previously biopsy proven to be benign.   Additional oval, circumscribed, hypoechoic mass in the right breast, 2:00  axis, 7 cm from the nipple measures 1.7 x 1.2 x 0.5 cm. This was likely  present on the prior MRI of 2/15/2018 (series 8, image 49) and is also  likely benign. Continued sonographic follow-up is recommended.   Benign post lumpectomy changes are seen in the left breast, 12:00 axis.  No suspicious solid or cystic masses are seen in the left breast.  4/12/19: The patient is here for f/u visit. She complains RUQ pain for a few weeks. She saw Dr. Manzo and had abdominal sonogram which showed Cholelithiasis without sonographic evidence of acute cholecystitis. CBD dilatation (11 mm), new since March 2016. Recommend correlation with laboratory tests. If indicated, MRCP can be obtained to exclude CBD  obstruction or stone. She has been taking Cipro and Flagyl. She did not have any fever. She is given a referral for MRCP but she has not made an appointment yet. She has b/l dx mammo on 4/219. There was no suspicious finding.  7/26/2019:  The patient is here for follow up. Since last visit , she had a cholecystectomy which showed chronic cholecystitis and cholelithiasis. Her bilirubin and liver enzymes normalized after surgery. She is also switched to venlafaxine for her anxiety. She is feeling much better. She is trying to exercise and regain energy. She complains left axillary pain at the site of SNLBx . She is also concerned about multiple lesions of her breasts that were proven to be benign in the past. She is due for a mammogram in October 2019. The patient also reported palpitations when she gets up quickly from a seating or a sleeping position , and bilateral ankle swelling by the end of the day . She will see her PMD to adjust her medications.   11/11/19 Patient is here today for follow up visit.  She had a mammogram 10/2019 which showed calcification in left breast and a radial sclerosing lesion on biopsy of right breast.  She will have a lumpectomy on right breast for this high risk benign lesion and port removal on same day-12/11/19 by Dr. Hampton.  She offers no further complaints.  5/4/2020: Patient is evaluated via telehealth. She has IDC of the left breast, ER/CT negative and Her-2 positive, with large tumor mass and FDG avid left axillary adenopathy, clinical stage T3N1, s/p neoadjuvant chemotherapy followed by left lumpectomy and SLNBin 8/2018, achieved pCR - followed by  maintenance herceptin and Perjeta. She received adjuvant breast radiotherapy. She had a mammogram 10/2019 which showed calcification in left breast and a radial sclerosing lesion on biopsy of right breast. 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 She recovered well from surgery. She does not have any new complains.  8/3/2020 68 yo female is here today for follow up visit. She was diagnosed with ER/CT neg and her-2 pos IDC of the left breast, s/p neoadjuvant chemotherapy with TCHP x 6 followed by by left lumpectomy and SLNB in 8/2018, achieved pCR, s/p adjuvant WBI and completed maintenance Herceptin and Perjeta, remains KASHMIR.  In 12/2019 she had right lumpectomy and was found to have focal atypical lobular hyperplasia (ALH), and proliferative type fibrocystic changes associated with calcifications from a previous biopsy site. Primary prevention with risk reduction agent was discussed with her previously. She was reluctant.  Today, she c/o worsening pain to her left shoulder area x few months.  No trauma to site reported.   Last mammogram / breast US were done 4/2020 which showed no mammographic or sonographic evidence of malignancy. Post lumpectomy changes of the left breast. Postsurgical distortion of the right breast.  Pt requests for genetic testing to be done today.  Family history includes;  mother- pancreatic ca at 60 yo, maternal aunt- breast cancer at 70's yo, paternal aunt- breast cancer at 70's yo, brother- prostate cancer at 62 yo, cousin- bone cancer at 12 yo.  01/25/2021: MONICA is here for follow up visit for ER/CT neg and her-2 pos IDC of the left breast, s/p neoadjuvant chemotherapy with TCHP x 6 followed by by left lumpectomy and SLNB in 8/2018, achieved pCR, s/p adjuvant WBI and completed maintenance Herceptin and Perjeta, remains KASHMIR.  In 12/2019 she had right lumpectomy and was found to have focal atypical lobular hyperplasia (ALH), and proliferative type fibrocystic changes associated with calcifications from a previous biopsy site. Primary prevention with risk reduction agent was discussed with her previously. She was reluctant and opted to continue on surveillance. Pain of the left shoulder resolved: xray showed No evidence of acute osseous abnormalities. No sclerotic or lytic bone lesion. Mild AC joint and mild glenohumeral joint degenerative changes. Last diagnostic mammogram and US of left breast completed in Oct 2020 showed stable benign post lumpectomy and postradiation changes in the left breast and no significant change to suggest malignancy. She denies any new breast symptoms at this time. Labs reviewed from [facility].  7/29/21: MONICA is here for follow up visit for ER/CT neg and her-2 pos IDC of the left breast, s/p neoadjuvant chemotherapy with TCHP x 6 followed by by left lumpectomy and SLNB in 8/2018, achieved pCR, s/p adjuvant WBI and completed maintenance Herceptin and Perjeta, remains KASHMIR.  In 12/2019 she had right lumpectomy and was found to have focal atypical lobular hyperplasia (ALH), and proliferative type fibrocystic changes associated with calcifications from a previous biopsy site. She opted not to take risk reduction agent. B/L Breast Mammogram  from 4/22/21 showed no mammographic evidence of malignancy. Today patient reports feeling well. She complains pain in her left breast sometimes.    01/26/2022: MONICA is here for follow up visit for ER/CT neg and her-2 pos IDC of the left breast, s/p neoadjuvant chemotherapy with TCHP x 6 followed by by left lumpectomy and SLNB in 8/2018, achieved pCR, s/p adjuvant WBI and completed maintenance Herceptin and Perjeta, remains KASHMIR.  In 12/2019 she had right lumpectomy and was found to have focal atypical lobular hyperplasia (ALH), and proliferative type fibrocystic changes associated with calcifications from a previous biopsy site. She opted not to take risk reduction agent. B/L Breast Mammogram  from 4/22/21 showed no mammographic evidence of malignancy; she will repeat imaging in April. she denies any new breast changes. She continues to have intermittent nerve pain to the surgical bed.   8/15/22: MONICA is here for follow up visit for ER/CT neg and her-2 pos IDC of the left breast diagnosed in 2018, s/p neoadjuvant chemotherapy with TCHP x 6 followed by by left lumpectomy and SLNB in 8/2018, achieved pCR, s/p adjuvant WBI and completed maintenance Herceptin and Perjeta, remains KASHMIR. In 12/2019 she had right lumpectomy and was found to have focal atypical lobular hyperplasia (ALH), and proliferative type fibrocystic changes associated with calcifications from a previous biopsy site. She opted not to take risk reduction agent. On 4/27/22, B/L Breast Mammogram showed an area of architectural distortion at the site of lumpectomy seen in the left breast and an area of benign architectural distortion corresponding to the site of surgery seen in the right breast. b/l breast US on 5/2/22 showed -Right breast at the 11:00 location periareolar region, there appears to be a single duct with debris measuring 3.0 cm x 1.0 cm x 1.9 cm and 6:00 location 2 cm from the nipple of the left breast measuring 3.5 cm x 0.5 cm x 1.7 cm.  An ultrasound-guided core biopsy of each area is recommended. On 5/4/22, she had US guided biopsies. The left breast mass biopsy showed benign atrophic breast tissue. Left breast mass biopsy showed Intraductal papilloma and benign atrophic breast tissue with proliferative type fibrocystic. On 7/25/22, she had right retroareolar mass, radiofrequency seed localized lumpectomy which revealed Intraductal papilloma containing florid duct hyperplasia located and separate radial sclerosing lesion (radial scar). She recovered well from surgery. She complains right hip pain for a few weeks.  2/13/22: MONICA is here for follow up visit for ER/CT neg and her-2 pos IDC of the left breast diagnosed in 2018, s/p neoadjuvant chemotherapy with TCHP x 6 followed by by left lumpectomy and SLNB in 8/2018, achieved pCR, s/p adjuvant WBI and completed maintenance Herceptin and Perjeta, remains KASHMIR. In 12/2019 she had right lumpectomy and was found to have focal atypical lobular hyperplasia (ALH), and proliferative type fibrocystic changes associated with calcifications from a previous biopsy site. She opted not to take risk reduction agent. On 4/27/22, B/L Breast Mammogram showed an area of architectural distortion at the site of lumpectomy seen in the left breast and an area of benign architectural distortion corresponding to the site of surgery seen in the right breast. b/l breast US on 5/2/22 showed -Right breast at the 11:00 location periareolar region, there appears to be a single duct with debris measuring 3.0 cm x 1.0 cm x 1.9 cm and 6:00 location 2 cm from the nipple of the left breast measuring 3.5 cm x 0.5 cm x 1.7 cm.  An ultrasound-guided core biopsy of each area is recommended. On 5/4/22, she had US guided biopsies. The left breast mass biopsy showed benign atrophic breast tissue. Left breast mass biopsy showed Intraductal papilloma and benign atrophic breast tissue with proliferative type fibrocystic. On 7/25/22, she had right retroareolar mass, radiofrequency seed localized lumpectomy which revealed Intraductal papilloma containing florid duct hyperplasia located and separate radial sclerosing lesion (radial scar). She recovered well from surgery. No complaint to offer at this time.  8/14/23 MONICA is here for follow up visit for ER/CT neg and her-2 pos IDC of the left breast diagnosed in 2018, s/p neoadjuvant chemotherapy with TCHP x 6 followed by by left lumpectomy and SLNB in 8/2018, achieved pCR, s/p adjuvant WBI and completed maintenance Herceptin and Perjeta, remains KASHMIR. In 12/2019 she had right lumpectomy and was found to have focal atypical lobular hyperplasia (ALH), and proliferative type fibrocystic changes associated with calcifications from a previous biopsy site. She opted not to take risk reduction agent. On 4/27/22, B/L Breast Mammogram showed an area of architectural distortion at the site of lumpectomy seen in the left breast and an area of benign architectural distortion corresponding to the site of surgery seen in the right breast. b/l breast US on 5/2/22 showed -Right breast at the 11:00 location periareolar region, there appears to be a single duct with debris measuring 3.0 cm x 1.0 cm x 1.9 cm and 6:00 location 2 cm from the nipple of the left breast measuring 3.5 cm x 0.5 cm x 1.7 cm.  An ultrasound-guided core biopsy of each area is recommended. On 5/4/22, she had US guided biopsies. The left breast mass biopsy showed benign atrophic breast tissue. Left breast mass biopsy showed Intraductal papilloma and benign atrophic breast tissue with proliferative type fibrocystic. On 7/25/22, she had right retroareolar mass, radiofrequency seed localized lumpectomy which revealed Intraductal papilloma containing florid duct hyperplasia located and separate radial sclerosing lesion (radial scar). In April, she had right axilla pain and US was performed was negative. No complaint to offer at this time.   2/28/24 MONICA is here for follow up visit for ER/CT neg and her-2 pos IDC of the left breast diagnosed in 2018, s/p neoadjuvant chemotherapy with TCHP x 6 followed by by left lumpectomy and SLNB in 8/2018, achieved pCR, s/p adjuvant WBI and completed maintenance Herceptin and Perjeta, remains KASHMIR. In 12/2019 she had right lumpectomy and was found to have focal atypical lobular hyperplasia (ALH), and proliferative type fibrocystic changes associated with calcifications from a previous biopsy site. She opted not to take risk reduction agent. On 4/27/22, B/L Breast Mammogram showed an area of architectural distortion at the site of lumpectomy seen in the left breast and an area of benign architectural distortion corresponding to the site of surgery seen in the right breast. b/l breast US on 5/2/22 showed -Right breast at the 11:00 location periareolar region, there appears to be a single duct with debris measuring 3.0 cm x 1.0 cm x 1.9 cm and 6:00 location 2 cm from the nipple of the left breast measuring 3.5 cm x 0.5 cm x 1.7 cm.  An ultrasound-guided core biopsy of each area is recommended. On 5/4/22, she had US guided biopsies. The left breast mass biopsy showed benign atrophic breast tissue. Left breast mass biopsy showed Intraductal papilloma and benign atrophic breast tissue with proliferative type fibrocystic. On 7/25/22, she had right retroareolar mass, radiofrequency seed localized lumpectomy which revealed Intraductal papilloma containing florid duct hyperplasia located and separate radial sclerosing lesion (radial scar). In April, she had right axilla pain and US was performed was negative. She had bilateral screening mammogram 12/2023 which showed no evidence of malignancy. No complaint to offer at this time.

## 2024-02-28 NOTE — ASSESSMENT
[FreeTextEntry1] : 1. Poorly differentiated IDC of the left breast, ER/DE negative and Her-2 positive, with large tumor mass and FDG avid left axillary adenopathy, clinical stage T3N1, s/p neoadjuvant chemotherapy followed by left lumpectomy and SLNB, achieved pCR, s/p adjuvant WBI. 2. Right breast ALH, sclerosing papilloma and proliferative type fibrocystic changes, s/p lumpectomy. 3. Mild hypercalcemia, resolved now  Assessment and Plan: -- Breast exam today did not reveal palpable abnormality. She had b/l screening mammo and US in 12/2023 which showed no evidence of malignancy.  -- Order blood work for CBC, CMP and tumor markers. -- RTO for followup visit in 6 months with Dr Lynne.

## 2024-02-29 DIAGNOSIS — C50.912 MALIGNANT NEOPLASM OF UNSPECIFIED SITE OF LEFT FEMALE BREAST: ICD-10-CM

## 2024-02-29 LAB
ALBUMIN SERPL ELPH-MCNC: 4.1 G/DL
ALP BLD-CCNC: 55 U/L
ALT SERPL-CCNC: 25 U/L
ANION GAP SERPL CALC-SCNC: 14 MMOL/L
AST SERPL-CCNC: 25 U/L
BILIRUB DIRECT SERPL-MCNC: <0.2 MG/DL
BILIRUB INDIRECT SERPL-MCNC: >0 MG/DL
BILIRUB SERPL-MCNC: 0.2 MG/DL
BUN SERPL-MCNC: 19 MG/DL
CALCIUM SERPL-MCNC: 9.8 MG/DL
CANCER AG15-3 SERPL-ACNC: 24.1 U/ML
CEA SERPL-MCNC: 1.3 NG/ML
CHLORIDE SERPL-SCNC: 105 MMOL/L
CO2 SERPL-SCNC: 23 MMOL/L
CREAT SERPL-MCNC: 0.6 MG/DL
EGFR: 94 ML/MIN/1.73M2
GLUCOSE SERPL-MCNC: 116 MG/DL
POTASSIUM SERPL-SCNC: 4 MMOL/L
PROT SERPL-MCNC: 6.9 G/DL
SODIUM SERPL-SCNC: 142 MMOL/L
T4 FREE SERPL-MCNC: 1.2 NG/DL
TSH SERPL-ACNC: 2.23 UIU/ML

## 2024-05-11 ENCOUNTER — NON-APPOINTMENT (OUTPATIENT)
Age: 74
End: 2024-05-11

## 2024-06-05 NOTE — ASU PREOP CHECKLIST - HAIR REMOVAL
Your assingments:  Put on clean clothes every day (unless OK'ed by Sadie)    Start wearing your hearing aides every day.    Shower on the afternoons of:  Tuesday and Friday    Brush your teeth twice a day with toothpaste (don't disagree with the health aides).    Exercise on the treadmill per your workout calendar.    Continue to accept help from the people that love you. Listen to your sons.    Plan to stop driving at age 89.    It was great to see you,    Dr. Galaviz   hair removal not indicated

## 2024-08-29 ENCOUNTER — APPOINTMENT (OUTPATIENT)
Age: 74
End: 2024-08-29

## 2024-08-29 ENCOUNTER — LABORATORY RESULT (OUTPATIENT)
Age: 74
End: 2024-08-29

## 2024-08-29 ENCOUNTER — OUTPATIENT (OUTPATIENT)
Dept: OUTPATIENT SERVICES | Facility: HOSPITAL | Age: 74
LOS: 1 days | End: 2024-08-29
Payer: MEDICARE

## 2024-08-29 VITALS
DIASTOLIC BLOOD PRESSURE: 80 MMHG | OXYGEN SATURATION: 98 % | HEIGHT: 67 IN | RESPIRATION RATE: 16 BRPM | WEIGHT: 165 LBS | SYSTOLIC BLOOD PRESSURE: 127 MMHG | BODY MASS INDEX: 25.9 KG/M2 | TEMPERATURE: 98.3 F | HEART RATE: 80 BPM

## 2024-08-29 DIAGNOSIS — Z90.89 ACQUIRED ABSENCE OF OTHER ORGANS: Chronic | ICD-10-CM

## 2024-08-29 DIAGNOSIS — Z98.890 OTHER SPECIFIED POSTPROCEDURAL STATES: Chronic | ICD-10-CM

## 2024-08-29 DIAGNOSIS — Z90.49 ACQUIRED ABSENCE OF OTHER SPECIFIED PARTS OF DIGESTIVE TRACT: Chronic | ICD-10-CM

## 2024-08-29 DIAGNOSIS — Z87.2 PERSONAL HISTORY OF DISEASES OF THE SKIN AND SUBCUTANEOUS TISSUE: Chronic | ICD-10-CM

## 2024-08-29 DIAGNOSIS — D24.2 BENIGN NEOPLASM OF LEFT BREAST: ICD-10-CM

## 2024-08-29 DIAGNOSIS — R92.1 MAMMOGRAPHIC CALCIFICATION FOUND ON DIAGNOSTIC IMAGING OF BREAST: Chronic | ICD-10-CM

## 2024-08-29 DIAGNOSIS — R93.3 ABNORMAL FINDINGS ON DIAGNOSTIC IMAGING OF OTHER PARTS OF DIGESTIVE TRACT: Chronic | ICD-10-CM

## 2024-08-29 LAB
ALBUMIN SERPL ELPH-MCNC: 4.4 G/DL
ALP BLD-CCNC: 51 U/L
ALT SERPL-CCNC: 22 U/L
ANION GAP SERPL CALC-SCNC: 11 MMOL/L
AST SERPL-CCNC: 22 U/L
BILIRUB DIRECT SERPL-MCNC: <0.2 MG/DL
BILIRUB INDIRECT SERPL-MCNC: >0.1 MG/DL
BILIRUB SERPL-MCNC: 0.3 MG/DL
BUN SERPL-MCNC: 23 MG/DL
CALCIUM SERPL-MCNC: 9.9 MG/DL
CHLORIDE SERPL-SCNC: 106 MMOL/L
CO2 SERPL-SCNC: 25 MMOL/L
CREAT SERPL-MCNC: 0.7 MG/DL
EGFR: 91 ML/MIN/1.73M2
GLUCOSE SERPL-MCNC: 100 MG/DL
HCT VFR BLD CALC: 41.7 %
HGB BLD-MCNC: 14 G/DL
MCHC RBC-ENTMCNC: 31 PG
MCHC RBC-ENTMCNC: 33.6 G/DL
MCV RBC AUTO: 92.3 FL
PLATELET # BLD AUTO: 213 K/UL
PMV BLD: 9.8 FL
POTASSIUM SERPL-SCNC: 4.9 MMOL/L
PROT SERPL-MCNC: 7 G/DL
RBC # BLD: 4.52 M/UL
RBC # FLD: 13.1 %
SODIUM SERPL-SCNC: 142 MMOL/L
WBC # FLD AUTO: 5.81 K/UL

## 2024-08-29 PROCEDURE — 99213 OFFICE O/P EST LOW 20 MIN: CPT

## 2024-08-29 PROCEDURE — 80048 BASIC METABOLIC PNL TOTAL CA: CPT

## 2024-08-29 PROCEDURE — 86300 IMMUNOASSAY TUMOR CA 15-3: CPT

## 2024-08-29 PROCEDURE — 82378 CARCINOEMBRYONIC ANTIGEN: CPT

## 2024-08-29 PROCEDURE — 85027 COMPLETE CBC AUTOMATED: CPT

## 2024-08-29 PROCEDURE — 80076 HEPATIC FUNCTION PANEL: CPT

## 2024-08-30 DIAGNOSIS — D24.2 BENIGN NEOPLASM OF LEFT BREAST: ICD-10-CM

## 2024-08-30 LAB
CANCER AG15-3 SERPL-ACNC: 23.7 U/ML
CEA SERPL-MCNC: 1.2 NG/ML

## 2024-10-05 ENCOUNTER — NON-APPOINTMENT (OUTPATIENT)
Age: 74
End: 2024-10-05

## 2024-10-11 ENCOUNTER — OUTPATIENT (OUTPATIENT)
Dept: OUTPATIENT SERVICES | Facility: HOSPITAL | Age: 74
LOS: 1 days | Discharge: HOME | End: 2024-10-11
Payer: MEDICARE

## 2024-10-11 VITALS — HEART RATE: 68 BPM | DIASTOLIC BLOOD PRESSURE: 78 MMHG | SYSTOLIC BLOOD PRESSURE: 139 MMHG

## 2024-10-11 VITALS
WEIGHT: 162.92 LBS | HEART RATE: 71 BPM | RESPIRATION RATE: 17 BRPM | DIASTOLIC BLOOD PRESSURE: 88 MMHG | OXYGEN SATURATION: 96 % | TEMPERATURE: 97 F | SYSTOLIC BLOOD PRESSURE: 154 MMHG | HEIGHT: 67 IN

## 2024-10-11 DIAGNOSIS — R93.3 ABNORMAL FINDINGS ON DIAGNOSTIC IMAGING OF OTHER PARTS OF DIGESTIVE TRACT: Chronic | ICD-10-CM

## 2024-10-11 DIAGNOSIS — R92.1 MAMMOGRAPHIC CALCIFICATION FOUND ON DIAGNOSTIC IMAGING OF BREAST: Chronic | ICD-10-CM

## 2024-10-11 DIAGNOSIS — Z90.49 ACQUIRED ABSENCE OF OTHER SPECIFIED PARTS OF DIGESTIVE TRACT: Chronic | ICD-10-CM

## 2024-10-11 DIAGNOSIS — Z98.890 OTHER SPECIFIED POSTPROCEDURAL STATES: Chronic | ICD-10-CM

## 2024-10-11 DIAGNOSIS — Z87.2 PERSONAL HISTORY OF DISEASES OF THE SKIN AND SUBCUTANEOUS TISSUE: Chronic | ICD-10-CM

## 2024-10-11 DIAGNOSIS — H25.11 AGE-RELATED NUCLEAR CATARACT, RIGHT EYE: ICD-10-CM

## 2024-10-11 DIAGNOSIS — Z90.89 ACQUIRED ABSENCE OF OTHER ORGANS: Chronic | ICD-10-CM

## 2024-10-11 PROCEDURE — V2632: CPT

## 2024-10-11 NOTE — CHART NOTE - NSCHARTNOTEFT_GEN_A_CORE
PACU ANESTHESIA ADMISSION NOTE      Procedure:   Post op diagnosis:      ____  Intubated  TV:______       Rate: ______      FiO2: ______    _x___  Patent Airway    _x___  Full return of protective reflexes    _x___  Full recovery from anesthesia / back to baseline status    Vitals:  T(C): 36.2 (10-11-24 @ 09:36), Max: 36.2 (10-11-24 @ 09:36)    BP  121/56  P  67  R  15  Sat  98      Mental Status:  _x___ Awake   _____ Alert   _____ Drowsy   _____ Sedated    Nausea/Vomiting:  _x___  NO       ______Yes,   See Post - Op Orders         Pain Scale (0-10):  __0___    Treatment: _x___ None    ____ See Post - Op/PCA Orders    Post - Operative Fluids:   __x__ Oral   ____ See Post - Op Orders    Plan: Discharge:   _x___Home       _____Floor     _____Critical Care    _____  Other:_________________    Comments:  No anesthesia issues or complications noted.  Discharge when criteria met.

## 2024-10-11 NOTE — ASU PATIENT PROFILE, ADULT - FALL HARM RISK - HARM RISK INTERVENTIONS

## 2024-10-15 DIAGNOSIS — Z92.21 PERSONAL HISTORY OF ANTINEOPLASTIC CHEMOTHERAPY: ICD-10-CM

## 2024-10-15 DIAGNOSIS — E04.1 NONTOXIC SINGLE THYROID NODULE: ICD-10-CM

## 2024-10-15 DIAGNOSIS — K21.9 GASTRO-ESOPHAGEAL REFLUX DISEASE WITHOUT ESOPHAGITIS: ICD-10-CM

## 2024-10-15 DIAGNOSIS — H26.8 OTHER SPECIFIED CATARACT: ICD-10-CM

## 2024-10-15 DIAGNOSIS — Z85.3 PERSONAL HISTORY OF MALIGNANT NEOPLASM OF BREAST: ICD-10-CM

## 2024-10-18 ENCOUNTER — OUTPATIENT (OUTPATIENT)
Dept: OUTPATIENT SERVICES | Facility: HOSPITAL | Age: 74
LOS: 1 days | Discharge: HOME | End: 2024-10-18
Payer: MEDICARE

## 2024-10-18 VITALS — DIASTOLIC BLOOD PRESSURE: 70 MMHG | SYSTOLIC BLOOD PRESSURE: 140 MMHG | HEART RATE: 70 BPM | RESPIRATION RATE: 18 BRPM

## 2024-10-18 VITALS
DIASTOLIC BLOOD PRESSURE: 89 MMHG | HEART RATE: 70 BPM | SYSTOLIC BLOOD PRESSURE: 144 MMHG | HEIGHT: 65 IN | TEMPERATURE: 98 F | RESPIRATION RATE: 18 BRPM | WEIGHT: 132.06 LBS | OXYGEN SATURATION: 97 %

## 2024-10-18 DIAGNOSIS — Z90.89 ACQUIRED ABSENCE OF OTHER ORGANS: Chronic | ICD-10-CM

## 2024-10-18 DIAGNOSIS — R92.1 MAMMOGRAPHIC CALCIFICATION FOUND ON DIAGNOSTIC IMAGING OF BREAST: Chronic | ICD-10-CM

## 2024-10-18 DIAGNOSIS — Z98.890 OTHER SPECIFIED POSTPROCEDURAL STATES: Chronic | ICD-10-CM

## 2024-10-18 DIAGNOSIS — Z98.49 CATARACT EXTRACTION STATUS, UNSPECIFIED EYE: Chronic | ICD-10-CM

## 2024-10-18 DIAGNOSIS — Z90.49 ACQUIRED ABSENCE OF OTHER SPECIFIED PARTS OF DIGESTIVE TRACT: Chronic | ICD-10-CM

## 2024-10-18 DIAGNOSIS — Z87.2 PERSONAL HISTORY OF DISEASES OF THE SKIN AND SUBCUTANEOUS TISSUE: Chronic | ICD-10-CM

## 2024-10-18 DIAGNOSIS — R93.3 ABNORMAL FINDINGS ON DIAGNOSTIC IMAGING OF OTHER PARTS OF DIGESTIVE TRACT: Chronic | ICD-10-CM

## 2024-10-18 PROCEDURE — V2632: CPT

## 2024-10-18 RX ORDER — METOPROLOL TARTRATE 50 MG
1 TABLET ORAL
Refills: 0 | DISCHARGE

## 2024-10-18 NOTE — ASU PATIENT PROFILE, ADULT - NSICDXPASTMEDICALHX_GEN_ALL_CORE_FT
PAST MEDICAL HISTORY:  Anxiety     Barretts esophagus     Bilateral cataracts     Breast cancer left arm prec    Former smoker     GERD (gastroesophageal reflux disease)     H/O therapeutic radiation     Hiatal hernia     History of cancer chemotherapy     HTN (hypertension)     Hypothyroid med dose stable ~ 1 yr    MVP (mitral valve prolapse)     Thyroid nodule

## 2024-10-18 NOTE — ASU PATIENT PROFILE, ADULT - NSICDXPASTSURGICALHX_GEN_ALL_CORE_FT
PAST SURGICAL HISTORY:  Abnormal magnetic resonance cholangiopancreatography (MRCP) STENT PLACED IN BILE DUCT    Breast calcifications     H/O cataract extraction right    H/O lymph node biopsy sentinal node left breast    H/O sebaceous cyst EXCISION LEFT BREAST    History of lumpectomy of right breast 2019    S/P laparoscopic cholecystectomy 5/18/19 WITH REMOVAL OF CBD STENT    S/P lumpectomy, left breast     S/P tonsillectomy

## 2024-10-18 NOTE — ASU PATIENT PROFILE, ADULT - FALL HARM RISK - HARM RISK INTERVENTIONS

## 2024-10-22 DIAGNOSIS — Z85.3 PERSONAL HISTORY OF MALIGNANT NEOPLASM OF BREAST: ICD-10-CM

## 2024-10-22 DIAGNOSIS — Z92.3 PERSONAL HISTORY OF IRRADIATION: ICD-10-CM

## 2024-10-22 DIAGNOSIS — K21.9 GASTRO-ESOPHAGEAL REFLUX DISEASE WITHOUT ESOPHAGITIS: ICD-10-CM

## 2024-10-22 DIAGNOSIS — Z92.21 PERSONAL HISTORY OF ANTINEOPLASTIC CHEMOTHERAPY: ICD-10-CM

## 2024-10-22 DIAGNOSIS — I10 ESSENTIAL (PRIMARY) HYPERTENSION: ICD-10-CM

## 2024-10-22 DIAGNOSIS — H26.8 OTHER SPECIFIED CATARACT: ICD-10-CM

## 2024-10-22 DIAGNOSIS — E03.9 HYPOTHYROIDISM, UNSPECIFIED: ICD-10-CM

## 2024-10-22 DIAGNOSIS — Z87.891 PERSONAL HISTORY OF NICOTINE DEPENDENCE: ICD-10-CM

## 2024-10-22 DIAGNOSIS — F41.9 ANXIETY DISORDER, UNSPECIFIED: ICD-10-CM

## 2025-01-02 ENCOUNTER — RESULT REVIEW (OUTPATIENT)
Age: 75
End: 2025-01-02

## 2025-01-02 ENCOUNTER — OUTPATIENT (OUTPATIENT)
Dept: OUTPATIENT SERVICES | Facility: HOSPITAL | Age: 75
LOS: 1 days | End: 2025-01-02
Payer: MEDICARE

## 2025-01-02 DIAGNOSIS — Z90.89 ACQUIRED ABSENCE OF OTHER ORGANS: Chronic | ICD-10-CM

## 2025-01-02 DIAGNOSIS — R93.3 ABNORMAL FINDINGS ON DIAGNOSTIC IMAGING OF OTHER PARTS OF DIGESTIVE TRACT: Chronic | ICD-10-CM

## 2025-01-02 DIAGNOSIS — Z87.2 PERSONAL HISTORY OF DISEASES OF THE SKIN AND SUBCUTANEOUS TISSUE: Chronic | ICD-10-CM

## 2025-01-02 DIAGNOSIS — Z98.890 OTHER SPECIFIED POSTPROCEDURAL STATES: Chronic | ICD-10-CM

## 2025-01-02 DIAGNOSIS — R92.1 MAMMOGRAPHIC CALCIFICATION FOUND ON DIAGNOSTIC IMAGING OF BREAST: Chronic | ICD-10-CM

## 2025-01-02 DIAGNOSIS — Z98.49 CATARACT EXTRACTION STATUS, UNSPECIFIED EYE: Chronic | ICD-10-CM

## 2025-01-02 DIAGNOSIS — Z12.31 ENCOUNTER FOR SCREENING MAMMOGRAM FOR MALIGNANT NEOPLASM OF BREAST: ICD-10-CM

## 2025-01-02 DIAGNOSIS — Z90.49 ACQUIRED ABSENCE OF OTHER SPECIFIED PARTS OF DIGESTIVE TRACT: Chronic | ICD-10-CM

## 2025-01-02 PROCEDURE — 77067 SCR MAMMO BI INCL CAD: CPT | Mod: 26

## 2025-01-02 PROCEDURE — 77063 BREAST TOMOSYNTHESIS BI: CPT | Mod: 26

## 2025-01-02 PROCEDURE — 77067 SCR MAMMO BI INCL CAD: CPT

## 2025-01-02 PROCEDURE — 77063 BREAST TOMOSYNTHESIS BI: CPT

## 2025-01-03 DIAGNOSIS — Z12.31 ENCOUNTER FOR SCREENING MAMMOGRAM FOR MALIGNANT NEOPLASM OF BREAST: ICD-10-CM

## 2025-01-07 ENCOUNTER — APPOINTMENT (OUTPATIENT)
Dept: BREAST CENTER | Facility: CLINIC | Age: 75
End: 2025-01-07
Payer: MEDICARE

## 2025-01-07 VITALS
HEIGHT: 67 IN | BODY MASS INDEX: 25.9 KG/M2 | SYSTOLIC BLOOD PRESSURE: 116 MMHG | DIASTOLIC BLOOD PRESSURE: 63 MMHG | WEIGHT: 165 LBS

## 2025-01-07 DIAGNOSIS — N60.91 UNSPECIFIED BENIGN MAMMARY DYSPLASIA OF RIGHT BREAST: ICD-10-CM

## 2025-01-07 DIAGNOSIS — Z17.1 MALIGNANT NEOPLASM OF UPPER-OUTER QUADRANT OF LEFT FEMALE BREAST: ICD-10-CM

## 2025-01-07 DIAGNOSIS — C50.412 MALIGNANT NEOPLASM OF UPPER-OUTER QUADRANT OF LEFT FEMALE BREAST: ICD-10-CM

## 2025-01-07 DIAGNOSIS — C50.912 MALIGNANT NEOPLASM OF UNSPECIFIED SITE OF LEFT FEMALE BREAST: ICD-10-CM

## 2025-01-07 DIAGNOSIS — D24.2 BENIGN NEOPLASM OF LEFT BREAST: ICD-10-CM

## 2025-01-07 PROBLEM — H26.9 UNSPECIFIED CATARACT: Chronic | Status: ACTIVE | Noted: 2024-10-18

## 2025-01-07 PROCEDURE — 99213 OFFICE O/P EST LOW 20 MIN: CPT

## 2025-03-29 NOTE — ASU PREOP CHECKLIST - TEMPERATURE IN CELSIUS (DEGREES C)
36.5
The Delivery OB Provider certifies that vaginal examination and/or abdominal examination after the delivery was done and no foreign body was found.

## 2025-08-05 ENCOUNTER — APPOINTMENT (OUTPATIENT)
Age: 75
End: 2025-08-05
Payer: MEDICARE

## 2025-08-05 VITALS
DIASTOLIC BLOOD PRESSURE: 76 MMHG | HEART RATE: 89 BPM | SYSTOLIC BLOOD PRESSURE: 126 MMHG | WEIGHT: 167 LBS | OXYGEN SATURATION: 96 % | TEMPERATURE: 98.7 F | RESPIRATION RATE: 16 BRPM | HEIGHT: 67 IN | BODY MASS INDEX: 26.21 KG/M2

## 2025-08-05 DIAGNOSIS — Z17.1 MALIGNANT NEOPLASM OF UPPER-OUTER QUADRANT OF LEFT FEMALE BREAST: ICD-10-CM

## 2025-08-05 DIAGNOSIS — N60.91 UNSPECIFIED BENIGN MAMMARY DYSPLASIA OF RIGHT BREAST: ICD-10-CM

## 2025-08-05 DIAGNOSIS — C50.412 MALIGNANT NEOPLASM OF UPPER-OUTER QUADRANT OF LEFT FEMALE BREAST: ICD-10-CM

## 2025-08-05 PROCEDURE — 99213 OFFICE O/P EST LOW 20 MIN: CPT

## 2025-08-05 PROCEDURE — G2211 COMPLEX E/M VISIT ADD ON: CPT

## 2025-08-22 ENCOUNTER — NON-APPOINTMENT (OUTPATIENT)
Age: 75
End: 2025-08-22

## 2025-08-29 ENCOUNTER — APPOINTMENT (OUTPATIENT)
Dept: ORTHOPEDIC SURGERY | Facility: CLINIC | Age: 75
End: 2025-08-29